# Patient Record
Sex: MALE | Race: WHITE | Employment: FULL TIME | URBAN - METROPOLITAN AREA
[De-identification: names, ages, dates, MRNs, and addresses within clinical notes are randomized per-mention and may not be internally consistent; named-entity substitution may affect disease eponyms.]

---

## 2018-01-24 ENCOUNTER — APPOINTMENT (OUTPATIENT)
Dept: GENERAL RADIOLOGY | Age: 48
End: 2018-01-24
Attending: EMERGENCY MEDICINE
Payer: COMMERCIAL

## 2018-01-24 ENCOUNTER — HOSPITAL ENCOUNTER (EMERGENCY)
Age: 48
Discharge: HOME | End: 2018-01-24
Attending: EMERGENCY MEDICINE
Payer: COMMERCIAL

## 2018-01-24 VITALS
DIASTOLIC BLOOD PRESSURE: 88 MMHG | RESPIRATION RATE: 11 BRPM | SYSTOLIC BLOOD PRESSURE: 146 MMHG | BODY MASS INDEX: 34.54 KG/M2 | HEIGHT: 72 IN | HEART RATE: 66 BPM | WEIGHT: 255 LBS | TEMPERATURE: 97.6 F | OXYGEN SATURATION: 99 %

## 2018-01-24 DIAGNOSIS — I10 ESSENTIAL HYPERTENSION: ICD-10-CM

## 2018-01-24 DIAGNOSIS — R07.89 CHEST WALL PAIN: Primary | ICD-10-CM

## 2018-01-24 LAB
ALBUMIN SERPL-MCNC: 4.3 G/DL (ref 3.4–5)
ALBUMIN/GLOB SERPL: 1.2 {RATIO} (ref 0.8–1.7)
ALP SERPL-CCNC: 66 U/L (ref 45–117)
ALT SERPL-CCNC: 37 U/L (ref 16–61)
ANION GAP SERPL CALC-SCNC: 9 MMOL/L (ref 3–18)
AST SERPL-CCNC: 24 U/L (ref 15–37)
ATRIAL RATE: 86 BPM
BASOPHILS # BLD: 0 K/UL (ref 0–0.06)
BASOPHILS NFR BLD: 0 % (ref 0–2)
BILIRUB SERPL-MCNC: 1 MG/DL (ref 0.2–1)
BUN SERPL-MCNC: 11 MG/DL (ref 7–18)
BUN/CREAT SERPL: 10 (ref 12–20)
CALCIUM SERPL-MCNC: 9.4 MG/DL (ref 8.5–10.1)
CALCULATED P AXIS, ECG09: 54 DEGREES
CALCULATED R AXIS, ECG10: 58 DEGREES
CALCULATED T AXIS, ECG11: 54 DEGREES
CHLORIDE SERPL-SCNC: 97 MMOL/L (ref 100–108)
CO2 SERPL-SCNC: 30 MMOL/L (ref 21–32)
CREAT SERPL-MCNC: 1.11 MG/DL (ref 0.6–1.3)
DIAGNOSIS, 93000: NORMAL
DIFFERENTIAL METHOD BLD: NORMAL
EOSINOPHIL # BLD: 0.2 K/UL (ref 0–0.4)
EOSINOPHIL NFR BLD: 2 % (ref 0–5)
ERYTHROCYTE [DISTWIDTH] IN BLOOD BY AUTOMATED COUNT: 12.2 % (ref 11.6–14.5)
GLOBULIN SER CALC-MCNC: 3.6 G/DL (ref 2–4)
GLUCOSE SERPL-MCNC: 99 MG/DL (ref 74–99)
HCT VFR BLD AUTO: 46.1 % (ref 36–48)
HGB BLD-MCNC: 15.9 G/DL (ref 13–16)
LYMPHOCYTES # BLD: 2.6 K/UL (ref 0.9–3.6)
LYMPHOCYTES NFR BLD: 27 % (ref 21–52)
MCH RBC QN AUTO: 29.9 PG (ref 24–34)
MCHC RBC AUTO-ENTMCNC: 34.5 G/DL (ref 31–37)
MCV RBC AUTO: 86.7 FL (ref 74–97)
MONOCYTES # BLD: 0.6 K/UL (ref 0.05–1.2)
MONOCYTES NFR BLD: 6 % (ref 3–10)
NEUTS SEG # BLD: 6.3 K/UL (ref 1.8–8)
NEUTS SEG NFR BLD: 65 % (ref 40–73)
P-R INTERVAL, ECG05: 164 MS
PLATELET # BLD AUTO: 344 K/UL (ref 135–420)
PMV BLD AUTO: 10 FL (ref 9.2–11.8)
POTASSIUM SERPL-SCNC: 3.7 MMOL/L (ref 3.5–5.5)
PROT SERPL-MCNC: 7.9 G/DL (ref 6.4–8.2)
Q-T INTERVAL, ECG07: 346 MS
QRS DURATION, ECG06: 92 MS
QTC CALCULATION (BEZET), ECG08: 414 MS
RBC # BLD AUTO: 5.32 M/UL (ref 4.7–5.5)
SODIUM SERPL-SCNC: 136 MMOL/L (ref 136–145)
TROPONIN I SERPL-MCNC: <0.02 NG/ML (ref 0–0.06)
VENTRICULAR RATE, ECG03: 86 BPM
WBC # BLD AUTO: 9.7 K/UL (ref 4.6–13.2)

## 2018-01-24 PROCEDURE — 99284 EMERGENCY DEPT VISIT MOD MDM: CPT

## 2018-01-24 PROCEDURE — 85025 COMPLETE CBC W/AUTO DIFF WBC: CPT | Performed by: EMERGENCY MEDICINE

## 2018-01-24 PROCEDURE — 80053 COMPREHEN METABOLIC PANEL: CPT | Performed by: EMERGENCY MEDICINE

## 2018-01-24 PROCEDURE — 71046 X-RAY EXAM CHEST 2 VIEWS: CPT

## 2018-01-24 PROCEDURE — 84484 ASSAY OF TROPONIN QUANT: CPT | Performed by: EMERGENCY MEDICINE

## 2018-01-24 PROCEDURE — 93005 ELECTROCARDIOGRAM TRACING: CPT

## 2018-01-24 NOTE — ED NOTES
Bedside and Verbal shift change report given to Len Parson RN (oncoming nurse) by Deo Puckett RN (offgoing nurse). Report included the following information SBAR, ED Summary, Procedure Summary, Intake/Output and MAR.

## 2018-01-24 NOTE — ED TRIAGE NOTES
C/o left side chest pain since shoveling snow during first snow fall. 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

## 2018-01-24 NOTE — ED PROVIDER NOTES
EMERGENCY DEPARTMENT HISTORY AND PHYSICAL EXAM    2:41 PM      Date: 1/24/2018  Patient Name: Jody Clay    History of Presenting Illness     Chief Complaint   Patient presents with    Chest Pain         History Provided By: Patient    Chief Complaint: Chest Pain  Duration: 3 Weeks  Timing:  Constant and Progressive  Location: Left upper  Quality:   Severity: 4 out of 10  Modifying Factors: shoveling snow  Associated Symptoms: SOB and nausea. Denies emesis or abdominal pain      Additional History (Context): Jody Clay is a 49 y.o. male with hx of ruptured windpipe who presents with c/o constant and progressive 4/10 left upper chest pain onset 3 weeks after shoveling snow. Pt states associated sx of SOB and nausea. Denies emesis or abdominal pain. Pt states that bending over increases pain but ambulating causes no change in sx. Denies hx of heart disease. Pt states he is a current smoker (x30 years) and daily ETOH user (with last drink yesterday). Pt reports not currently having a PCP. PCP: Sierra Grubbs MD        Past History     Past Medical History:  Past Medical History:   Diagnosis Date    H/O medication noncompliance     refused to take meds for bp per patient    Hypertension     Ill-defined condition     ruptured windpipe       Past Surgical History:  History reviewed. No pertinent surgical history. Family History:  History reviewed. No pertinent family history. Social History:  Social History   Substance Use Topics    Smoking status: Current Every Day Smoker    Smokeless tobacco: Never Used    Alcohol use Yes       Allergies:  No Known Allergies      Review of Systems       Review of Systems   Constitutional: Negative for fever. Eyes: Negative for visual disturbance. Respiratory: Positive for shortness of breath. Cardiovascular: Positive for chest pain. Negative for leg swelling. Gastrointestinal: Positive for nausea.  Negative for abdominal pain, blood in stool and vomiting. Genitourinary: Negative for dysuria and flank pain. Musculoskeletal: Negative for arthralgias and neck pain. Skin: Negative for rash. Neurological: Negative for headaches. Hematological: Does not bruise/bleed easily. Psychiatric/Behavioral: Negative for confusion. All other systems reviewed and are negative. Physical Exam     Visit Vitals    BP (!) 195/101    Pulse 79    Temp 97.6 °F (36.4 °C)    Resp 14    Ht 6' (1.829 m)    Wt 115.7 kg (255 lb)    SpO2 100%    BMI 34.58 kg/m2         Physical Exam   Constitutional: He is oriented to person, place, and time. He appears well-developed and well-nourished. No distress. HENT:   Head: Normocephalic and atraumatic. Right Ear: External ear normal.   Left Ear: External ear normal.   Nose: Nose normal.   Mouth/Throat: Oropharynx is clear and moist.   Eyes: Conjunctivae and EOM are normal. Pupils are equal, round, and reactive to light. No scleral icterus. Neck: Normal range of motion. Neck supple. No JVD present. No tracheal deviation present. No thyromegaly present. Cardiovascular: Normal rate, regular rhythm, normal heart sounds and intact distal pulses. Exam reveals no gallop and no friction rub. No murmur heard. Pulmonary/Chest: Effort normal and breath sounds normal. He exhibits no tenderness. Abdominal: Soft. Bowel sounds are normal. He exhibits no distension. There is no tenderness. There is no rebound and no guarding. Musculoskeletal: Normal range of motion. He exhibits tenderness (Reproducible at left upper anterior chest wall). He exhibits no edema. Lymphadenopathy:     He has no cervical adenopathy. Neurological: He is alert and oriented to person, place, and time. No cranial nerve deficit. Coordination normal.   No sensory loss, Gait normal, Motor 5/5   Skin: Skin is warm and dry. Psychiatric: He has a normal mood and affect.  His behavior is normal. Judgment and thought content normal.   Nursing note and vitals reviewed. Diagnostic Study Results     Labs -  Recent Results (from the past 12 hour(s))   EKG, 12 LEAD, INITIAL    Collection Time: 01/24/18  2:37 PM   Result Value Ref Range    Ventricular Rate 86 BPM    Atrial Rate 86 BPM    P-R Interval 164 ms    QRS Duration 92 ms    Q-T Interval 346 ms    QTC Calculation (Bezet) 414 ms    Calculated P Axis 54 degrees    Calculated R Axis 58 degrees    Calculated T Axis 54 degrees    Diagnosis       Normal sinus rhythm  Possible Left atrial enlargement  Borderline ECG  When compared with ECG of 13-APR-2010 13:39,  No significant change was found  Confirmed by Magalie Cerrato MD, Jimy Krishna ([id number]) on 1/24/2018 3:02:30 PM     CBC WITH AUTOMATED DIFF    Collection Time: 01/24/18  2:45 PM   Result Value Ref Range    WBC 9.7 4.6 - 13.2 K/uL    RBC 5.32 4.70 - 5.50 M/uL    HGB 15.9 13.0 - 16.0 g/dL    HCT 46.1 36.0 - 48.0 %    MCV 86.7 74.0 - 97.0 FL    MCH 29.9 24.0 - 34.0 PG    MCHC 34.5 31.0 - 37.0 g/dL    RDW 12.2 11.6 - 14.5 %    PLATELET 822 499 - 063 K/uL    MPV 10.0 9.2 - 11.8 FL    NEUTROPHILS 65 40 - 73 %    LYMPHOCYTES 27 21 - 52 %    MONOCYTES 6 3 - 10 %    EOSINOPHILS 2 0 - 5 %    BASOPHILS 0 0 - 2 %    ABS. NEUTROPHILS 6.3 1.8 - 8.0 K/UL    ABS. LYMPHOCYTES 2.6 0.9 - 3.6 K/UL    ABS. MONOCYTES 0.6 0.05 - 1.2 K/UL    ABS. EOSINOPHILS 0.2 0.0 - 0.4 K/UL    ABS.  BASOPHILS 0.0 0.0 - 0.06 K/UL    DF AUTOMATED     TROPONIN I    Collection Time: 01/24/18  2:45 PM   Result Value Ref Range    Troponin-I, Qt. <0.02 0.00 - 9.87 NG/ML   METABOLIC PANEL, COMPREHENSIVE    Collection Time: 01/24/18  2:45 PM   Result Value Ref Range    Sodium 136 136 - 145 mmol/L    Potassium 3.7 3.5 - 5.5 mmol/L    Chloride 97 (L) 100 - 108 mmol/L    CO2 30 21 - 32 mmol/L    Anion gap 9 3.0 - 18 mmol/L    Glucose 99 74 - 99 mg/dL    BUN 11 7.0 - 18 MG/DL    Creatinine 1.11 0.6 - 1.3 MG/DL    BUN/Creatinine ratio 10 (L) 12 - 20      GFR est AA >60 >60 ml/min/1.73m2    GFR est non-AA >60 >60 ml/min/1.73m2    Calcium 9.4 8.5 - 10.1 MG/DL    Bilirubin, total 1.0 0.2 - 1.0 MG/DL    ALT (SGPT) 37 16 - 61 U/L    AST (SGOT) 24 15 - 37 U/L    Alk. phosphatase 66 45 - 117 U/L    Protein, total 7.9 6.4 - 8.2 g/dL    Albumin 4.3 3.4 - 5.0 g/dL    Globulin 3.6 2.0 - 4.0 g/dL    A-G Ratio 1.2 0.8 - 1.7         Radiologic Studies -   XR CHEST AP LAT    (Results Pending)   CXR 3:25PM ED Provider Interpretation:  No acute changes      Medical Decision Making   I am the first provider for this patient. I reviewed the vital signs, available nursing notes, past medical history, past surgical history, family history and social history. Vital Signs-Reviewed the patient's vital signs. Pulse Oximetry Analysis -  100% on room air (Interpretation) Normal    Cardiac Monitor:  Rate: 87 bpm  Rhythm:  Normal Sinus Rhythm     EKG: Interpreted by the EP. Time Interpreted: 6599   Rate: 86 bpm   Rhythm: Normal Sinus Rhythm    Interpretation: No acute changes      Records Reviewed: Nursing Notes (Time of Review: 2:39 PM)        Provider Notes (Medical Decision Making): Results reviewed with pt, he will F/U with PCP for BP re-check as discussed. Pt says he's feeling better now and wants to go home. He agrees with dispo and F/U plan. Luciana Quintana MD  3:32 PM          Diagnosis     Clinical Impression: No diagnosis found. Disposition: Discharge    Follow-up Information     None           Patient's Medications    No medications on file     _______________________________    Attestations:  Scribe Attestation     Francis Shira acting as a scribe for and in the presence of Luciana Quintana MD      January 24, 2018 at 2:39 PM       Provider Attestation:      I personally performed the services described in the documentation, reviewed the documentation, as recorded by the scribe in my presence, and it accurately and completely records my words and actions.  January 24, 2018 at 2:39 PM - Luciana Quintana MD _______________________________

## 2018-01-24 NOTE — ED NOTES
Bedside and Verbal shift change report given to Ericka Chandra RN (oncoming nurse) by Alex Hwang RN (offgoing nurse). Report included the following information SBAR, ED Summary and Procedure Summary.

## 2018-01-24 NOTE — DISCHARGE INSTRUCTIONS

## 2021-03-16 ENCOUNTER — HOSPITAL ENCOUNTER (OUTPATIENT)
Dept: GENERAL RADIOLOGY | Age: 51
Discharge: HOME | End: 2021-03-16
Payer: COMMERCIAL

## 2021-03-16 DIAGNOSIS — R06.02 SHORTNESS OF BREATH: ICD-10-CM

## 2021-03-16 PROCEDURE — 71046 X-RAY EXAM CHEST 2 VIEWS: CPT

## 2021-04-13 ENCOUNTER — TRANSCRIBE ORDER (OUTPATIENT)
Dept: SCHEDULING | Age: 51
End: 2021-04-13

## 2021-04-13 DIAGNOSIS — G56.01 CARPAL TUNNEL SYNDROME OF RIGHT WRIST: ICD-10-CM

## 2021-04-13 DIAGNOSIS — S23.9XXD SPRAIN OF UNSPECIFIED PARTS OF THORAX, SUBSEQUENT ENCOUNTER: ICD-10-CM

## 2021-04-13 DIAGNOSIS — M54.12 CERVICAL RADICULOPATHY: Primary | ICD-10-CM

## 2021-04-13 DIAGNOSIS — S33.5XXD SPRAIN OF LIGAMENTS OF LUMBAR SPINE, SUBSEQUENT ENCOUNTER: ICD-10-CM

## 2021-04-13 DIAGNOSIS — S43.401D UNSPECIFIED SPRAIN OF RIGHT SHOULDER JOINT, SUBSEQUENT ENCOUNTER: ICD-10-CM

## 2021-05-03 ENCOUNTER — HOSPITAL ENCOUNTER (OUTPATIENT)
Age: 51
Discharge: HOME | End: 2021-05-03
Attending: PHYSICIAN ASSISTANT
Payer: COMMERCIAL

## 2021-05-03 DIAGNOSIS — M54.12 CERVICAL RADICULOPATHY: ICD-10-CM

## 2021-05-03 DIAGNOSIS — S33.5XXD SPRAIN OF LIGAMENTS OF LUMBAR SPINE, SUBSEQUENT ENCOUNTER: ICD-10-CM

## 2021-05-03 DIAGNOSIS — S43.401D UNSPECIFIED SPRAIN OF RIGHT SHOULDER JOINT, SUBSEQUENT ENCOUNTER: ICD-10-CM

## 2021-05-03 DIAGNOSIS — G56.01 CARPAL TUNNEL SYNDROME OF RIGHT WRIST: ICD-10-CM

## 2021-05-03 DIAGNOSIS — S23.9XXD SPRAIN OF UNSPECIFIED PARTS OF THORAX, SUBSEQUENT ENCOUNTER: ICD-10-CM

## 2021-05-03 PROCEDURE — 72141 MRI NECK SPINE W/O DYE: CPT

## 2021-07-15 ENCOUNTER — OFFICE VISIT (OUTPATIENT)
Dept: ONCOLOGY | Age: 51
End: 2021-07-15
Payer: COMMERCIAL

## 2021-07-15 VITALS
RESPIRATION RATE: 18 BRPM | SYSTOLIC BLOOD PRESSURE: 129 MMHG | HEIGHT: 72 IN | WEIGHT: 298.2 LBS | DIASTOLIC BLOOD PRESSURE: 83 MMHG | TEMPERATURE: 98.1 F | HEART RATE: 67 BPM | OXYGEN SATURATION: 95 % | BODY MASS INDEX: 40.39 KG/M2

## 2021-07-15 DIAGNOSIS — C81.14 NODULAR SCLEROSIS HODGKIN LYMPHOMA OF LYMPH NODES OF AXILLA (HCC): ICD-10-CM

## 2021-07-15 DIAGNOSIS — R59.0 CERVICAL ADENOPATHY: ICD-10-CM

## 2021-07-15 DIAGNOSIS — R59.0 CERVICAL ADENOPATHY: Primary | ICD-10-CM

## 2021-07-15 PROCEDURE — 99204 OFFICE O/P NEW MOD 45 MIN: CPT | Performed by: INTERNAL MEDICINE

## 2021-07-15 RX ORDER — GABAPENTIN 300 MG/1
300 CAPSULE ORAL DAILY
COMMUNITY
Start: 2021-06-07

## 2021-07-15 RX ORDER — LOSARTAN POTASSIUM 50 MG/1
50 TABLET ORAL DAILY
COMMUNITY
Start: 2021-04-12

## 2021-07-15 RX ORDER — HYDROCODONE BITARTRATE AND ACETAMINOPHEN 5; 325 MG/1; MG/1
TABLET ORAL
Status: ON HOLD | COMMUNITY
Start: 2021-06-07 | End: 2022-05-27

## 2021-07-15 NOTE — PROGRESS NOTES
Hematology/Oncology Consultation Note      Date: 7/15/2021    Name: Alphonse Murphy  : 1970        Elmer Suarez MD         Subjective:     Chief complaint: Cervical adenopathy    History of Present Illness:   Mr. Aster De Leon is a most pleasant 45y.o. year old male who was seen for consultation of cervical adenopathy. 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 He was status post 6 cycles of ABVD chemotherapy, last treatment was on 2008. Last PET scan was in . His last seen by Oncology was about 3 years ago. -- 5/3/2021 Cervical MRI reported that the cervical adenopathy identified. There appears to be prominence of the left cervical lymph node chain suggesting adenopathy in this 4 and 5 would defer however to patient's planned clinical follow-up for Hodgkin's lymphoma such as PET/CT. The patient otherwise has no other complaints. Denied fever, chills, night sweat, unintentional weight loss, skin lumps or bumps, acute bleeding or bruising issues. Denied headache, acute vision change, dizziness, chest pain, worsen shortness of breath, palpitation, productive cough, nausea, vomiting, abdominal pain, altered bowel habits, dysuria, worsen bone pain or back pain, new focal numbness or weakness. Past Medical History, Family History, and Social History:    Past Medical History:   Diagnosis Date    H/O medication noncompliance     refused to take meds for bp per patient    Hypertension     Ill-defined condition     ruptured windpipe     History reviewed. No pertinent surgical history.   Social History     Socioeconomic History    Marital status:      Spouse name: Not on file    Number of children: Not on file    Years of education: Not on file    Highest education level: Not on file   Occupational History    Not on file   Tobacco Use    Smoking status: Current Every Day Smoker    Smokeless tobacco: Never Used   Substance and Sexual Activity    Alcohol use: Yes    Drug use: Yes     Types: Marijuana     Comment: daily    Sexual activity: Not on file   Other Topics Concern    Not on file   Social History Narrative    Not on file     Social Determinants of Health     Financial Resource Strain:     Difficulty of Paying Living Expenses:    Food Insecurity:     Worried About Running Out of Food in the Last Year:     [address] in the Last Year:    Transportation Needs:     Lack of Transportation (Medical):  Lack of Transportation (Non-Medical):    Physical Activity:     Days of Exercise per Week:     Minutes of Exercise per Session:    Stress:     Feeling of Stress :    Social Connections:     Frequency of Communication with Friends and Family:     Frequency of Social Gatherings with Friends and Family:     Attends Taoist Services:     Active Member of Clubs or Organizations:     Attends Club or Organization Meetings:     Marital Status:    Intimate Partner Violence:     Fear of Current or Ex-Partner:     Emotionally Abused:     Physically Abused:     Sexually Abused:      History reviewed. No pertinent family history. Review of Systems   Constitutional: Negative for chills, diaphoresis, fever, malaise/fatigue and weight loss. Respiratory: Negative for cough, hemoptysis, shortness of breath and wheezing. Cardiovascular: Negative for chest pain, palpitations and leg swelling. Gastrointestinal: Negative for abdominal pain, diarrhea, heartburn, nausea and vomiting. Genitourinary: Negative for dysuria, frequency, hematuria and urgency. Musculoskeletal: Negative for joint pain and myalgias. Skin: Negative for itching and rash. Neurological: Negative for dizziness, seizures, weakness and headaches. Psychiatric/Behavioral: Negative for depression. The patient does not have insomnia.              Objective:     Visit Vitals  /83 (BP 1 Location: Right upper arm, BP Patient Position: Sitting)   Pulse 67   Temp 98.1 °F (36.7 °C) (Temporal)   Resp 18   Ht 6' (1.829 m)   Wt 135.3 kg (298 lb 3.2 oz)   SpO2 95%   BMI 40.44 kg/m²       ECOG Performance Status (grade): 0  0 - able to carry on all pre-disease activity w/out restriction  1 - restricted but able to carry out light work  2 - ambulatory and can self- care but unable to carry out work  3 - bed or chair >50% of waking hours  4 - completely disable, total care, confined to bed or chair    Physical Exam  Constitutional:       General: He is not in acute distress. HENT:      Head: Normocephalic and atraumatic. Eyes:      Pupils: Pupils are equal, round, and reactive to light. Cardiovascular:      Pulses: Normal pulses. Heart sounds: Normal heart sounds. No murmur heard. Pulmonary:      Effort: Pulmonary effort is normal. No respiratory distress. Breath sounds: Normal breath sounds. Abdominal:      General: Bowel sounds are normal. There is no distension. Palpations: Abdomen is soft. There is no mass. Tenderness: There is no abdominal tenderness. There is no guarding. Musculoskeletal:         General: No swelling or tenderness. Cervical back: Neck supple. No rigidity. Lymphadenopathy:      Cervical: No cervical adenopathy. Skin:     General: Skin is warm. Findings: No rash. Neurological:      Mental Status: He is alert and oriented to person, place, and time. Mental status is at baseline. Cranial Nerves: No cranial nerve deficit. Psychiatric:         Mood and Affect: Mood normal.          Diagnostics:      No results found for this or any previous visit (from the past 96 hour(s)). Imaging:  No results found for this or any previous visit. Results for orders placed during the hospital encounter of 03/16/21    XR CHEST PA LAT    Narrative  HISTORY: Shortness of breath. EXAM: Chest.    TECHNIQUE: Two views of the chest were obtained.     COMPARISON: 1/24/2018    FINDINGS: There is no pneumothorax, pneumonia or pleural effusions. Heart and  mediastinal structures are unremarkable. . Visualized bony thorax and soft  tissues are within normal limits. Impression  IMPRESSION:    1. No acute cardiopulmonary process. No change. Results for orders placed during the hospital encounter of 04/06/15    CT CHEST W CONT    Narrative  CT chest with contrast    History: Hodgkin's lymphoma    Compared with 9/10/14    Technique: Axial imaging through the chest was performed after receiving 100 cc  of Isovue-300. Coronal and sagittal reformatted images were generated. Findings: Visualized thyroid gland is without focal abnormality. Subcentimeter axillary lymph nodes are not significantly changed in size or  number. Symmetric mild bilateral gynecomastia. Subcentimeter mediastinal lymph nodes are similar in size and number, no  pathological enlargement. No hilar adenopathy. Heart size is within normal limits. No pericardial effusion. Normal caliber  thoracic aorta. Main pulmonary arteries are patent. No pericardial effusion. No lung nodule or mass. No lung consolidation. No endobronchial lesion. No  effusion. No pneumothorax. No hyperenhancing lesion seen in the visualized liver spleen. No adrenal nodule  or mass. No new upper abdominal lymphadenopathy. Peripancreatic lymph node on  image 59 is similar in size measuring 2.2 x 1.7 cm. Mild degenerative change in the spine, no focal suspicious bone lesion  identified to suggest metastatic disease. Impression:  1. No adenopathy in the chest. Subcentimeter nonspecific axillary and  mediastinal lymph nodes are stable. 2. Gynecomastia. 3. Peripancreatic lymph node is not significantly changed in size. No new upper  abdominal lymphadenopathy. Pathology          Assessment:                                        1. Cervical adenopathy    2.  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 --  Status post 6 cycles of ABVD chemotherapy, last treatment was on 1/2/2008. Last PET scan was in 2014. His last seen by Oncology was about 3 years ago. -- 5/3/2021 Cervical MRI reported that the cervical adenopathy identified. There appears to be prominence of the left cervical lymph node chain suggesting adenopathy in this 4 and 5 would defer however to patient's planned clinical follow-up for Hodgkin's lymphoma such as PET/CT. Plan:  -- PET Scan  -- Labs: CBC, CMP, LDH, ESR/CRP, Flow cytometry  -- Further workup will be guided by results from aforementioned initial study. -- I will see the patient back in clinic in about 2-3 weeks. Always sooner if required. Orders Placed This Encounter    METABOLIC PANEL, COMPREHENSIVE     Standing Status:   Future     Standing Expiration Date:   7/16/2022    SED RATE (ESR)     Standing Status:   Future     Standing Expiration Date:   7/15/2022    CBC WITH AUTOMATED DIFF     Standing Status:   Future     Standing Expiration Date:   7/16/2022    C REACTIVE PROTEIN, QT     Standing Status:   Future     Standing Expiration Date:   7/15/2022    FLOW CYTOMETRY, PERIPHERAL BLD     Standing Status:   Future     Standing Expiration Date:   7/15/2022    LD     Standing Status:   Future     Standing Expiration Date:   7/16/2022    losartan (COZAAR) 50 mg tablet     Sig: TAKE 1 TABLET BY MOUTH EVERY DAY    HYDROcodone-acetaminophen (NORCO) 5-325 mg per tablet     Sig: TAKE 1 TO 2 TABLETS BY MOUTH AT BEDTIME    gabapentin (NEURONTIN) 300 mg capsule     Sig: TAKE 1 CAPSULE BY MOUTH THREE TIMES A DAY           Mr. Ashley Soto has a reminder for a \"due or due soon\" health maintenance. I have asked that he contact his primary care provider for follow-up on this health maintenance.    All of patient's questions answered to their apparent satisfaction. They verbally show understanding and agreement with aforementioned plan. Tiffany Lyons MD  7/15/2021          Parts of this document has been produced using Dragon dictation system. Unrecognized errors in transcription may be present. Please do not hesitate to reach out for any questions or clarifications.

## 2021-07-15 NOTE — PROGRESS NOTES
Loni Parker presents today for   Chief Complaint   Patient presents with    New Patient       Is someone accompanying this pt? no    Is the patient using any DME equipment during OV? no    Coordination of Care:  1. Have you been to the ER, urgent care clinic since your last visit? Hospitalized since your last visit? no    2. Have you seen or consulted any other health care providers outside of the [address] [place] [address] since your last visit? Include any pap smears or colon screening.  no

## 2021-07-22 LAB
ALB/GLOBRATIO, 58C: 1.5 (CALC) (ref 1–2.5)
ALBUMIN SERPL-MCNC: 4.1 G/DL (ref 3.6–5.1)
ALBUMIN SERPL-MCNC: 4.1 G/DL (ref 3.8–4.8)
ALKALINE PHOSPHATASE, TOTAL, 25002000: 57 U/L (ref 35–144)
ALPHA-1-GLOBULIN, 001489: 0.3 G/DL (ref 0.2–0.3)
ALPHA-2-GLOBULIN, 001490: 0.6 G/DL (ref 0.5–0.9)
ALT SERPL-CCNC: 29 U/L (ref 9–46)
AST SERPL W P-5'-P-CCNC: 17 U/L (ref 10–35)
BASOPHILS # BLD: 49 CELLS/UL (ref 0–200)
BASOPHILS NFR BLD: 0.5 %
BETA 1 GLOBULIN: 0.5 G/DL (ref 0.4–0.6)
BETA 2 GLOBULIN: 0.3 G/DL (ref 0.2–0.5)
BILIRUB SERPL-MCNC: 0.4 MG/DL (ref 0.2–1.2)
BUN SERPL-MCNC: 14 MG/DL (ref 7–25)
BUN/CREATININE RATIO,BUCR: ABNORMAL (CALC) (ref 6–22)
CALCIUM SERPL-MCNC: 9.4 MG/DL (ref 8.6–10.3)
CHLORIDE SERPL-SCNC: 100 MMOL/L (ref 98–110)
CLINICAL INFORMATION: NORMAL
CO2 SERPL-SCNC: 26 MMOL/L (ref 20–32)
CREAT SERPL-MCNC: 0.81 MG/DL (ref 0.7–1.33)
CRP SERPL QL: 5.6 MG/L
EOSINOPHIL # BLD: 176 CELLS/UL (ref 15–500)
EOSINOPHIL NFR BLD: 1.8 %
ERYTHROCYTE [DISTWIDTH] IN BLOOD BY AUTOMATED COUNT: 13.1 % (ref 11–15)
ESR, WESTERGREN: 6 MM/H (ref 0–20)
GAMMA GLOBULIN, 001492: 1.1 G/DL (ref 0.8–1.7)
GLOBULIN,GLOB: 2.7 G/DL (CALC) (ref 1.9–3.7)
GLUCOSE SERPL-MCNC: 109 MG/DL (ref 65–99)
HCT VFR BLD AUTO: 44.4 % (ref 38.5–50)
HGB BLD-MCNC: 14.8 G/DL (ref 13.2–17.1)
IGA,SERUM: 264 MG/DL (ref 47–310)
IGG, 823207: 1082 MG/DL (ref 600–1640)
IGM, 823208: 136 MG/DL (ref 50–300)
IMMUNOFIXATION,SERUM: NORMAL
INTERPRETATION: NORMAL
KAPPA FREE LT CHAIN, KAPF: 21.4 MG/L (ref 3.3–19.4)
KAPPA/LAMBDA LIGHT CHAINS FREE WITH RATIO, SERUM: 1.43 (ref 0.26–1.65)
LAMBDA FREE LT CHAIN, LAMF: 15 MG/L (ref 5.7–26.3)
LDH SERPL L TO P-CCNC: 149 U/L (ref 120–250)
LYMPHOCYTES # BLD: 2048 CELLS/UL (ref 850–3900)
LYMPHOCYTES NFR BLD: 20.9 %
Lab: 22
Lab: NORMAL
Lab: NORMAL %
MCH RBC QN AUTO: 29.7 PG (ref 27–33)
MCHC RBC AUTO-ENTMCNC: 33.3 G/DL (ref 32–36)
MCV RBC AUTO: 89.2 FL (ref 80–100)
MONOCYTES # BLD: 598 CELLS/UL (ref 200–950)
MONOCYTES NFR BLD: 6.1 %
NEUTROPHILS # BLD AUTO: 6929 CELLS/UL (ref 1500–7800)
NEUTROPHILS # BLD: 70.7 %
PATHOLOGIST DATE: NORMAL
PATHOLOGIST: NORMAL
PLATELET # BLD AUTO: 364 THOUSAND/UL (ref 140–400)
PMV BLD AUTO: 10.5 FL (ref 7.5–12.5)
POTASSIUM SERPL-SCNC: 4.4 MMOL/L (ref 3.5–5.3)
PROT SERPL-MCNC: 6.8 G/DL (ref 6.1–8.1)
PROT SERPL-MCNC: 6.9 G/DL (ref 6.1–8.1)
RBC # BLD AUTO: 4.98 MILLION/UL (ref 4.2–5.8)
SODIUM SERPL-SCNC: 135 MMOL/L (ref 135–146)
WBC # BLD AUTO: 9.8 THOUSAND/UL (ref 3.8–10.8)

## 2021-07-23 ENCOUNTER — HOSPITAL ENCOUNTER (OUTPATIENT)
Dept: PET IMAGING | Age: 51
Discharge: HOME | End: 2021-07-23
Attending: NURSE PRACTITIONER
Payer: COMMERCIAL

## 2021-07-23 PROCEDURE — A9552 F18 FDG: HCPCS

## 2021-07-23 RX ORDER — FLUDEOXYGLUCOSE F-18 200 MCI/ML
9.92 INJECTION INTRAVENOUS ONCE
Status: COMPLETED | OUTPATIENT
Start: 2021-07-23 | End: 2021-07-23

## 2021-07-23 RX ADMIN — FLUDEOXYGLUCOSE F-18 9.92 MILLICURIE: 200 INJECTION INTRAVENOUS at 10:39

## 2021-08-07 NOTE — PROGRESS NOTES
Hematology/Oncology Note      Date: 2021    Name: Billy Acevedo  : 1970        Shawna Benítez MD         Subjective:     Chief complaint: Cervical adenopathy    History of Present Illness:   Mr. Abdullahi Marie is a most pleasant 45y.o. year old male who was seen for consultation of cervical adenopathy. 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 He was status post 6 cycles of ABVD chemotherapy, last treatment was on 2008. Last PET scan was in . His last seen by Oncology was about 3 years ago. -- 5/3/2021 Cervical MRI reported that the cervical adenopathy identified. There appears to be prominence of the left cervical lymph node chain suggesting adenopathy in this 4 and 5 would defer however to patient's planned clinical follow-up for Hodgkin's lymphoma such as PET/CT. Since last visit he has no other complaints. Denied fever, chills, night sweat, unintentional weight loss, skin lumps or bumps, acute bleeding or bruising issues. Denied headache, acute vision change, dizziness, chest pain, worsen shortness of breath, palpitation, productive cough, nausea, vomiting, abdominal pain, altered bowel habits, dysuria, worsen bone pain or back pain, new focal numbness or weakness. Past Medical History, Family History, and Social History:    Past Medical History:   Diagnosis Date    H/O medication noncompliance     refused to take meds for bp per patient    Hypertension     Ill-defined condition     ruptured windpipe     History reviewed. No pertinent surgical history.   Social History     Socioeconomic History    Marital status:      Spouse name: Not on file    Number of children: Not on file    Years of education: Not on file    Highest education level: Not on file   Occupational History    Not on file   Tobacco Use    Smoking status: Current Every Day Smoker    Smokeless tobacco: Never Used   Substance and Sexual Activity    Alcohol use: Yes    Drug use: Yes     Types: Marijuana     Comment: daily    Sexual activity: Not on file   Other Topics Concern    Not on file   Social History Narrative    Not on file     Social Determinants of Health     Financial Resource Strain:     Difficulty of Paying Living Expenses:    Food Insecurity:     Worried About Running Out of Food in the Last Year:     [address] in the Last Year:    Transportation Needs:     Lack of Transportation (Medical):  Lack of Transportation (Non-Medical):    Physical Activity:     Days of Exercise per Week:     Minutes of Exercise per Session:    Stress:     Feeling of Stress :    Social Connections:     Frequency of Communication with Friends and Family:     Frequency of Social Gatherings with Friends and Family:     Attends [employer] Services:     Active Member of Clubs or Organizations:     Attends Club or Organization Meetings:     Marital Status:    Intimate Partner Violence:     Fear of Current or Ex-Partner:     Emotionally Abused:     Physically Abused:     Sexually Abused:      History reviewed. No pertinent family history. Review of Systems   Constitutional: Negative for chills, diaphoresis, fever, malaise/fatigue and weight loss. Respiratory: Negative for cough, hemoptysis, shortness of breath and wheezing. Cardiovascular: Negative for chest pain, palpitations and leg swelling. Gastrointestinal: Negative for abdominal pain, diarrhea, heartburn, nausea and vomiting. Genitourinary: Negative for dysuria, frequency, hematuria and urgency. Musculoskeletal: Negative for joint pain and myalgias. Skin: Negative for itching and rash. Neurological: Negative for dizziness, seizures, weakness and headaches. Psychiatric/Behavioral: Negative for depression. The patient does not have insomnia.              Objective:     Visit Vitals  BP (!) 150/108 (BP 1 Location: Left upper arm, BP Patient Position: Sitting)   Pulse 78   Temp 98.6 °F (37 °C) (Temporal)   Resp 18   Ht 6' (1.829 m)   Wt 135.9 kg (299 lb 9.6 oz)   SpO2 97%   BMI 40.63 kg/m²       ECOG Performance Status (grade): 0  0 - able to carry on all pre-disease activity w/out restriction  1 - restricted but able to carry out light work  2 - ambulatory and can self- care but unable to carry out work  3 - bed or chair >50% of waking hours  4 - completely disable, total care, confined to bed or chair    Physical Exam  Constitutional:       General: He is not in acute distress. HENT:      Head: Normocephalic and atraumatic. Eyes:      Pupils: Pupils are equal, round, and reactive to light. Cardiovascular:      Pulses: Normal pulses. Heart sounds: Normal heart sounds. No murmur heard. Pulmonary:      Effort: Pulmonary effort is normal. No respiratory distress. Breath sounds: Normal breath sounds. Abdominal:      General: Bowel sounds are normal. There is no distension. Palpations: Abdomen is soft. There is no mass. Tenderness: There is no abdominal tenderness. There is no guarding. Musculoskeletal:         General: No swelling or tenderness. Cervical back: Neck supple. No rigidity. Lymphadenopathy:      Cervical: No cervical adenopathy. Skin:     General: Skin is warm. Findings: No rash. Neurological:      Mental Status: He is alert and oriented to person, place, and time. Mental status is at baseline. Cranial Nerves: No cranial nerve deficit. Psychiatric:         Mood and Affect: Mood normal.          Diagnostics:      No results found for this or any previous visit (from the past 96 hour(s)). Imaging:  No results found for this or any previous visit. Results for orders placed during the hospital encounter of 03/16/21    XR CHEST PA LAT    Narrative  HISTORY: Shortness of breath. EXAM: Chest.    TECHNIQUE: Two views of the chest were obtained.     COMPARISON: 1/24/2018    FINDINGS: There is no pneumothorax, pneumonia or pleural effusions. Heart and  mediastinal structures are unremarkable. . Visualized bony thorax and soft  tissues are within normal limits. Impression  IMPRESSION:    1. No acute cardiopulmonary process. No change. Results for orders placed during the hospital encounter of 04/06/15    CT CHEST W CONT    Narrative  CT chest with contrast    History: Hodgkin's lymphoma    Compared with 9/10/14    Technique: Axial imaging through the chest was performed after receiving 100 cc  of Isovue-300. Coronal and sagittal reformatted images were generated. Findings: Visualized thyroid gland is without focal abnormality. Subcentimeter axillary lymph nodes are not significantly changed in size or  number. Symmetric mild bilateral gynecomastia. Subcentimeter mediastinal lymph nodes are similar in size and number, no  pathological enlargement. No hilar adenopathy. Heart size is within normal limits. No pericardial effusion. Normal caliber  thoracic aorta. Main pulmonary arteries are patent. No pericardial effusion. No lung nodule or mass. No lung consolidation. No endobronchial lesion. No  effusion. No pneumothorax. No hyperenhancing lesion seen in the visualized liver spleen. No adrenal nodule  or mass. No new upper abdominal lymphadenopathy. Peripancreatic lymph node on  image 59 is similar in size measuring 2.2 x 1.7 cm. Mild degenerative change in the spine, no focal suspicious bone lesion  identified to suggest metastatic disease. Impression:  1. No adenopathy in the chest. Subcentimeter nonspecific axillary and  mediastinal lymph nodes are stable. 2. Gynecomastia. 3. Peripancreatic lymph node is not significantly changed in size. No new upper  abdominal lymphadenopathy.         Pathology          Assessment:                                        1. 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 --  Status post 6 cycles of ABVD chemotherapy, last treatment was on 1/2/2008. Last PET scan was in 2014. His last seen by Oncology was about 3 years ago. -- 5/3/2021 Cervical MRI reported that the cervical adenopathy identified. There appears to be prominence of the left cervical lymph node chain suggesting adenopathy in this 4 and 5 would defer however to patient's planned clinical follow-up for Hodgkin's lymphoma such as PET/CT. -- Today I have reviewed with the patient about recent PET reports. + 7/23/2021 PET scan reported no finding for suspicious lymph nodes for recurrent lymphoma in the neck or chest. Mildly enlarged right common iliac chain lymph nodes with moderate metabolic activity Deauville 3. These lymph nodes are noted to be new from more remote PET of 2013. Continue close imaging surveillance recommended. -- 7/15/2021 Flow cytometry: No evidence of non-Hodgkin lymphoma or acute leukemia   Unremarkable LDH and CRP. -- Clinical stable, asymptomatic oncologically. Plan:  -- We will repeat CT AP in 3 months for close monitoring   -- Patient will seek a prompt medical advice if fever, chills, weight loss, night sweat, bleeding, swelling, or any concerns. -- Labs: CBC, CMP, ESR prior to next visit  -- I will see the patient back in clinic in about 3 months. Always sooner if required. Orders Placed This Encounter    CT ABD PELV W CONT     Standing Status:   Future     Number of Occurrences:   1     Standing Expiration Date:   9/9/2022     Scheduling Instructions:      Please schedule parent for three months     Order Specific Question:   STAT Creatinine as indicated     Answer:   Yes     Order Specific Question: This order utilizes IV contrast.  What additional contrast is needed?      Answer:   Per Radiologist Protocol           Mr. Azucena Santos has a reminder for a \"due or due soon\" health maintenance. I have asked that he contact his primary care provider for follow-up on this health maintenance. All of patient's questions answered to their apparent satisfaction. They verbally show understanding and agreement with aforementioned plan. Maycol Vicente MD  8/9/2021          Parts of this document has been produced using Dragon dictation system. Unrecognized errors in transcription may be present. Please do not hesitate to reach out for any questions or clarifications.

## 2021-08-09 ENCOUNTER — OFFICE VISIT (OUTPATIENT)
Dept: ONCOLOGY | Age: 51
End: 2021-08-09
Payer: COMMERCIAL

## 2021-08-09 VITALS
TEMPERATURE: 98.6 F | HEIGHT: 72 IN | RESPIRATION RATE: 18 BRPM | OXYGEN SATURATION: 97 % | WEIGHT: 299.6 LBS | DIASTOLIC BLOOD PRESSURE: 108 MMHG | HEART RATE: 78 BPM | SYSTOLIC BLOOD PRESSURE: 150 MMHG | BODY MASS INDEX: 40.58 KG/M2

## 2021-08-09 DIAGNOSIS — R59.0 CERVICAL ADENOPATHY: ICD-10-CM

## 2021-08-09 DIAGNOSIS — C81.14 NODULAR SCLEROSIS HODGKIN LYMPHOMA OF LYMPH NODES OF AXILLA (HCC): ICD-10-CM

## 2021-08-09 DIAGNOSIS — C81.14 NODULAR SCLEROSIS HODGKIN LYMPHOMA OF LYMPH NODES OF AXILLA (HCC): Primary | ICD-10-CM

## 2021-08-09 PROCEDURE — 99214 OFFICE O/P EST MOD 30 MIN: CPT | Performed by: INTERNAL MEDICINE

## 2021-08-09 NOTE — PROGRESS NOTES
Karen Diaz presents today for   Chief Complaint   Patient presents with    Follow-up       Is someone accompanying this pt? no    Is the patient using any DME equipment during OV? no    Coordination of Care:  1. Have you been to the ER, urgent care clinic since your last visit? Hospitalized since your last visit? no    2. Have you seen or consulted any other health care providers outside of the [address] [place] [address] since your last visit? Include any pap smears or colon screening.  no

## 2021-10-28 ENCOUNTER — LAB ONLY (OUTPATIENT)
Dept: ONCOLOGY | Age: 51
End: 2021-10-28

## 2021-10-28 ENCOUNTER — HOSPITAL ENCOUNTER (OUTPATIENT)
Dept: LAB | Age: 51
Discharge: HOME | End: 2021-10-28
Payer: COMMERCIAL

## 2021-10-28 DIAGNOSIS — C81.14 NODULAR SCLEROSIS HODGKIN LYMPHOMA OF LYMPH NODES OF AXILLA (HCC): Primary | ICD-10-CM

## 2021-10-28 DIAGNOSIS — R59.0 CERVICAL ADENOPATHY: ICD-10-CM

## 2021-10-28 LAB
ALBUMIN SERPL-MCNC: 3.9 G/DL (ref 3.4–5)
ALBUMIN/GLOB SERPL: 1.1 {RATIO} (ref 0.8–1.7)
ALP SERPL-CCNC: 64 U/L (ref 45–117)
ALT SERPL-CCNC: 59 U/L (ref 16–61)
ANION GAP SERPL CALC-SCNC: 5 MMOL/L (ref 3–18)
AST SERPL-CCNC: 24 U/L (ref 10–38)
BASOPHILS # BLD: 0.1 K/UL (ref 0–0.1)
BASOPHILS NFR BLD: 1 % (ref 0–2)
BILIRUB SERPL-MCNC: 0.5 MG/DL (ref 0.2–1)
BUN SERPL-MCNC: 11 MG/DL (ref 7–18)
BUN/CREAT SERPL: 12 (ref 12–20)
CALCIUM SERPL-MCNC: 9.8 MG/DL (ref 8.5–10.1)
CHLORIDE SERPL-SCNC: 106 MMOL/L (ref 100–111)
CO2 SERPL-SCNC: 28 MMOL/L (ref 21–32)
CREAT SERPL-MCNC: 0.93 MG/DL (ref 0.6–1.3)
DIFFERENTIAL METHOD BLD: NORMAL
EOSINOPHIL # BLD: 0.3 K/UL (ref 0–0.4)
EOSINOPHIL NFR BLD: 3 % (ref 0–5)
ERYTHROCYTE [DISTWIDTH] IN BLOOD BY AUTOMATED COUNT: 12.7 % (ref 11.6–14.5)
ERYTHROCYTE [SEDIMENTATION RATE] IN BLOOD: 11 MM/HR (ref 0–20)
GLOBULIN SER CALC-MCNC: 3.5 G/DL (ref 2–4)
GLUCOSE SERPL-MCNC: 113 MG/DL (ref 74–99)
HCT VFR BLD AUTO: 44.4 % (ref 36–48)
HGB BLD-MCNC: 14.4 G/DL (ref 13–16)
LYMPHOCYTES # BLD: 2.3 K/UL (ref 0.9–3.6)
LYMPHOCYTES NFR BLD: 23 % (ref 21–52)
MCH RBC QN AUTO: 28.7 PG (ref 24–34)
MCHC RBC AUTO-ENTMCNC: 32.4 G/DL (ref 31–37)
MCV RBC AUTO: 88.4 FL (ref 78–100)
MONOCYTES # BLD: 0.6 K/UL (ref 0.05–1.2)
MONOCYTES NFR BLD: 6 % (ref 3–10)
NEUTS SEG # BLD: 6.6 K/UL (ref 1.8–8)
NEUTS SEG NFR BLD: 67 % (ref 40–73)
PLATELET # BLD AUTO: 363 K/UL (ref 135–420)
PMV BLD AUTO: 9.9 FL (ref 9.2–11.8)
POTASSIUM SERPL-SCNC: 4.9 MMOL/L (ref 3.5–5.5)
PROT SERPL-MCNC: 7.4 G/DL (ref 6.4–8.2)
RBC # BLD AUTO: 5.02 M/UL (ref 4.35–5.65)
SODIUM SERPL-SCNC: 139 MMOL/L (ref 136–145)
WBC # BLD AUTO: 10 K/UL (ref 4.6–13.2)

## 2021-10-28 PROCEDURE — 80053 COMPREHEN METABOLIC PANEL: CPT

## 2021-10-28 PROCEDURE — 85652 RBC SED RATE AUTOMATED: CPT

## 2021-10-28 PROCEDURE — 85025 COMPLETE CBC W/AUTO DIFF WBC: CPT

## 2021-10-28 PROCEDURE — 36415 COLL VENOUS BLD VENIPUNCTURE: CPT

## 2021-11-02 ENCOUNTER — HOSPITAL ENCOUNTER (OUTPATIENT)
Dept: CT IMAGING | Age: 51
Discharge: HOME | End: 2021-11-02
Attending: INTERNAL MEDICINE
Payer: COMMERCIAL

## 2021-11-02 LAB — CREAT UR-MCNC: 0.8 MG/DL (ref 0.6–1.3)

## 2021-11-02 PROCEDURE — 82565 ASSAY OF CREATININE: CPT

## 2021-11-02 PROCEDURE — 74177 CT ABD & PELVIS W/CONTRAST: CPT

## 2021-11-02 PROCEDURE — 74011000636 HC RX REV CODE- 636: Performed by: INTERNAL MEDICINE

## 2021-11-02 RX ADMIN — IOPAMIDOL 100 ML: 612 INJECTION, SOLUTION INTRAVENOUS at 08:41

## 2021-11-11 ENCOUNTER — OFFICE VISIT (OUTPATIENT)
Dept: ONCOLOGY | Age: 51
End: 2021-11-11
Payer: COMMERCIAL

## 2021-11-11 VITALS
OXYGEN SATURATION: 96 % | HEART RATE: 70 BPM | RESPIRATION RATE: 18 BRPM | SYSTOLIC BLOOD PRESSURE: 129 MMHG | BODY MASS INDEX: 41.17 KG/M2 | TEMPERATURE: 98.2 F | HEIGHT: 72 IN | DIASTOLIC BLOOD PRESSURE: 80 MMHG | WEIGHT: 304 LBS

## 2021-11-11 DIAGNOSIS — C81.14 NODULAR SCLEROSIS HODGKIN LYMPHOMA OF LYMPH NODES OF AXILLA (HCC): Primary | ICD-10-CM

## 2021-11-11 DIAGNOSIS — R59.0 CERVICAL ADENOPATHY: ICD-10-CM

## 2021-11-11 PROCEDURE — 99214 OFFICE O/P EST MOD 30 MIN: CPT | Performed by: INTERNAL MEDICINE

## 2021-11-11 NOTE — PROGRESS NOTES
Hematology/Oncology Note      Date: 2021    Name: Hilary Wilkerson  : 1970        Lawrence Davis MD         Subjective:     Chief complaint: Cervical adenopathy    History of Present Illness:   Mr. Annamaria Rodriguez is 45y.o. year old male who was seen for management of cervical adenopathy. 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 He was status post 6 cycles of ABVD chemotherapy, last treatment was on 2008. Last PET scan was in . He reported he was last seen by Oncology  about 3 years ago and has now change to a new practice . -- 5/3/2021 Cervical MRI reported that the cervical adenopathy identified. There appears to be prominence of the left cervical lymph node chain suggesting adenopathy in this 4 and 5     Today the patient is in the office for follow up of care. He denied fever, chills, night sweat, unintentional weight loss, skin lumps or bumps, acute bleeding or bruising issues. Denied headache, acute vision change, dizziness, chest pain, worsen shortness of breath, palpitation, productive cough, nausea, vomiting, abdominal pain, altered bowel habits, dysuria, worsen bone pain or back pain, new focal numbness or weakness. Past Medical History, Family History, and Social History:    Past Medical History:   Diagnosis Date    H/O medication noncompliance     refused to take meds for bp per patient    Hypertension     Ill-defined condition     ruptured windpipe     No past surgical history on file. Social History     Socioeconomic History    Marital status:      Spouse name: Not on file    Number of children: Not on file    Years of education: Not on file    Highest education level: Not on file   Occupational History    Not on file   Tobacco Use    Smoking status: Current Every Day Smoker    Smokeless tobacco: Never Used   Substance and Sexual Activity    Alcohol use: Yes    Drug use:  Yes Types: Marijuana     Comment: daily    Sexual activity: Not on file   Other Topics Concern    Not on file   Social History Narrative    Not on file     Social Determinants of Health     Financial Resource Strain:     Difficulty of Paying Living Expenses: Not on file   Food Insecurity:     Worried About Running Out of Food in the Last Year: Not on file    Iza of Food in the Last Year: Not on file   Transportation Needs:     Lack of Transportation (Medical): Not on file    Lack of Transportation (Non-Medical): Not on file   Physical Activity:     Days of Exercise per Week: Not on file    Minutes of Exercise per Session: Not on file   Stress:     Feeling of Stress : Not on file   Social Connections:     Frequency of Communication with Friends and Family: Not on file    Frequency of Social Gatherings with Friends and Family: Not on file    Attends Yarsanism Services: Not on file    Active Member of [address] [place] [address] [employer] or Organizations: Not on file    Attends Club or Organization Meetings: Not on file    Marital Status: Not on file   Intimate Partner Violence:     Fear of Current or Ex-Partner: Not on file    Emotionally Abused: Not on file    Physically Abused: Not on file    Sexually Abused: Not on file   Housing Stability:     Unable to Pay for Housing in the Last Year: Not on file    Number of [place] in the Last Year: Not on file    Unstable Housing in the Last Year: Not on file     No family history on file. Review of Systems   Constitutional: Negative for chills, diaphoresis, fever, malaise/fatigue and weight loss. Respiratory: Negative for cough, hemoptysis, shortness of breath and wheezing. Cardiovascular: Negative for chest pain, palpitations and leg swelling. Gastrointestinal: Negative for abdominal pain, diarrhea, heartburn, nausea and vomiting. Genitourinary: Negative for dysuria, frequency, hematuria and urgency. Musculoskeletal: Negative for joint pain and myalgias. Skin: Negative for itching and rash. Neurological: Negative for dizziness, seizures, weakness and headaches. Psychiatric/Behavioral: Negative for depression. The patient does not have insomnia. Objective:     Visit Vitals  /80   Pulse 70   Temp 98.2 °F (36.8 °C) (Temporal)   Resp 18   Ht 6' (1.829 m)   Wt 137.9 kg (304 lb)   SpO2 96%   BMI 41.23 kg/m²       ECOG Performance Status (grade): 0  0 - able to carry on all pre-disease activity w/out restriction  1 - restricted but able to carry out light work  2 - ambulatory and can self- care but unable to carry out work  3 - bed or chair >50% of waking hours  4 - completely disable, total care, confined to bed or chair    Physical Exam  Constitutional:       General: He is not in acute distress. Appearance: Normal appearance. Eyes:      Pupils: Pupils are equal, round, and reactive to light. Cardiovascular:      Rate and Rhythm: Normal rate. Pulses: Normal pulses. Heart sounds: Normal heart sounds. No murmur heard. Pulmonary:      Effort: Pulmonary effort is normal. No respiratory distress. Breath sounds: Normal breath sounds. Abdominal:      General: There is no distension. Palpations: Abdomen is soft. There is no mass. Tenderness: There is no abdominal tenderness. There is no guarding. Musculoskeletal:         General: No swelling or tenderness. Cervical back: Neck supple. No rigidity. Lymphadenopathy:      Cervical: No cervical adenopathy. Skin:     Findings: No rash. Neurological:      Mental Status: He is alert and oriented to person, place, and time. Mental status is at baseline. Cranial Nerves: No cranial nerve deficit. Psychiatric:         Mood and Affect: Mood normal.          Diagnostics:      No results found for this or any previous visit (from the past 96 hour(s)). Imaging:  No results found for this or any previous visit.       Results for orders placed during the hospital encounter of 03/16/21    XR CHEST PA LAT    Narrative  HISTORY: Shortness of breath. EXAM: Chest.    TECHNIQUE: Two views of the chest were obtained. COMPARISON: 1/24/2018    FINDINGS: There is no pneumothorax, pneumonia or pleural effusions. Heart and  mediastinal structures are unremarkable. . Visualized bony thorax and soft  tissues are within normal limits. Impression  IMPRESSION:    1. No acute cardiopulmonary process. No change. Results for orders placed in visit on 08/09/21    CT ABD PELV W CONT    Narrative  EXAMINATION: CT abdomen/pelvis with IV contrast    INDICATION: Non-Hodgkin's lymphoma, mildly enlarged right iliac nodes on PET/CT. COMPARISON: PET/CT 7/23/2021    TECHNIQUE: CT of the abdomen pelvis performed following 100 cc IV Isovue-300  with multiplanar reformations. All CT scans at this facility are performed using  dose optimization technique as appropriate to a performed exam, to include  automated exposure control, adjustment of the mA and/or kV according to patient  size (including appropriate matching first site specific examinations), or use  of iterative reconstruction technique. FINDINGS:    Lower chest: Unremarkable. Hepatobiliary: Liver unremarkable. Color unremarkable. No biliary duct  dilatation. Pancreas: Unremarkable. Spleen: Unremarkable. Adrenals: Unremarkable. Genitourinary:  -Right kidney: Unremarkable.  -Left kidney: Unremarkable.  -Bladder/reproductive: Bladder unremarkable. Prostate unremarkable. Gastrointestinal: Stomach unremarkable. Small bowel loops nondilated. The colon  is nondilated. A few sigmoid diverticula. Appendix normal.    Mesentery/vessels/nodes: No free air. No free fluid. Major vessels unremarkable. A few subcentimeter right common iliac chain nodes without any definite change  since recent PET/CT. Right external iliac node measures 2.5 x 1.4 cm, same as  recent PET scan.  Left external iliac node measures 1.8 x 1.1 cm, also same as  prior PET scan. Portacaval node measures 3.4 x 1.8 cm, previously 3.3 x 1.8 cm. Miscellaneous: Small fat-containing inguinal hernias bilaterally. Bones: L5-S1 lumbar hardware fixation without obvious complication. No acute  osseous findings. Bilateral hip degenerative changes. Impression  FDG avid subcentimeter right common iliac nodes without significant change in  size since recent PET/CT. -Mildly enlarged bilateral external iliac chain nodes and enlarged portacaval  node also similar to recent PET/CT. -Recommend attention on follow-up imaging. No acute findings. See additional details above. Assessment:                                        1. 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 --  Status post 6 cycles of ABVD chemotherapy, last treatment was on 1/2/2008. Last PET scan was in 2014. His last seen by Oncology was about 3 years ago. -- 5/3/2021 Cervical MRI reported that the cervical adenopathy identified. There appears to be prominence of the left cervical lymph node chain suggesting adenopathy in this 4 and 5 would defer however to patient's planned clinical follow-up for Hodgkin's lymphoma such as PET/CT. --  7/23/2021 PET scan reported no finding for suspicious lymph nodes for recurrent lymphoma in the neck or chest. Mildly enlarged right common iliac chain lymph nodes with moderate metabolic activity Deauville 3. These lymph nodes are noted to be new from more remote PET of 2013. Continue close imaging surveillance recommended. -- 7/15/2021 Flow cytometry: No evidence of non-Hodgkin lymphoma or acute leukemia   Unremarkable LDH and CRP.   --- follow up CT ordered on 11/2/2021 reported   FDG avid subcentimeter right common iliac nodes without significant change in  size since recent PET/CT. -Mildly enlarged bilateral external iliac chain nodes and enlarged portacaval  node also similar to recent PET/CT. -Recommend attention on follow-up imaging. --- Labs: CBC, CMP, LDH prior to next visit  --- labs on 10/28/2021 reviewed with patient today       Plan:  -- We will repeat CT AP in 6 months for close monitoring   -- Patient was advise to seek a prompt medical advice if fever, chills, weight loss, night sweat, bleeding, swelling, or any concerns. -- I will see the patient back in clinic in about 6 months to review result . Always sooner if required. Orders Placed This Encounter    CT CHEST ABD PELV W CONT     Standing Status:   Future     Standing Expiration Date:   12/11/2022     Scheduling Instructions: In six months     Order Specific Question:   STAT Creatinine as indicated     Answer:   Yes     Order Specific Question: This order utilizes IV contrast.  What additional contrast is needed? Answer:   Per Radiologist Protocol           Mr. Rhys Tena has a reminder for a \"due or due soon\" health maintenance. I have asked that he contact his primary care provider for follow-up on this health maintenance. All of patient's questions answered to their apparent satisfaction. They verbally show understanding and agreement with aforementioned plan. Yann Delgado MD  11/11/2021          Parts of this document has been produced using Dragon dictation system. Unrecognized errors in transcription may be present. Please do not hesitate to reach out for any questions or clarifications.

## 2021-11-11 NOTE — PATIENT INSTRUCTIONS
Hodgkin Lymphoma: Care Instructions  Overview     Hodgkin lymphoma is a type of cancer that affects part of the immune system (lymph system). Cells normally found in the lymph nodes and spleen can increase in number and collect in areas where they cause problems. Hodgkin lymphoma is not contagious and is not caused by an injury. Treatment for Hodgkin lymphoma depends on the stage of the lymphoma and what type of lymphoma you have. It is usually treated with medicines called chemotherapy. You may also need radiation treatments or a procedure called a stem cell transplant. Or you may have targeted therapy or immunotherapy. Your doctor will talk to you about what kind of treatment may be best for you. Follow-up care is a key part of your treatment and safety. Be sure to make and go to all appointments, and call your doctor if you are having problems. It's also a good idea to know your test results and keep a list of the medicines you take. How can you care for yourself at home? · Take your medicines exactly as prescribed. Call your doctor if you think you are having a problem with your medicine. · Eat healthy food. If you do not feel like eating, try to eat food that has protein and extra calories to keep up your strength and prevent weight loss. · Get some physical activity every day, but do not get too tired. · Get enough sleep and take time to do things you enjoy. This can help reduce stress. · Think about joining a support group. Or discuss your concerns with your doctor, counselor, or other health professional.  · If you are vomiting or have diarrhea:  ? Drink plenty of fluids to prevent dehydration. Choose water and other clear liquids. If you have kidney, heart, or liver disease and have to limit fluids, talk with your doctor before you increase the amount of fluids you drink. ? When you are able to eat, try clear soups, mild foods, and liquids until all symptoms are gone for 12 to 48 hours.  Other good choices include dry toast, crackers, cooked cereal, and gelatin dessert, such as Jell-O.  · If you have not already done so, prepare a list of advance directives. Advance directives are instructions to your doctor and family members about what kind of care you want if you become unable to speak or express yourself. When should you call for help? Call 911 anytime you think you may need emergency care. For example, call if:    · You passed out (lost consciousness). Call your doctor now or seek immediate medical care if:    · You have a fever.     · You have abnormal bleeding.     · You have new or worse pain.     · You think you have an infection.     · You have new symptoms, such as a cough, belly pain, vomiting, diarrhea, or a rash. Watch closely for changes in your health, and be sure to contact your doctor if:    · You are much more tired than usual.     · You have swollen glands in your armpits, groin, or neck.     · You do not get better as expected. Where can you learn more? Go to http://www.gray.com/  Enter B619 in the search box to learn more about \"Hodgkin Lymphoma: Care Instructions. \"  Current as of: December 17, 2020               Content Version: 13.0  © 2006-2021 [employer]. Care instructions adapted under license by [employer] (which disclaims liability or warranty for this information). If you have questions about a medical condition or this instruction, always ask your healthcare professional. [place] [address] any warranty or liability for your use of this information.

## 2022-04-29 ENCOUNTER — HOSPITAL ENCOUNTER (OUTPATIENT)
Dept: CT IMAGING | Age: 52
Discharge: HOME | End: 2022-04-29
Attending: NURSE PRACTITIONER
Payer: COMMERCIAL

## 2022-04-29 ENCOUNTER — HOSPITAL ENCOUNTER (OUTPATIENT)
Dept: LAB | Age: 52
Discharge: HOME | End: 2022-04-29

## 2022-04-29 DIAGNOSIS — C81.14 NODULAR SCLEROSIS HODGKIN LYMPHOMA OF LYMPH NODES OF AXILLA (HCC): ICD-10-CM

## 2022-04-29 LAB
CREAT UR-MCNC: 0.8 MG/DL (ref 0.6–1.3)
XX-LABCORP SPECIMEN COL,LCBCF: NORMAL

## 2022-04-29 PROCEDURE — 82565 ASSAY OF CREATININE: CPT

## 2022-04-29 PROCEDURE — 74011000636 HC RX REV CODE- 636: Performed by: NURSE PRACTITIONER

## 2022-04-29 PROCEDURE — 74177 CT ABD & PELVIS W/CONTRAST: CPT

## 2022-04-29 PROCEDURE — 99001 SPECIMEN HANDLING PT-LAB: CPT

## 2022-04-29 RX ADMIN — IOPAMIDOL 100 ML: 612 INJECTION, SOLUTION INTRAVENOUS at 10:38

## 2022-04-30 LAB
ALBUMIN SERPL-MCNC: 4.4 G/DL (ref 3.8–4.9)
ALBUMIN/GLOB SERPL: 1.5 {RATIO} (ref 1.2–2.2)
ALP SERPL-CCNC: 70 IU/L (ref 44–121)
ALT SERPL-CCNC: 23 IU/L (ref 0–44)
AST SERPL-CCNC: 22 IU/L (ref 0–40)
BASOPHILS # BLD AUTO: 0.1 X10E3/UL (ref 0–0.2)
BASOPHILS NFR BLD AUTO: 1 %
BILIRUB SERPL-MCNC: 0.4 MG/DL (ref 0–1.2)
BUN SERPL-MCNC: 9 MG/DL (ref 6–24)
BUN/CREAT SERPL: 10 (ref 9–20)
CALCIUM SERPL-MCNC: 9.6 MG/DL (ref 8.7–10.2)
CHLORIDE SERPL-SCNC: 101 MMOL/L (ref 96–106)
CO2 SERPL-SCNC: 23 MMOL/L (ref 20–29)
CREAT SERPL-MCNC: 0.92 MG/DL (ref 0.76–1.27)
EGFR: 100 ML/MIN/1.73
EOSINOPHIL # BLD AUTO: 0.1 X10E3/UL (ref 0–0.4)
EOSINOPHIL NFR BLD AUTO: 1 %
ERYTHROCYTE [DISTWIDTH] IN BLOOD BY AUTOMATED COUNT: 12.7 % (ref 11.6–15.4)
ERYTHROCYTE [SEDIMENTATION RATE] IN BLOOD BY WESTERGREN METHOD: 14 MM/HR (ref 0–30)
GLOBULIN SER CALC-MCNC: 2.9 G/DL (ref 1.5–4.5)
GLUCOSE SERPL-MCNC: 106 MG/DL (ref 65–99)
HCT VFR BLD AUTO: 43.2 % (ref 37.5–51)
HGB BLD-MCNC: 14.3 G/DL (ref 13–17.7)
IMM GRANULOCYTES # BLD AUTO: 0 X10E3/UL (ref 0–0.1)
IMM GRANULOCYTES NFR BLD AUTO: 0 %
LYMPHOCYTES # BLD AUTO: 2 X10E3/UL (ref 0.7–3.1)
LYMPHOCYTES NFR BLD AUTO: 20 %
MCH RBC QN AUTO: 28.6 PG (ref 26.6–33)
MCHC RBC AUTO-ENTMCNC: 33.1 G/DL (ref 31.5–35.7)
MCV RBC AUTO: 86 FL (ref 79–97)
MONOCYTES # BLD AUTO: 0.4 X10E3/UL (ref 0.1–0.9)
MONOCYTES NFR BLD AUTO: 4 %
NEUTROPHILS # BLD AUTO: 7.5 X10E3/UL (ref 1.4–7)
NEUTROPHILS NFR BLD AUTO: 74 %
PLATELET # BLD AUTO: 395 X10E3/UL (ref 150–450)
POTASSIUM SERPL-SCNC: 5.2 MMOL/L (ref 3.5–5.2)
PROT SERPL-MCNC: 7.3 G/DL (ref 6–8.5)
RBC # BLD AUTO: 5 X10E6/UL (ref 4.14–5.8)
SODIUM SERPL-SCNC: 141 MMOL/L (ref 134–144)
WBC # BLD AUTO: 10.1 X10E3/UL (ref 3.4–10.8)

## 2022-05-13 ENCOUNTER — OFFICE VISIT (OUTPATIENT)
Dept: ONCOLOGY | Age: 52
End: 2022-05-13
Payer: COMMERCIAL

## 2022-05-13 VITALS
SYSTOLIC BLOOD PRESSURE: 169 MMHG | OXYGEN SATURATION: 96 % | WEIGHT: 286 LBS | BODY MASS INDEX: 38.74 KG/M2 | DIASTOLIC BLOOD PRESSURE: 103 MMHG | HEART RATE: 72 BPM | RESPIRATION RATE: 18 BRPM | HEIGHT: 72 IN

## 2022-05-13 DIAGNOSIS — C81.14 NODULAR SCLEROSIS HODGKIN LYMPHOMA OF LYMPH NODES OF AXILLA (HCC): Primary | ICD-10-CM

## 2022-05-13 DIAGNOSIS — R59.0 CERVICAL ADENOPATHY: ICD-10-CM

## 2022-05-13 DIAGNOSIS — C81.14 NODULAR SCLEROSIS HODGKIN LYMPHOMA OF LYMPH NODES OF AXILLA (HCC): ICD-10-CM

## 2022-05-13 DIAGNOSIS — R91.8 LUNG MASS: ICD-10-CM

## 2022-05-13 DIAGNOSIS — F17.200 TOBACCO DEPENDENCE: ICD-10-CM

## 2022-05-13 PROCEDURE — 99214 OFFICE O/P EST MOD 30 MIN: CPT | Performed by: INTERNAL MEDICINE

## 2022-05-13 NOTE — PROGRESS NOTES
Hematology/Oncology Note      Date: 2022    Name: Quan Pineda  : 1970        Ceci Mckeon MD         Subjective:     Chief complaint: Lymphadenopathy    History of Present Illness:   Mr. Irving Heath is 45y.o. year old male who was seen for management of Lymphadenopathy. 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 He was status post 6 cycles of ABVD chemotherapy, last treatment was on 2008. S.p Right axillary XRT. Last PET scan was in . He reported he was last seen by Oncology  about 3 years ago and has now change to a new practice . Today the patient is in the office for follow up of care. He has occasional cough from chronic smoking. He denied fever, chills, night sweat, unintentional weight loss, skin lumps or bumps, acute bleeding or bruising issues. Denied headache, acute vision change, dizziness, chest pain, worsen shortness of breath, palpitation, productive cough, nausea, vomiting, abdominal pain, altered bowel habits, dysuria, worsen bone pain or back pain, new focal numbness or weakness. Past Medical History, Family History, and Social History:    Past Medical History:   Diagnosis Date    H/O medication noncompliance     refused to take meds for bp per patient    Hypertension     Ill-defined condition     ruptured windpipe     History reviewed. No pertinent surgical history. Social History     Socioeconomic History    Marital status:      Spouse name: Not on file    Number of children: Not on file    Years of education: Not on file    Highest education level: Not on file   Occupational History    Not on file   Tobacco Use    Smoking status: Current Every Day Smoker     Packs/day: 0.50    Smokeless tobacco: Never Used   Substance and Sexual Activity    Alcohol use:  Yes    Drug use: Yes     Types: Marijuana     Comment: daily    Sexual activity: Not on file   Other Topics Concern    Not on file   Social History Narrative    Not on file     Social Determinants of Health     Financial Resource Strain:     Difficulty of Paying Living Expenses: Not on file   Food Insecurity:     Worried About Running Out of Food in the Last Year: Not on file    Iza of Food in the Last Year: Not on file   Transportation Needs:     Lack of Transportation (Medical): Not on file    Lack of Transportation (Non-Medical): Not on file   Physical Activity:     Days of Exercise per Week: Not on file    Minutes of Exercise per Session: Not on file   Stress:     Feeling of Stress : Not on file   Social Connections:     Frequency of Communication with Friends and Family: Not on file    Frequency of Social Gatherings with Friends and Family: Not on file    Attends Mu-ism Services: Not on file    Active Member of [address] [place] [address] or Organizations: Not on file    Attends Club or Organization Meetings: Not on file    Marital Status: Not on file   Intimate Partner Violence:     Fear of Current or Ex-Partner: Not on file    Emotionally Abused: Not on file    Physically Abused: Not on file    Sexually Abused: Not on file   Housing Stability:     Unable to Pay for Housing in the Last Year: Not on file    Number of [place] in the Last Year: Not on file    Unstable Housing in the Last Year: Not on file     History reviewed. No pertinent family history. Review of Systems   Constitutional: Negative for chills, diaphoresis, fever, malaise/fatigue and weight loss. Respiratory: Positive for cough. Negative for hemoptysis, shortness of breath and wheezing. Cardiovascular: Negative for chest pain, palpitations and leg swelling. Gastrointestinal: Negative for abdominal pain, diarrhea, heartburn, nausea and vomiting. Genitourinary: Negative for dysuria, frequency, hematuria and urgency. Musculoskeletal: Negative for joint pain and myalgias. Skin: Negative for itching and rash.    Neurological: Negative for dizziness, seizures, weakness and headaches. Psychiatric/Behavioral: Negative for depression. The patient does not have insomnia. Objective:     Visit Vitals  BP (!) 169/103   Pulse 72   Resp 18   Ht 6' (1.829 m)   Wt 129.7 kg (286 lb)   SpO2 96%   BMI 38.79 kg/m²       ECOG Performance Status (grade): 0  0 - able to carry on all pre-disease activity w/out restriction  1 - restricted but able to carry out light work  2 - ambulatory and can self- care but unable to carry out work  3 - bed or chair >50% of waking hours  4 - completely disable, total care, confined to bed or chair    Physical Exam  Constitutional:       General: He is not in acute distress. Appearance: Normal appearance. Eyes:      Pupils: Pupils are equal, round, and reactive to light. Cardiovascular:      Rate and Rhythm: Normal rate. Pulses: Normal pulses. Heart sounds: Normal heart sounds. No murmur heard. Pulmonary:      Effort: Pulmonary effort is normal. No respiratory distress. Breath sounds: Normal breath sounds. Abdominal:      General: There is no distension. Palpations: Abdomen is soft. There is no mass. Tenderness: There is no abdominal tenderness. There is no guarding. Musculoskeletal:         General: No swelling or tenderness. Cervical back: Neck supple. No rigidity. Lymphadenopathy:      Cervical: No cervical adenopathy. Skin:     Findings: No rash. Neurological:      Mental Status: He is alert and oriented to person, place, and time. Mental status is at baseline. Cranial Nerves: No cranial nerve deficit. Psychiatric:         Mood and Affect: Mood normal.          Diagnostics:      No results found for this or any previous visit (from the past 96 hour(s)). Imaging:  No results found for this or any previous visit.       Results for orders placed during the hospital encounter of 03/16/21    XR CHEST PA LAT    Narrative  HISTORY: Shortness of breath. EXAM: Chest.    TECHNIQUE: Two views of the chest were obtained. COMPARISON: 1/24/2018    FINDINGS: There is no pneumothorax, pneumonia or pleural effusions. Heart and  mediastinal structures are unremarkable. . Visualized bony thorax and soft  tissues are within normal limits. Impression  IMPRESSION:    1. No acute cardiopulmonary process. No change. Results for orders placed during the hospital encounter of 04/29/22    CT CHEST ABD PELV W CONT    Narrative  CT CHEST, ABDOMEN AND PELVIS, WITH IV CONTRAST    INDICATION: Nodular sclerosis Hodgkin's lymphoma    COMPARISON: CT abdomen/pelvis 11/2/2021, PET/CT 7/23/2021    TECHNIQUE: Serial axial CT images through the chest, abdomen, and pelvis were  obtained using helical technique after the uneventful intravenous administration  of 100 mL of Isovue-300. Additional coronal and sagittal reformation images  were also performed. All CT scans at this facility are performed using dose  optimization technique as appropriate to a performed exam, to include automated  exposure control, adjustment of the mA and/or kV according to patient's size  (including appropriate matching for site-specific examinations), or use of  iterative reconstruction technique. CHEST FINDINGS:  -Mediastinum: The visualized portion of the thyroid gland is unremarkable. No  evidence for thoracic aortic aneurysm or dissection. Chronic diminutive  appearance of the brachiocephalic vein. No pericardial effusion.    -Lymph Nodes: Shotty nonenlarged mediastinal and axillary lymph nodes are noted,  grossly unchanged. No hilar adenopathy.    -Lungs: Interval development of irregular multilobulated masslike opacity at the  right middle lobe, appears to be along a peribronchial region, measures 6.1 x  5.7 x 1.4 cm. Similar irregular masslike opacity at the left lower lobe  containing air bronchogram, measures 4.3 x 2.7 x 2.8 cm.  Another lobular  masslike density at the posterior right costophrenic sulcus measures 4.5 x 1.2 x  1.9 cm with a few closely adjacent satellite nodules measuring up to 7 mm. -Chest wall: Bilateral gynecomastia. ABDOMEN/PELVIS FINDINGS:  -Liver: Hepatic steatosis  -Biliary: Unremarkable  -Spleen: Spleen measures 13 cm in craniocaudal length.  -Pancreas: Unremarkable  -Adrenal glands: Unremarkable  -Kidneys: Unremarkable    -GI tract: The bowel is nonobstructed. The appendix is normal.    -Peritoneum/retroperitoneum: No free intraperitoneal air or fluid.  -Vasculature: The abdominal aorta is non-aneurysmal.    -Lymph nodes:Portacaval lymph node today measures 3.3 x 2.0 cm (image 95),  previously 3.4 x 1.8 cm. The right external iliac chain lymph node measures 2.8  x 1.4 cm (image 145), previously 2.5 x 1.4 cm. The left external iliac chain  lymph node measures 1.9 x 1.1 cm (image 145), previously 1.8 x 1.1 cm.    -Pelvis: The urinary bladderand pelvic organs are unremarkable. OSSEOUS FINDINGS:  Subtle subcentimeter sclerotic densities project at the T1 and T4 level  (reference sagittal image [id number]), not clearly seen on prior exam. Postsurgical  changes of L5-S1 posterior fusion. Multilevel degenerative spondylosis/disc  disease. Impression  1. Interval development of multiple pulmonary masses as described. -Findings are concerning for neoplastic process. Given patient's history,  lymphoma is a possibility. If there is immunocompromised state, Kaposi's sarcoma  may be a consideration. Other nonspecific metastatic disease, primary lung  malignancies, or atypical multifocal pneumonia are not excluded      2. Subtle sclerotic densities at T1 and T4 level, not clearly seen on prior  exam. Metastatic disease not excluded. -Consider whole body bone scan for further evaluation. 3. No significant interval change in size of previously described portacaval  lymph nodes and bilateral external iliac chain lymph nodes. 4. Mild splenomegaly.       5. Hepatic steatosis          Assessment:                                        1. 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 Lung mass    3. 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 # Tobacco dependence  --  Status post 6 cycles of ABVD chemotherapy, last treatment was on 1/2/2008. S.p XRT right axilary  Last PET scan was in 2014.  -- 5/3/2021 Cervical MRI reported that the cervical adenopathy identified. There appears to be prominence of the left cervical lymph node chain suggesting adenopathy in this 4 and 5 would defer however to patient's planned clinical follow-up for Hodgkin's lymphoma such as PET/CT. --  7/23/2021 PET scan reported no finding for suspicious lymph nodes for recurrent lymphoma in the neck or chest. Mildly enlarged right common iliac chain lymph nodes with moderate metabolic activity [place] 3. These lymph nodes are noted to be new from more remote PET of 2013. Continue close imaging surveillance recommended. -- 7/15/2021 Flow cytometry: No evidence of non-Hodgkin lymphoma or acute leukemia   Unremarkable LDH and CRP. -- 11/2/2021 CT reported subcentimeter right common iliac nodes without significant change in size since recent PET/CT.  + Mildly enlarged bilateral external iliac chain nodes and enlarged portacaval  node also similar to PET/CT on 7/15/2021. Recommend attention on follow-up imaging.  -- Clinical stable, asymptomatic. No B symptoms reported. -- 4/29/2022 CT [facility] reported an iInterval development of multiple pulmonary masses, findings are concerning for neoplastic process.   + No significant interval change in size of previously described portacaval lymph nodes and bilateral external iliac chain lymph nodes. + Mild splenomegaly. -- He admitted he has heavy smoking history, more than 1PPD for about 35 years.      Plan:  -- Staging PET Scan  -- Tissue diagnosis: I have discussed with IR- Dr. Karley Marin for CT guided lung mass biopsy and IR external lymph node biopsy if possible. Patient was agreeable with the plan. -- Labs collected today: LDH, HBV/HCV/HIV  -- I will see the patient back in clinic in about 2 weeks . Always sooner if required. Orders Placed This Encounter    PET/CT TUMOR IMAGE SKULL THIGH (SUB)     Standing Status:   Future     Number of Occurrences:   1     Standing Expiration Date:   6/13/2023    CT BX LUNG LT W IMAGING     Standing Status:   Future     Number of Occurrences:   1     Standing Expiration Date:   6/13/2023     Scheduling Instructions: To be scheduled with Dr. Reba Cody     Standing Status:   Future     Number of Occurrences:   1     Standing Expiration Date:   6/13/2023     Scheduling Instructions: To be scheduled with Dr. 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 ([address]    LD     Standing Status:   Future     Number of Occurrences:   1     Standing Expiration Date:   5/13/2023    HIV 1/2 AG/AB, 4TH GENERATION,W RFLX CONFIRM     Standing Status:   Future     Number of Occurrences:   1     Standing Expiration Date:   5/13/2023    HEPATITIS C AB     Standing Status:   Future     Number of Occurrences:   1     Standing Expiration Date:   5/14/2023    HEP B SURFACE AG     Standing Status:   Future     Number of Occurrences:   1     Standing Expiration Date:   5/14/2023    CBC WITH AUTOMATED DIFF     Standing Status:   Future     Number of Occurrences:   1     Standing Expiration Date:   5/14/2023           Mr. Xiomara Gallegos has a reminder for a \"due or due soon\" health maintenance. I have asked that he contact his primary care provider for follow-up on this health maintenance. All of patient's questions answered to their apparent satisfaction.  They verbally show understanding and agreement with aforementioned plan. Kalyani Gross MD  5/13/2022          Parts of this document has been produced using Dragon dictation system. Unrecognized errors in transcription may be present. Please do not hesitate to reach out for any questions or clarifications.

## 2022-05-17 ENCOUNTER — NURSE NAVIGATOR (OUTPATIENT)
Dept: OTHER | Age: 52
End: 2022-05-17

## 2022-05-17 NOTE — NURSE NAVIGATOR
Faxed biopsy orders to Dr. Caleb Noel office at [phone]. Informed pt to call scheduling at [phone] to schedule PET scan.

## 2022-05-26 ENCOUNTER — HOSPITAL ENCOUNTER (OUTPATIENT)
Dept: CT IMAGING | Age: 52
Discharge: HOME | DRG: 201 | End: 2022-05-26
Attending: RADIOLOGY | Admitting: RADIOLOGY
Payer: COMMERCIAL

## 2022-05-26 ENCOUNTER — APPOINTMENT (OUTPATIENT)
Dept: GENERAL RADIOLOGY | Age: 52
DRG: 201 | End: 2022-05-26
Attending: RADIOLOGY
Payer: COMMERCIAL

## 2022-05-26 VITALS
BODY MASS INDEX: 37.93 KG/M2 | TEMPERATURE: 98.2 F | HEIGHT: 72 IN | WEIGHT: 280 LBS | OXYGEN SATURATION: 98 % | HEART RATE: 58 BPM | DIASTOLIC BLOOD PRESSURE: 73 MMHG | SYSTOLIC BLOOD PRESSURE: 141 MMHG | RESPIRATION RATE: 14 BRPM

## 2022-05-26 LAB
ANION GAP SERPL CALC-SCNC: 5 MMOL/L (ref 3–18)
APTT PPP: 34.9 SEC (ref 23–36.4)
BUN SERPL-MCNC: 10 MG/DL (ref 7–18)
BUN/CREAT SERPL: 13 (ref 12–20)
CALCIUM SERPL-MCNC: 9.3 MG/DL (ref 8.5–10.1)
CHLORIDE SERPL-SCNC: 107 MMOL/L (ref 100–111)
CO2 SERPL-SCNC: 28 MMOL/L (ref 21–32)
CREAT SERPL-MCNC: 0.8 MG/DL (ref 0.6–1.3)
ERYTHROCYTE [DISTWIDTH] IN BLOOD BY AUTOMATED COUNT: 12.6 % (ref 11.6–14.5)
GLUCOSE SERPL-MCNC: 105 MG/DL (ref 74–99)
HCT VFR BLD AUTO: 41.6 % (ref 36–48)
HGB BLD-MCNC: 14.1 G/DL (ref 13–16)
INR PPP: 1.1 (ref 0.8–1.2)
MCH RBC QN AUTO: 28.6 PG (ref 24–34)
MCHC RBC AUTO-ENTMCNC: 33.9 G/DL (ref 31–37)
MCV RBC AUTO: 84.4 FL (ref 78–100)
NRBC # BLD: 0 K/UL (ref 0–0.01)
NRBC BLD-RTO: 0 PER 100 WBC
PLATELET # BLD AUTO: 376 K/UL (ref 135–420)
PMV BLD AUTO: 10.1 FL (ref 9.2–11.8)
POTASSIUM SERPL-SCNC: 3.9 MMOL/L (ref 3.5–5.5)
PROTHROMBIN TIME: 14.1 SEC (ref 11.5–15.2)
RBC # BLD AUTO: 4.93 M/UL (ref 4.35–5.65)
SODIUM SERPL-SCNC: 140 MMOL/L (ref 136–145)
WBC # BLD AUTO: 9.3 K/UL (ref 4.6–13.2)

## 2022-05-26 PROCEDURE — 71045 X-RAY EXAM CHEST 1 VIEW: CPT

## 2022-05-26 PROCEDURE — 32408 CORE NDL BX LNG/MED PERQ: CPT

## 2022-05-26 PROCEDURE — 88334 PATH CONSLTJ SURG CYTO XM EA: CPT

## 2022-05-26 PROCEDURE — 74011000250 HC RX REV CODE- 250: Performed by: RADIOLOGY

## 2022-05-26 PROCEDURE — 88305 TISSUE EXAM BY PATHOLOGIST: CPT

## 2022-05-26 PROCEDURE — 85610 PROTHROMBIN TIME: CPT

## 2022-05-26 PROCEDURE — 85027 COMPLETE CBC AUTOMATED: CPT

## 2022-05-26 PROCEDURE — 74011250636 HC RX REV CODE- 250/636: Performed by: RADIOLOGY

## 2022-05-26 PROCEDURE — 85730 THROMBOPLASTIN TIME PARTIAL: CPT

## 2022-05-26 PROCEDURE — 88333 PATH CONSLTJ SURG CYTO XM 1: CPT

## 2022-05-26 PROCEDURE — 0BBJ3ZX EXCISION OF LEFT LOWER LUNG LOBE, PERCUTANEOUS APPROACH, DIAGNOSTIC: ICD-10-PCS | Performed by: SURGERY

## 2022-05-26 PROCEDURE — 88312 SPECIAL STAINS GROUP 1: CPT

## 2022-05-26 PROCEDURE — 80048 BASIC METABOLIC PNL TOTAL CA: CPT

## 2022-05-26 RX ORDER — MIDAZOLAM HYDROCHLORIDE 1 MG/ML
.5-2 INJECTION, SOLUTION INTRAMUSCULAR; INTRAVENOUS
Status: DISPENSED | OUTPATIENT
Start: 2022-05-26 | End: 2022-05-26

## 2022-05-26 RX ORDER — SODIUM CHLORIDE 9 MG/ML
20 INJECTION, SOLUTION INTRAVENOUS CONTINUOUS
Status: DISCONTINUED | OUTPATIENT
Start: 2022-05-26 | End: 2022-05-26 | Stop reason: HOSPADM

## 2022-05-26 RX ORDER — ASCORBIC ACID 500 MG
1000 TABLET ORAL DAILY
COMMUNITY

## 2022-05-26 RX ORDER — BUPROPION HYDROCHLORIDE 150 MG/1
150 TABLET ORAL DAILY
COMMUNITY

## 2022-05-26 RX ORDER — LANOLIN ALCOHOL/MO/W.PET/CERES
500 CREAM (GRAM) TOPICAL DAILY
COMMUNITY

## 2022-05-26 RX ORDER — ALBUTEROL SULFATE 0.63 MG/3ML
0.63 SOLUTION RESPIRATORY (INHALATION)
Status: ON HOLD | COMMUNITY
End: 2022-05-31

## 2022-05-26 RX ORDER — LIDOCAINE HYDROCHLORIDE 10 MG/ML
30 INJECTION, SOLUTION EPIDURAL; INFILTRATION; INTRACAUDAL; PERINEURAL
Status: COMPLETED | OUTPATIENT
Start: 2022-05-26 | End: 2022-05-26

## 2022-05-26 RX ORDER — FLUMAZENIL 0.1 MG/ML
0.2 INJECTION INTRAVENOUS
Status: DISPENSED | OUTPATIENT
Start: 2022-05-26 | End: 2022-05-26

## 2022-05-26 RX ORDER — OXYCODONE AND ACETAMINOPHEN 5; 325 MG/1; MG/1
1 TABLET ORAL
Status: DISCONTINUED | OUTPATIENT
Start: 2022-05-26 | End: 2022-05-26 | Stop reason: HOSPADM

## 2022-05-26 RX ORDER — FENTANYL CITRATE 50 UG/ML
12.5-1 INJECTION, SOLUTION INTRAMUSCULAR; INTRAVENOUS
Status: DISPENSED | OUTPATIENT
Start: 2022-05-26 | End: 2022-05-26

## 2022-05-26 RX ORDER — NALOXONE HYDROCHLORIDE 0.4 MG/ML
0.2 INJECTION, SOLUTION INTRAMUSCULAR; INTRAVENOUS; SUBCUTANEOUS
Status: DISPENSED | OUTPATIENT
Start: 2022-05-26 | End: 2022-05-26

## 2022-05-26 RX ORDER — CHOLECALCIFEROL (VITAMIN D3) 125 MCG
1 CAPSULE ORAL DAILY
COMMUNITY

## 2022-05-26 RX ADMIN — MIDAZOLAM 1 MG: 1 INJECTION INTRAMUSCULAR; INTRAVENOUS at 11:20

## 2022-05-26 RX ADMIN — LIDOCAINE HYDROCHLORIDE 30 ML: 10 INJECTION, SOLUTION EPIDURAL; INFILTRATION; INTRACAUDAL; PERINEURAL at 11:10

## 2022-05-26 RX ADMIN — FENTANYL CITRATE 50 MCG: 50 INJECTION, SOLUTION INTRAMUSCULAR; INTRAVENOUS at 11:20

## 2022-05-26 RX ADMIN — FENTANYL CITRATE 50 MCG: 50 INJECTION, SOLUTION INTRAMUSCULAR; INTRAVENOUS at 11:10

## 2022-05-26 RX ADMIN — MIDAZOLAM 1 MG: 1 INJECTION INTRAMUSCULAR; INTRAVENOUS at 11:10

## 2022-05-26 NOTE — H&P
History and Physical    Patient: Billy Acevedo           Sex: male       DOA: 5/26/2022  YOB: 1970      Age:  45 y.o.     LOS:  LOS: 0 days        HPI:     Billy Acevedo is a 45 y.o. male who has history of otbacco use and lymphoma with a left lung mass and possible lymphadenopathy, here for a left lung mass biopsy and possible lymph node biopsy with moderate sedation. Past Medical History:   Diagnosis Date    H/O medication noncompliance     refused to take meds for bp per patient    Hypertension     Ill-defined condition     ruptured windpipe       History reviewed. No pertinent surgical history. History reviewed. No pertinent family history. Social History     Socioeconomic History    Marital status:    Tobacco Use    Smoking status: Current Every Day Smoker     Packs/day: 0.50    Smokeless tobacco: Never Used   Substance and Sexual Activity    Alcohol use: Yes    Drug use: Yes     Types: Marijuana     Comment: daily       Prior to Admission medications    Medication Sig Start Date End Date Taking? Authorizing Provider   buPROPion XL (WELLBUTRIN XL) 150 mg tablet Take 150 mg by mouth daily. Indications: stop smoking   Yes Provider, Historical   cyanocobalamin (Vitamin B-12) 500 mcg tablet Take 500 mcg by mouth daily. Yes Provider, Historical   cholecalciferol, vitamin D3, 50 mcg (2,000 unit) tab Take 125 mcg by mouth. Yes Provider, Historical   ascorbic acid, vitamin C, (Vitamin C) 500 mg tablet Take 1,000 mg by mouth. Yes Provider, Historical   albuterol (ACCUNEB) 0.63 mg/3 mL nebulizer solution 0.63 mg by Nebulization route every six (6) hours as needed for Wheezing.    Yes Provider, Historical   losartan (COZAAR) 50 mg tablet TAKE 1 TABLET BY MOUTH EVERY DAY 4/12/21  Yes Provider, Historical   gabapentin (NEURONTIN) 300 mg capsule TAKE 1 CAPSULE BY MOUTH THREE TIMES A DAY 6/7/21  Yes Provider, Historical   HYDROcodone-acetaminophen (NORCO) 5-325 mg per tablet TAKE 1 TO 2 TABLETS BY MOUTH AT BEDTIME  Patient not taking: Reported on 11/11/2021 6/7/21   Provider, Historical       No Known Allergies    Physical Exam:      Visit Vitals  /82 (BP 1 Location: Left arm, BP Patient Position: At rest)   Pulse 60   Temp 98.2 °F (36.8 °C)   Resp 18   Ht 6' (1.829 m)   Wt 127 kg (280 lb)   SpO2 95%   BMI 37.97 kg/m²     Physical Exam:  Mallampati 2 ASA 3  General: A&O x 4, NAD  Heart: RRR  Lungs: Normal work of breathing on room air    Labs Reviewed: All lab results for the last 24 hours reviewed.     Assessment/Plan     The patient is an appropriate candidate to undergo the planned procedure and sedation    AURELIANO Rick

## 2022-05-26 NOTE — DISCHARGE INSTRUCTIONS
Patient Education        Percutaneous Lung Biopsy: What to Expect at Home  Your Recovery     A needle biopsy of the lung is a procedure to take a sample (biopsy) of lung tissue. The doctor puts a long needle through your chest wall to get the sample. Another doctor will look at the lung tissue with a microscope to check for infection, cancer, or other lung problems. You may be sore where the doctor made the cut (incision) in your skin and put in the biopsy needle. You may feel some pain in your lung when you take a deep breath. These symptoms usually get better in a few days. If you cough up mucus, there may be streaks of blood in the mucus for the first week after the procedure. You may need to take it easy at home for a day or two after the procedure. For 1 week, try to avoid heavy lifting and strenuous activities. These activities could cause bleeding from the biopsy site. It can take several days to get the results of the biopsy. The doctor or nurse will discuss the results with you. This care sheet gives you a general idea about how long it will take for you to recover. But each person recovers at a different pace. Follow the steps below to feel better as quickly as possible. How can you care for yourself at home? Activity    · Rest when you feel tired. Getting enough sleep will help you recover.     · Try to walk each day. Start by walking a little more than you did the day before. Bit by bit, increase the amount you walk. Walking boosts blood flow and helps prevent pneumonia and constipation.     · Avoid strenuous activities, such as bicycle riding, jogging, weight lifting, or aerobic exercise, for 1 week or until your doctor says it is okay.     · For 1 week, avoid lifting anything that would make you strain. This may include a child, heavy grocery bags and milk containers, a heavy briefcase or backpack, cat litter or dog food bags, or a vacuum .     · Ask your doctor when you can drive again.   · You may need to take 1 or 2 days off from work. It depends on the type of work you do and how you feel.     · You may shower 1 or 2 days after the procedure, if your doctor says it is okay. Pat the incision dry. Do not take a bath for the first week, or until your doctor tells you it is okay.     · Do not fly in an airplane or dive deeply (such as in scuba diving) until your doctor tells you it is okay. Avoid any situations where there is increased air pressure. Diet    · You can eat your normal diet. If your stomach is upset, try bland, low-fat foods like plain rice, broiled chicken, toast, and yogurt. Medicines    · Your doctor will tell you if and when you can restart your medicines. He or she will also give you instructions about taking any new medicines.     · If you take aspirin or some other blood thinner, ask your doctor if and when to start taking it again. Make sure that you understand exactly what your doctor wants you to do.     · Be safe with medicines. Take pain medicines exactly as directed. ? If the doctor gave you a prescription medicine for pain, take it as prescribed. ? If you are not taking a prescription pain medicine, ask your doctor if you can take an over-the-counter medicine.     · If you think your pain medicine is making you sick to your stomach:  ? Take your medicine after meals (unless your doctor has told you not to). ? Ask your doctor for a different pain medicine.     · If your doctor prescribed antibiotics, take them as directed. Do not stop taking them just because you feel better. You need to take the full course of antibiotics. Incision care    · If you have strips of tape on the incision, leave the tape on for a week or until it falls off.     · Wash the area daily with warm, soapy water and pat it dry. Don't use hydrogen peroxide or alcohol, which can slow healing. You may cover the area with a gauze bandage if it weeps or rubs against clothing.  Change the bandage every day.     · Keep the area clean and dry. Follow-up care is a key part of your treatment and safety. Be sure to make and go to all appointments, and call your doctor if you are having problems. It's also a good idea to know your test results and keep a list of the medicines you take. When should you call for help? Call 911 anytime you think you may need emergency care. For example, call if:    · You passed out (lost consciousness).     · You have severe trouble breathing.     · You have sudden chest pain and shortness of breath, or you cough up blood. Call your doctor now or seek immediate medical care if:    · You are sick to your stomach or cannot keep fluids down.     · You have pain that does not get better after you take pain medicine.     · You have a fever over 100°F.     · You have signs of infection, such as:  ? Increased pain, swelling, warmth, or redness. ? Red streaks leading from the incision. ? Pus draining from the incision. ? Swollen lymph nodes in your neck, armpits, or groin. ? A fever.     · Bright red blood has soaked through the bandage over your incision.     · You cough up a lot more mucus than normal, or the mucus changes color. Watch closely for changes in your health, and be sure to contact your doctor if you have any problems. Where can you learn more? Go to http://www.gray.com/  Enter M527 in the search box to learn more about \"Percutaneous Lung Biopsy: What to Expect at Home. \"  Current as of: July 6, 2021               Content Version: 13.2  © 2006-2022 [employer]. Care instructions adapted under license by [employer] (which disclaims liability or warranty for this information). If you have questions about a medical condition or this instruction, always ask your healthcare professional. [place] [address] any warranty or liability for your use of this information.          DISCHARGE SUMMARY from Nurse    PATIENT INSTRUCTIONS:    After general anesthesia or intravenous sedation, for 24 hours or while taking prescription Narcotics:  · Limit your activities  · Do not drive and operate hazardous machinery  · Do not make important personal or business decisions  · Do  not drink alcoholic beverages  · If you have not urinated within 8 hours after discharge, please contact your surgeon on call. Report the following to your surgeon:  · Excessive pain, swelling, redness or odor of or around the surgical area  · Temperature over 100.5  · Nausea and vomiting lasting longer than 4 hours or if unable to take medications  · Any signs of decreased circulation or nerve impairment to extremity: change in color, persistent  numbness, tingling, coldness or increase pain  · Any questions    What to do at Home:      *  Please give a list of your current medications to your Primary Care Provider. *  Please update this list whenever your medications are discontinued, doses are      changed, or new medications (including over-the-counter products) are added. *  Please carry medication information at all times in case of emergency situations. These are general instructions for a healthy lifestyle:    No smoking/ No tobacco products/ Avoid exposure to second hand smoke  Surgeon General's Warning:  Quitting smoking now greatly reduces serious risk to your health. Obesity, smoking, and sedentary lifestyle greatly increases your risk for illness    A healthy diet, regular physical exercise & weight monitoring are important for maintaining a healthy lifestyle    You may be retaining fluid if you have a history of heart failure or if you experience any of the following symptoms:  Weight gain of 3 pounds or more overnight or 5 pounds in a week, increased swelling in our hands or feet or shortness of breath while lying flat in bed.   Please call your doctor as soon as you notice any of these symptoms; do not wait until your next office visit. The discharge information has been reviewed with the {PATIENT PARENT GUARDIAN:61881}. The {PATIENT PARENT GUARDIAN:86192} verbalized understanding. Discharge medications reviewed with the {Dishcarge meds reviewed AOFD:15892} and appropriate educational materials and side effects teaching were provided.   ___________________________________________________________________________________________________________________________________

## 2022-05-26 NOTE — PROCEDURES
VASCULAR & INTERVENTIONAL RADIOLOGY PROCEDURE NOTE    Procedure: Percutaneous CT-guided LLL Biopsy    Pre-operative Diagnosis: LLL lung mass    Post-operative Diagnosis: same    Indications: Tissue diagnosis    Sedation: Versed and Fentanyl      Findings:    Successful CT guided biopsy  Target: LLL mass  Biopsy Needle: 20G [facility] [place]  Passes: 6  Tract embolization: None    On initial CT imaging, there were no suspicious enlarged lymph nodes that could be targeted for biopsy. Specimens: 6 cores of tissue. Complications:  None. Condition: Stable. Disposition: Home after recovery. CXR now and in 2 hours.

## 2022-05-26 NOTE — PROGRESS NOTES
TRANSFER - OUT REPORT:    Verbal report given to [facility] RN(name) on Sveta Espino  being transferred to Phase 2(unit) for routine post - op       Report consisted of patients Situation, Background, Assessment and   Recommendations(SBAR). Information from the following report(s) SBAR, Kardex, Procedure Summary and MAR was reviewed with the receiving nurse. Lines:   Peripheral IV 05/26/22 Posterior;Right Hand (Active)   Site Assessment Clean, dry, & intact 05/26/22 0911   Phlebitis Assessment 0 05/26/22 0911   Infiltration Assessment 0 05/26/22 0911   Dressing Status Clean, dry, & intact 05/26/22 0911   Dressing Type Tape;Transparent 05/26/22 0911   Hub Color/Line Status Pink; Infusing;Flushed;Patent 05/26/22 0911   Action Taken Dressing reinforced 05/26/22 0911   Alcohol Cap Used No 05/26/22 0911        Opportunity for questions and clarification was provided.       Patient transported with:   Registered Nurse

## 2022-05-27 ENCOUNTER — APPOINTMENT (OUTPATIENT)
Dept: CT IMAGING | Age: 52
DRG: 201 | End: 2022-05-27
Attending: PHYSICIAN ASSISTANT
Payer: COMMERCIAL

## 2022-05-27 ENCOUNTER — HOSPITAL ENCOUNTER (OUTPATIENT)
Dept: PET IMAGING | Age: 52
Discharge: HOME | DRG: 201 | End: 2022-05-27
Attending: INTERNAL MEDICINE
Payer: COMMERCIAL

## 2022-05-27 ENCOUNTER — HOSPITAL ENCOUNTER (INPATIENT)
Age: 52
LOS: 4 days | Discharge: HOME | DRG: 201 | End: 2022-05-31
Attending: EMERGENCY MEDICINE | Admitting: STUDENT IN AN ORGANIZED HEALTH CARE EDUCATION/TRAINING PROGRAM
Payer: COMMERCIAL

## 2022-05-27 ENCOUNTER — APPOINTMENT (OUTPATIENT)
Dept: GENERAL RADIOLOGY | Age: 52
DRG: 201 | End: 2022-05-27
Attending: PHYSICIAN ASSISTANT
Payer: COMMERCIAL

## 2022-05-27 DIAGNOSIS — C81.90 HODGKIN LYMPHOMA, UNSPECIFIED HODGKIN LYMPHOMA TYPE, UNSPECIFIED BODY REGION (HCC): ICD-10-CM

## 2022-05-27 DIAGNOSIS — F17.200 TOBACCO DEPENDENCE SYNDROME: ICD-10-CM

## 2022-05-27 DIAGNOSIS — J44.9 COPD, MILD (HCC): ICD-10-CM

## 2022-05-27 DIAGNOSIS — G47.33 OBSTRUCTIVE SLEEP APNEA SYNDROME: ICD-10-CM

## 2022-05-27 DIAGNOSIS — J95.811 PNEUMOTHORAX OF LEFT LUNG AFTER BIOPSY: ICD-10-CM

## 2022-05-27 DIAGNOSIS — J93.9 PNEUMOTHORAX, UNSPECIFIED TYPE: Primary | ICD-10-CM

## 2022-05-27 PROBLEM — M54.12 CERVICAL RADICULOPATHY: Status: ACTIVE | Noted: 2022-05-27

## 2022-05-27 PROBLEM — I10 ESSENTIAL HYPERTENSION: Status: ACTIVE | Noted: 2022-05-27

## 2022-05-27 PROBLEM — E66.01 MORBID OBESITY (HCC): Status: ACTIVE | Noted: 2022-05-27

## 2022-05-27 LAB
ALBUMIN SERPL-MCNC: 3.7 G/DL (ref 3.4–5)
ALBUMIN/GLOB SERPL: 1 {RATIO} (ref 0.8–1.7)
ALP SERPL-CCNC: 66 U/L (ref 45–117)
ALT SERPL-CCNC: 31 U/L (ref 16–61)
ANION GAP SERPL CALC-SCNC: 3 MMOL/L (ref 3–18)
AST SERPL-CCNC: 16 U/L (ref 10–38)
BASOPHILS # BLD: 0.1 K/UL (ref 0–0.1)
BASOPHILS NFR BLD: 1 % (ref 0–2)
BILIRUB SERPL-MCNC: 0.6 MG/DL (ref 0.2–1)
BUN SERPL-MCNC: 9 MG/DL (ref 7–18)
BUN/CREAT SERPL: 11 (ref 12–20)
CALCIUM SERPL-MCNC: 9.5 MG/DL (ref 8.5–10.1)
CHLORIDE SERPL-SCNC: 106 MMOL/L (ref 100–111)
CO2 SERPL-SCNC: 28 MMOL/L (ref 21–32)
CREAT SERPL-MCNC: 0.84 MG/DL (ref 0.6–1.3)
DIFFERENTIAL METHOD BLD: ABNORMAL
EOSINOPHIL # BLD: 0.2 K/UL (ref 0–0.4)
EOSINOPHIL NFR BLD: 2 % (ref 0–5)
ERYTHROCYTE [DISTWIDTH] IN BLOOD BY AUTOMATED COUNT: 12.5 % (ref 11.6–14.5)
GLOBULIN SER CALC-MCNC: 3.8 G/DL (ref 2–4)
GLUCOSE SERPL-MCNC: 103 MG/DL (ref 74–99)
HCT VFR BLD AUTO: 40.2 % (ref 36–48)
HGB BLD-MCNC: 14.1 G/DL (ref 13–16)
IMM GRANULOCYTES # BLD AUTO: 0 K/UL (ref 0–0.04)
IMM GRANULOCYTES NFR BLD AUTO: 0 % (ref 0–0.5)
INR PPP: 1 (ref 0.8–1.2)
LYMPHOCYTES # BLD: 1.8 K/UL (ref 0.9–3.6)
LYMPHOCYTES NFR BLD: 16 % (ref 21–52)
MCH RBC QN AUTO: 29.4 PG (ref 24–34)
MCHC RBC AUTO-ENTMCNC: 35.1 G/DL (ref 31–37)
MCV RBC AUTO: 83.9 FL (ref 78–100)
MONOCYTES # BLD: 0.7 K/UL (ref 0.05–1.2)
MONOCYTES NFR BLD: 6 % (ref 3–10)
NEUTS SEG # BLD: 8.2 K/UL (ref 1.8–8)
NEUTS SEG NFR BLD: 75 % (ref 40–73)
NRBC # BLD: 0 K/UL (ref 0–0.01)
NRBC BLD-RTO: 0 PER 100 WBC
PLATELET # BLD AUTO: 369 K/UL (ref 135–420)
PMV BLD AUTO: 9.7 FL (ref 9.2–11.8)
POTASSIUM SERPL-SCNC: 3.7 MMOL/L (ref 3.5–5.5)
PROT SERPL-MCNC: 7.5 G/DL (ref 6.4–8.2)
PROTHROMBIN TIME: 13.9 SEC (ref 11.5–15.2)
RBC # BLD AUTO: 4.79 M/UL (ref 4.35–5.65)
SODIUM SERPL-SCNC: 137 MMOL/L (ref 136–145)
WBC # BLD AUTO: 11 K/UL (ref 4.6–13.2)

## 2022-05-27 PROCEDURE — 80053 COMPREHEN METABOLIC PANEL: CPT

## 2022-05-27 PROCEDURE — 71046 X-RAY EXAM CHEST 2 VIEWS: CPT

## 2022-05-27 PROCEDURE — 2709999900 HC NON-CHARGEABLE SUPPLY

## 2022-05-27 PROCEDURE — 65660000004 HC RM CVT STEPDOWN

## 2022-05-27 PROCEDURE — 85025 COMPLETE CBC W/AUTO DIFF WBC: CPT

## 2022-05-27 PROCEDURE — 74011000250 HC RX REV CODE- 250: Performed by: NURSE PRACTITIONER

## 2022-05-27 PROCEDURE — 99223 1ST HOSP IP/OBS HIGH 75: CPT | Performed by: STUDENT IN AN ORGANIZED HEALTH CARE EDUCATION/TRAINING PROGRAM

## 2022-05-27 PROCEDURE — 71045 X-RAY EXAM CHEST 1 VIEW: CPT

## 2022-05-27 PROCEDURE — 93005 ELECTROCARDIOGRAM TRACING: CPT

## 2022-05-27 PROCEDURE — A9552 F18 FDG: HCPCS

## 2022-05-27 PROCEDURE — 74011000250 HC RX REV CODE- 250: Performed by: RADIOLOGY

## 2022-05-27 PROCEDURE — 74011250637 HC RX REV CODE- 250/637: Performed by: NURSE PRACTITIONER

## 2022-05-27 PROCEDURE — 94762 N-INVAS EAR/PLS OXIMTRY CONT: CPT

## 2022-05-27 PROCEDURE — 99152 MOD SED SAME PHYS/QHP 5/>YRS: CPT

## 2022-05-27 PROCEDURE — 85610 PROTHROMBIN TIME: CPT

## 2022-05-27 PROCEDURE — 74011250636 HC RX REV CODE- 250/636: Performed by: RADIOLOGY

## 2022-05-27 PROCEDURE — 74011250636 HC RX REV CODE- 250/636: Performed by: NURSE PRACTITIONER

## 2022-05-27 PROCEDURE — 74011250637 HC RX REV CODE- 250/637: Performed by: PHYSICIAN ASSISTANT

## 2022-05-27 PROCEDURE — 99285 EMERGENCY DEPT VISIT HI MDM: CPT

## 2022-05-27 PROCEDURE — APPSS180 APP SPLIT SHARED TIME > 60 MINUTES: Performed by: NURSE PRACTITIONER

## 2022-05-27 PROCEDURE — 0W9B30Z DRAINAGE OF LEFT PLEURAL CAVITY WITH DRAINAGE DEVICE, PERCUTANEOUS APPROACH: ICD-10-PCS | Performed by: RADIOLOGY

## 2022-05-27 RX ORDER — POLYETHYLENE GLYCOL 3350 17 G/17G
17 POWDER, FOR SOLUTION ORAL DAILY PRN
Status: DISCONTINUED | OUTPATIENT
Start: 2022-05-27 | End: 2022-06-01 | Stop reason: HOSPADM

## 2022-05-27 RX ORDER — FLUDEOXYGLUCOSE F-18 200 MCI/ML
10 INJECTION INTRAVENOUS ONCE
OUTPATIENT
Start: 2022-05-27 | End: 2022-05-27

## 2022-05-27 RX ORDER — LIDOCAINE HYDROCHLORIDE 10 MG/ML
20 INJECTION, SOLUTION EPIDURAL; INFILTRATION; INTRACAUDAL; PERINEURAL
Status: COMPLETED | OUTPATIENT
Start: 2022-05-27 | End: 2022-05-27

## 2022-05-27 RX ORDER — SODIUM CHLORIDE 0.9 % (FLUSH) 0.9 %
5-40 SYRINGE (ML) INJECTION AS NEEDED
Status: DISCONTINUED | OUTPATIENT
Start: 2022-05-27 | End: 2022-06-01 | Stop reason: HOSPADM

## 2022-05-27 RX ORDER — LIDOCAINE 4 G/100G
1 PATCH TOPICAL EVERY 24 HOURS
Status: DISCONTINUED | OUTPATIENT
Start: 2022-05-27 | End: 2022-06-01 | Stop reason: HOSPADM

## 2022-05-27 RX ORDER — ACETAMINOPHEN 325 MG/1
650 TABLET ORAL
Status: DISCONTINUED | OUTPATIENT
Start: 2022-05-27 | End: 2022-06-01 | Stop reason: HOSPADM

## 2022-05-27 RX ORDER — MIDAZOLAM HYDROCHLORIDE 1 MG/ML
.5-2 INJECTION, SOLUTION INTRAMUSCULAR; INTRAVENOUS
Status: DISCONTINUED | OUTPATIENT
Start: 2022-05-27 | End: 2022-05-27 | Stop reason: ALTCHOICE

## 2022-05-27 RX ORDER — BUPROPION HYDROCHLORIDE 150 MG/1
150 TABLET ORAL DAILY
Status: DISCONTINUED | OUTPATIENT
Start: 2022-05-28 | End: 2022-06-01 | Stop reason: HOSPADM

## 2022-05-27 RX ORDER — LOSARTAN POTASSIUM 50 MG/1
50 TABLET ORAL DAILY
Status: DISCONTINUED | OUTPATIENT
Start: 2022-05-28 | End: 2022-06-01 | Stop reason: HOSPADM

## 2022-05-27 RX ORDER — IBUPROFEN 200 MG
1 TABLET ORAL DAILY
Status: DISCONTINUED | OUTPATIENT
Start: 2022-05-27 | End: 2022-06-01 | Stop reason: HOSPADM

## 2022-05-27 RX ORDER — GABAPENTIN 300 MG/1
300 CAPSULE ORAL DAILY
Status: DISCONTINUED | OUTPATIENT
Start: 2022-05-28 | End: 2022-06-01 | Stop reason: HOSPADM

## 2022-05-27 RX ORDER — FLUDEOXYGLUCOSE F-18 200 MCI/ML
10 INJECTION INTRAVENOUS ONCE
Status: COMPLETED | OUTPATIENT
Start: 2022-05-27 | End: 2022-05-27

## 2022-05-27 RX ORDER — ALBUTEROL SULFATE 0.83 MG/ML
2.5 SOLUTION RESPIRATORY (INHALATION)
Status: DISCONTINUED | OUTPATIENT
Start: 2022-05-27 | End: 2022-06-01 | Stop reason: HOSPADM

## 2022-05-27 RX ORDER — FENTANYL CITRATE 50 UG/ML
12.5-1 INJECTION, SOLUTION INTRAMUSCULAR; INTRAVENOUS
Status: DISCONTINUED | OUTPATIENT
Start: 2022-05-27 | End: 2022-05-27 | Stop reason: ALTCHOICE

## 2022-05-27 RX ORDER — ONDANSETRON 8 MG/1
4 TABLET, ORALLY DISINTEGRATING ORAL
Status: DISCONTINUED | OUTPATIENT
Start: 2022-05-27 | End: 2022-06-01 | Stop reason: HOSPADM

## 2022-05-27 RX ORDER — ONDANSETRON 2 MG/ML
4 INJECTION INTRAMUSCULAR; INTRAVENOUS
Status: DISCONTINUED | OUTPATIENT
Start: 2022-05-27 | End: 2022-06-01 | Stop reason: HOSPADM

## 2022-05-27 RX ORDER — SODIUM CHLORIDE 0.9 % (FLUSH) 0.9 %
5-40 SYRINGE (ML) INJECTION EVERY 8 HOURS
Status: DISCONTINUED | OUTPATIENT
Start: 2022-05-27 | End: 2022-06-01 | Stop reason: HOSPADM

## 2022-05-27 RX ORDER — MORPHINE SULFATE 2 MG/ML
2 INJECTION, SOLUTION INTRAMUSCULAR; INTRAVENOUS
Status: DISCONTINUED | OUTPATIENT
Start: 2022-05-27 | End: 2022-05-31

## 2022-05-27 RX ADMIN — FENTANYL CITRATE 50 MCG: 50 INJECTION, SOLUTION INTRAMUSCULAR; INTRAVENOUS at 16:50

## 2022-05-27 RX ADMIN — FENTANYL CITRATE 50 MCG: 50 INJECTION, SOLUTION INTRAMUSCULAR; INTRAVENOUS at 16:55

## 2022-05-27 RX ADMIN — FLUDEOXYGLUCOSE F-18 8 MILLICURIE: 200 INJECTION INTRAVENOUS at 10:45

## 2022-05-27 RX ADMIN — MIDAZOLAM 1 MG: 1 INJECTION INTRAMUSCULAR; INTRAVENOUS at 17:00

## 2022-05-27 RX ADMIN — MIDAZOLAM 1 MG: 1 INJECTION INTRAMUSCULAR; INTRAVENOUS at 16:50

## 2022-05-27 RX ADMIN — LIDOCAINE HYDROCHLORIDE 20 ML: 10 INJECTION, SOLUTION EPIDURAL; INFILTRATION; INTRACAUDAL; PERINEURAL at 16:53

## 2022-05-27 RX ADMIN — MORPHINE SULFATE 2 MG: 2 INJECTION, SOLUTION INTRAMUSCULAR; INTRAVENOUS at 19:47

## 2022-05-27 RX ADMIN — FENTANYL CITRATE 50 MCG: 50 INJECTION, SOLUTION INTRAMUSCULAR; INTRAVENOUS at 17:00

## 2022-05-27 RX ADMIN — MIDAZOLAM 1 MG: 1 INJECTION INTRAMUSCULAR; INTRAVENOUS at 16:55

## 2022-05-27 RX ADMIN — SODIUM CHLORIDE, PRESERVATIVE FREE 10 ML: 5 INJECTION INTRAVENOUS at 21:21

## 2022-05-27 RX ADMIN — ACETAMINOPHEN 650 MG: 325 TABLET ORAL at 21:55

## 2022-05-27 NOTE — PROGRESS NOTES
1900: Bedside shift change report given to RUDOLPH Castillo (oncoming nurse) by Edgardo Sharma (offgoing nurse). Report included the following information SBAR, Kardex, Intake/Output, MAR, Recent Results and Cardiac Rhythm Sinus Rhythm. 1930: Shift assessment done. pt A/Ox4, on Room air. V/s obtained. No signs of distress noted. No c/o SOB but c/o Left rib pain due to  chest tube insertion. Pain medication administered, See MAR. No needs voiced. Call light within reach. 2000: Pt requested for O2 so he can go to sleep. Administered [facility] per patient request and per order. 2030: pt is sleeping comfortably no needs voiced. 2155: pt c/o Left rib pain. Administered pain med per request. See MAR.    1899: Shift reassessment done. No changes from previous assessment. V/S obtained, no signs of distress noted. No c/o pain and SOB. Pt resting comfortably. No other needs expressed. Call light within reach. 0100: pt sleeping comfortably. 0350: Shift reassessment done. V/S obtained. No changes from previous assessment. No signs of distress noted. No c/o pain or SOB. Pt sleeping comfortably. No needs expressed. 0440: patient complaints of pain. Pain medicine administered as requested. 0700: Bedside shift change report given to Tricia Delong 1841 (oncoming nurse) by Nhi Ram (offgoing nurse). Report included the following information SBAR, Kardex, Intake/Output, MAR, Recent Results and Cardiac Rhythm NSR.

## 2022-05-27 NOTE — PROGRESS NOTES
Interventional Radiology    Notified that the patient has a 50% collapse of his left lung as noted on PET CT today. A left lung mass biopsy was performed yesterday 5/26/22 - the patient was discharged after two unremarkable CXRs. The patient was called and asked to present to the ER. IV pain medication and local numbing will be used as the patient has eaten. No blood thinners. IR will remain available for chest tube placement as needed. Please call [phone] if the patient presents during normal business hours or the on-call IR/CT MD he presents after 5 pm.    Please consult pulmonary or cardiothoracics for chest tube management once the patient arrives in the ER.     Discussed with the patient, ER MD, and diagnostic radiologist.    Gino beltre,  Naresh Barriga, [address]

## 2022-05-27 NOTE — PROGRESS NOTES
TRANSFER - IN REPORT:    Verbal report received from RUDOLPH Majano(name) on Inell Means  being received from ED(unit) for routine progression of care      Report consisted of patients Situation, Background, Assessment and   Recommendations(SBAR). Information from the following report(s) SBAR, ED Summary, [facility] and Cardiac Rhythm NSR was reviewed with the receiving nurse. Opportunity for questions and clarification was provided. Assessment completed upon patients arrival to unit and care assumed. 1650: report received, patient currently in IR for chest tube placement. 1730: pt arrived to unit, bedside report received from [facility] in IR, left chest tube to suction at 20cm. Output to be recorded Q8hrs. Currently no output. 1830 Extender added to suction so patient can ambulate to the bathroom. Pt refusing to wear yellow socks when ambulating. Incentive spirometer given to pt and instructed on use.

## 2022-05-27 NOTE — CONSULTS
Interventional Radiology Consult Note  Patient: Keven Mireles               Sex: male          DOA: 5/27/2022       YOB: 1970      Age:  45 y.o.        LOS:  LOS: 0 days              Assessment   Left pneumothorax s/p left lung biopsy 5/26/22  Hx lymphoma  Hx tobacco use    Left chest tube placement is needed at this time to prevent tension pneumothorax. Case and images reviewed by Dr. Katrina Pulliam. Plan     1. Image guided left chest tube placement today as schedule allows  2. Pulmonary consult in place for weekend chest tube management - thank you! Thank you,  Fortunato Rickma  [phone]    HPI:     Keven Mireles is a 45 y.o. male who has been seen in evaluation of left pneumothorax at the request of Dr. Patricia Pemberton. Keven Mireles presented to [facility] 5/27/22 for left pneumothorax after a left lung biopsy yesterday 5/26/22. He was discharged after 3 hours with no immediate post procedure PTX. Outpatient PET CT today showed a left lung collapse of 50%. The patient does not take any blood thinning medication at home. He did eat lunch today. Vitals are stable. Today, the patient endorses left chest pressure and mild dyspnea. He denies N/V, fever, chills, SQ crepitus, HA, dizziness. The anticipated procedure was discussed in detail including risk of injury, infection, and bleeding. All questions were answered and concerns addressed. Informed consents were obtained. Past Medical History:   Diagnosis Date    H/O medication noncompliance     refused to take meds for bp per patient    Hypertension     Ill-defined condition     ruptured windpipe     No past surgical history on file. No family history on file. Social History     Socioeconomic History    Marital status:    Tobacco Use    Smoking status: Current Every Day Smoker     Packs/day: 0.50    Smokeless tobacco: Never Used   Substance and Sexual Activity    Alcohol use:  Yes    Drug use: Yes     Types: Marijuana Comment: daily     Prior to Admission medications    Medication Sig Start Date End Date Taking? Authorizing Provider   buPROPion XL (WELLBUTRIN XL) 150 mg tablet Take 150 mg by mouth daily. Indications: stop smoking    Provider, Historical   cyanocobalamin (Vitamin B-12) 500 mcg tablet Take 500 mcg by mouth daily. Provider, Historical   cholecalciferol, vitamin D3, 50 mcg (2,000 unit) tab Take 125 mcg by mouth. Provider, Historical   ascorbic acid, vitamin C, (Vitamin C) 500 mg tablet Take 1,000 mg by mouth. Provider, Historical   albuterol (ACCUNEB) 0.63 mg/3 mL nebulizer solution 0.63 mg by Nebulization route every six (6) hours as needed for Wheezing.     Provider, Historical   losartan (COZAAR) 50 mg tablet TAKE 1 TABLET BY MOUTH EVERY DAY 4/12/21   Provider, Historical   HYDROcodone-acetaminophen (NORCO) 5-325 mg per tablet TAKE 1 TO 2 TABLETS BY MOUTH AT BEDTIME  Patient not taking: Reported on 11/11/2021 6/7/21   Provider, Historical   gabapentin (NEURONTIN) 300 mg capsule TAKE 1 CAPSULE BY MOUTH THREE TIMES A DAY 6/7/21   Provider, Historical     No Known Allergies  Review of Systems  As above    Physical Exam:      Visit Vitals  /81 (BP 1 Location: Left upper arm, BP Patient Position: At rest;Sitting)   Pulse 66   Temp 98.4 °F (36.9 °C)   Resp 22   Ht 6' (1.829 m)   Wt 127 kg (280 lb)   SpO2 96%   BMI 37.97 kg/m²     Physical Exam:  Constitutional: Awake and alert and oriented x 4, NAD  Respiratory: Normal work of breathing, equal chest rise and fall  Cardiovascular: RRR  Gastrointestinal: Soft NT ND  Extremities: Skin warm and dry, moves all four     Labs Reviewed:  Plt 376  WBC 9.3  INR 1.1  K 3.9  Crt 0.8    Blood Thinners:  None

## 2022-05-27 NOTE — ED NOTES
TRANSFER - OUT REPORT:    Verbal report given to [facility] (name) on Florinda Hernandez  being transferred to [facility](unit) for routine progression of care       Report consisted of patients Situation, Background, Assessment and   Recommendations(SBAR). Information from the following report(s) SBAR, ED Summary and MAR was reviewed with the receiving nurse. Lines:   Peripheral IV 05/27/22 Right Antecubital (Active)   Site Assessment Clean, dry, & intact 05/27/22 1608   Phlebitis Assessment 0 05/27/22 1608   Infiltration Assessment 0 05/27/22 1608   Dressing Status Clean, dry, & intact 05/27/22 1608   Dressing Type Transparent;Tape 05/27/22 1608   Hub Color/Line Status Pink;Patent; Flushed 05/27/22 1608   Action Taken Blood drawn 05/27/22 1608   Alcohol Cap Used No 05/27/22 1608        Opportunity for questions and clarification was provided.       Patient transported with:   Monitor   Nurse

## 2022-05-27 NOTE — PROGRESS NOTES
Verbal report given to Ti Arce RN on CVTSD. Patient NAD, VSS and A&O x3. Patient able to maintain oral airway and does not need supplemental O2. Patient able to move all extremities appropriately. All questions answered.

## 2022-05-27 NOTE — PROCEDURES
RADIOLOGY POST PROCEDURE NOTE     May 27, 2022       5:20 PM     Preoperative Diagnosis:   Left PTX after Bx. Postoperative Diagnosis:  Same. :  Dr. Caleb Noel    Assistant:  None. Type of Anesthesia: 1% plain lidocaine and IV moderate sedation. Procedure/Description:  Image guided left chest tube placement. Findings:   No bleeding. Estimated blood Loss:  Minimal    Specimen Removed:   no    Blood transfusions:  None. Implants:  24F ThalQuick left chest tube to suction.     Complications: None    Condition: Stable    Discharge Plan:  continue present therapy    Melisa Schuler MD

## 2022-05-27 NOTE — ED PROVIDER NOTES
EMERGENCY DEPARTMENT HISTORY AND PHYSICAL EXAM  This was created with voice recognition software and transcription errors may be present.     4:02 PM  Date: 5/27/2022  Patient Name: Kimberly Rai    History of Presenting Illness     Chief Complaint:    History Provided By:     HPI: Kimberly Rai is a 45 y.o. male past medical history of hypertension who presents for pneumothorax. Patient had a biopsy yesterday for lung mass and x-ray following that was clear however his PET scan showed a pneumothorax so he was called today and evaluated to come in. Currently asymptomatic. PCP: Suzanne Enriquez MD      Past History     Past Medical History:  Past Medical History:   Diagnosis Date    H/O medication noncompliance     refused to take meds for bp per patient    Hypertension     Ill-defined condition     ruptured windpipe       Past Surgical History:  No past surgical history on file. Family History:  No family history on file. Social History:  Social History     Tobacco Use    Smoking status: Current Every Day Smoker     Packs/day: 0.50    Smokeless tobacco: Never Used   Substance Use Topics    Alcohol use: Yes    Drug use: Yes     Types: Marijuana     Comment: daily       Allergies:  No Known Allergies    Review of Systems     Review of Systems   Constitutional: Negative for activity change. HENT: Negative for congestion. All other systems reviewed and are negative. 10 point review of systems otherwise negative unless noted in HPI. Physical Exam       Physical Exam  Constitutional:       Appearance: He is well-developed. HENT:      Head: Normocephalic and atraumatic. Eyes:      Pupils: Pupils are equal, round, and reactive to light. Cardiovascular:      Rate and Rhythm: Normal rate and regular rhythm. Heart sounds: Normal heart sounds. No murmur heard. No friction rub. Pulmonary:      Effort: Pulmonary effort is normal. No respiratory distress.       Breath sounds: Normal breath sounds. No wheezing. Abdominal:      General: There is no distension. Palpations: Abdomen is soft. Tenderness: There is no abdominal tenderness. There is no guarding or rebound. Musculoskeletal:         General: Normal range of motion. Cervical back: Normal range of motion and neck supple. Skin:     General: Skin is warm and dry. Neurological:      Mental Status: He is alert and oriented to person, place, and time. Psychiatric:         Behavior: Behavior normal.         Thought Content: Thought content normal.         Diagnostic Study Results     Vital Signs  EKG: EKG shows sinus at 65 with a normal axis normal intervals there is no ST elevation or depression no hypertrophy  Labs: CBC unremarkable INR is 1 chemistry unremarkable  Imaging: xray with known pneumothorax    Medical Decision Making     ED Course: Progress Notes, Reevaluation, and Consults:    I will be the provider of record for this patient. Provider Notes (Medical Decision Making): Patient presents for pneumothorax we will send off basic labs notified IR and they are going to come place a small chest tube         Diagnosis     Clinical Impression: No diagnosis found. Disposition:        Patient's Medications   Start Taking    No medications on file   Continue Taking    ALBUTEROL (ACCUNEB) 0.63 MG/3 ML NEBULIZER SOLUTION    0.63 mg by Nebulization route every six (6) hours as needed for Wheezing. ASCORBIC ACID, VITAMIN C, (VITAMIN C) 500 MG TABLET    Take 1,000 mg by mouth. BUPROPION XL (WELLBUTRIN XL) 150 MG TABLET    Take 150 mg by mouth daily. Indications: stop smoking    CHOLECALCIFEROL, VITAMIN D3, 50 MCG (2,000 UNIT) TAB    Take 125 mcg by mouth. CYANOCOBALAMIN (VITAMIN B-12) 500 MCG TABLET    Take 500 mcg by mouth daily.     GABAPENTIN (NEURONTIN) 300 MG CAPSULE    TAKE 1 CAPSULE BY MOUTH THREE TIMES A DAY    HYDROCODONE-ACETAMINOPHEN (NORCO) 5-325 MG PER TABLET    TAKE 1 TO 2 TABLETS BY MOUTH AT BEDTIME    LOSARTAN (COZAAR) 50 MG TABLET    TAKE 1 TABLET BY MOUTH EVERY DAY   These Medications have changed    No medications on file   Stop Taking    No medications on file

## 2022-05-27 NOTE — ED TRIAGE NOTES
Patient had a lung bx yesterday. Today he was told that he has a partial collapsed lung.  He is here per their request for a chest tube placement

## 2022-05-27 NOTE — H&P
Hospitalist Admission History and Physical    NAME:  Quan Pineda   :   1970   MRN:   [id number]     PCP:  Ceci Mckeon MD  Date/Time:  2022 7:02 PM    Subjective:   CHIEF COMPLAINT:  Shortness of breath     HISTORY OF PRESENT ILLNESS:     Kat Santacruz is a 45 y.o.  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 Patient reports he has been having issues with shortness of breath for the last 6 weeks however he noticed after he was discharged from his lung biopsy that his breathing got worse last night. Patient states he would try to take a deep breath and there after developed sharp pain in his chest for which he followed up with a routinely scheduled PET scan and with his oncologist and found to have pneumothorax prompting presentation to ED. Patient has since had chest tube placed to his left side and reports much easier to take a deep breath, denies any chest pain. He reports a mild nonproductive cough. Denies any fevers or chills. Patient states he does continue to approximately 1 pack/day however he recently started on Wellbutrin 3 days ago for smoking cessation. Patient Active Problem List   Diagnosis Code    Pneumothorax of left lung after biopsy J95.811    Pneumothorax J93.9       Past Medical History:   Diagnosis Date    H/O medication noncompliance     refused to take meds for bp per patient    Hypertension     Ill-defined condition     ruptured windpipe    Nodular sclerosis Hodgkin lymphoma of lymph nodes of axilla ([facility] [address]      History reviewed. No pertinent surgical history. Social History     Tobacco Use    Smoking status: Current Every Day Smoker     Packs/day: 1.00    Smokeless tobacco: Never Used   Substance Use Topics    Alcohol use: Yes     Comment: social      History reviewed.  No pertinent family history. No Known Allergies     Prior to Admission Medications   Prescriptions Last Dose Informant Patient Reported? Taking? albuterol (ACCUNEB) 0.63 mg/3 mL nebulizer solution 2022  Yes Yes   Si.63 mg by Nebulization route every six (6) hours as needed for Wheezing. ascorbic acid, vitamin C, (Vitamin C) 500 mg tablet 2022  Yes Yes   Sig: Take 1,000 mg by mouth. buPROPion XL (WELLBUTRIN XL) 150 mg tablet 2022  Yes Yes   Sig: Take 150 mg by mouth daily. Indications: stop smoking   cholecalciferol, vitamin D3, 50 mcg (2,000 unit) tab 2022  Yes Yes   Sig: Take 125 mcg by mouth.   cyanocobalamin (Vitamin B-12) 500 mcg tablet 2022  Yes Yes   Sig: Take 500 mcg by mouth daily. gabapentin (NEURONTIN) 300 mg capsule 2022  Yes Yes   Sig: Take 300 mg by mouth daily. losartan (COZAAR) 50 mg tablet 2022  Yes Yes   Sig: TAKE 1 TABLET BY MOUTH EVERY DAY      Facility-Administered Medications: None     REVIEW OF SYSTEMS:     [] Unable to obtain  ROS due to  []mental status change  []sedated   []intubated   [x]Total of 12 systems reviewed as follows:    GENERAL: no fever or chills   HEENT: no sinus congestion  NECK: No pain or stiffness. PULMONARY: + Mild dry cough, much better ability to take deep breaths since chest tube placement  Cardiovascular: denies palpitations or chest pain; no lower extremity edema  GASTROINTESTINAL: Denies abdominal pain, constipation, diarrhea, nausea, vomiting or melena / bloody stools  GENITOURINARY: No urinary frequency, urgency, hesitancy or dysuria. MUSCULOSKELETAL: no back / joint or muscle pain, no recent trauma. DERMATOLOGIC: denies pruritis, rashes or open areas   ENDOCRINE: No polyuria, polydipsia, no heat or cold intolerance. HEMATOLOGICAL: No easy bruising or bleeding.   No anemia, no evidence of active bleeding  NEUROLOGIC:  no focal weakness,  no speech changes     Objective:   VITALS:    Visit Vitals  BP (!) 144/91   Pulse 64   Temp 98.2 °F (36.8 °C)   Resp 18   Ht 6' (1.829 m)   Wt 127 kg (280 lb)   SpO2 94%   BMI 37.97 kg/m²     Temp (24hrs), Av.3 °F (36.8 °C), Min:98.2 °F (36.8 °C), Max:98.4 °F (36.9 °C)    PHYSICAL EXAM:   General:          Alert, in NAD  HEENT:          Sclera anicteric. Conjunctiva pink. Mucous membranes                          Moist, no ear or nasal discharge  Neck:               Supple. Trachea midline. No accessory muscle use. CV:                  Regular rate and rhythm. S1S2+  Lungs:              Poor air movement to left lower lung field, lungs sounds clear to right side of lung field, chest tube in place to left last  Abdomen:        Soft, non-tender. Not distended. Bowel sounds normal.   Extremities:     No cyanosis. No edema. Pulses 2+ b/l  Neurologic:      Alert and oriented X 4. Follows commands, responds appropriately. No focal neurological deficit was noted  Skin:                Warm and dry. No rashes. LAB DATA REVIEWED:    No components found for: [address] [place] [address]  Recent Labs     22  1600 22  0907    140   K 3.7 3.9    107   CO2 28 28   BUN 9 10   CREA 0.84 0.80   * 105*   CA 9.5 9.3   ALB 3.7  --    WBC 11.0 9.3   HGB 14.1 14.1   HCT 40.2 41.6    376     IMAGING RESULTS:     [x]  I have personally reviewed the actual   [x]CXR  []CT scan    Assessment/Plan:      Active Problems:    Pneumothorax of left lung after biopsy (2022)      Pneumothorax (2022)       ___________________________________________________  PLAN:    1. Left sided pneumothorax -s/p chest tube placement, pulmonary consulted discussed with Dr. Mukul Estrella per IR recommendation. Chemical DVT prophylaxis x 24 hours postprocedure  2. Sleep apnea -hold CPAP in setting of #1, requested nursing place oxygen overnight. Patient and wife voiced some concerns about not having adequate supplies at home / ?   Dirty equipment -request bring machine and respiratory therapy to evaluate  3. Tobacco abuse -continue Wellbutrin (recently started) and nicotine patch  4. Lung mass s/p biopsy -per IR on 05/26/22, pathology pending  5. Remote history of Hodgkin's lymphoma   6.  Cont full code in discussion with patient    Plan for admission and treatment as outlined was discussed with patient and wife Tiki via phone call at phone [phone] called / follow up - Pulmonary     Prophylaxis:  []Lovenox  []Coumadin  []Hep SQ  [x]SCDs  []H2B/PPI    Discussed Code Status:    [x]Full Code      []DNR     ___________________________________________________  Admitting Provider: Simone Barrera NP

## 2022-05-28 ENCOUNTER — APPOINTMENT (OUTPATIENT)
Dept: GENERAL RADIOLOGY | Age: 52
DRG: 201 | End: 2022-05-28
Attending: PHYSICIAN ASSISTANT
Payer: COMMERCIAL

## 2022-05-28 LAB
ANION GAP SERPL CALC-SCNC: 4 MMOL/L (ref 3–18)
ATRIAL RATE: 65 BPM
BASOPHILS # BLD: 0 K/UL (ref 0–0.1)
BASOPHILS NFR BLD: 0 % (ref 0–2)
BUN SERPL-MCNC: 9 MG/DL (ref 7–18)
BUN/CREAT SERPL: 11 (ref 12–20)
CALCIUM SERPL-MCNC: 9.6 MG/DL (ref 8.5–10.1)
CALCULATED P AXIS, ECG09: 58 DEGREES
CALCULATED R AXIS, ECG10: 74 DEGREES
CALCULATED T AXIS, ECG11: 66 DEGREES
CHLORIDE SERPL-SCNC: 104 MMOL/L (ref 100–111)
CO2 SERPL-SCNC: 29 MMOL/L (ref 21–32)
CREAT SERPL-MCNC: 0.82 MG/DL (ref 0.6–1.3)
DIAGNOSIS, 93000: NORMAL
DIFFERENTIAL METHOD BLD: ABNORMAL
EOSINOPHIL # BLD: 0.2 K/UL (ref 0–0.4)
EOSINOPHIL NFR BLD: 2 % (ref 0–5)
ERYTHROCYTE [DISTWIDTH] IN BLOOD BY AUTOMATED COUNT: 12.6 % (ref 11.6–14.5)
GLUCOSE SERPL-MCNC: 104 MG/DL (ref 74–99)
HCT VFR BLD AUTO: 42.1 % (ref 36–48)
HGB BLD-MCNC: 14.1 G/DL (ref 13–16)
IMM GRANULOCYTES # BLD AUTO: 0 K/UL (ref 0–0.04)
IMM GRANULOCYTES NFR BLD AUTO: 0 % (ref 0–0.5)
LYMPHOCYTES # BLD: 1.5 K/UL (ref 0.9–3.6)
LYMPHOCYTES NFR BLD: 16 % (ref 21–52)
MCH RBC QN AUTO: 28.5 PG (ref 24–34)
MCHC RBC AUTO-ENTMCNC: 33.5 G/DL (ref 31–37)
MCV RBC AUTO: 85.1 FL (ref 78–100)
MONOCYTES # BLD: 0.6 K/UL (ref 0.05–1.2)
MONOCYTES NFR BLD: 6 % (ref 3–10)
NEUTS SEG # BLD: 7.1 K/UL (ref 1.8–8)
NEUTS SEG NFR BLD: 76 % (ref 40–73)
NRBC # BLD: 0 K/UL (ref 0–0.01)
NRBC BLD-RTO: 0 PER 100 WBC
P-R INTERVAL, ECG05: 176 MS
PLATELET # BLD AUTO: 337 K/UL (ref 135–420)
PMV BLD AUTO: 10 FL (ref 9.2–11.8)
POTASSIUM SERPL-SCNC: 4 MMOL/L (ref 3.5–5.5)
Q-T INTERVAL, ECG07: 378 MS
QRS DURATION, ECG06: 96 MS
QTC CALCULATION (BEZET), ECG08: 393 MS
RBC # BLD AUTO: 4.95 M/UL (ref 4.35–5.65)
SODIUM SERPL-SCNC: 137 MMOL/L (ref 136–145)
VENTRICULAR RATE, ECG03: 65 BPM
WBC # BLD AUTO: 9.4 K/UL (ref 4.6–13.2)

## 2022-05-28 PROCEDURE — 74011250636 HC RX REV CODE- 250/636: Performed by: NURSE PRACTITIONER

## 2022-05-28 PROCEDURE — 2709999900 HC NON-CHARGEABLE SUPPLY

## 2022-05-28 PROCEDURE — 94760 N-INVAS EAR/PLS OXIMETRY 1: CPT

## 2022-05-28 PROCEDURE — 80048 BASIC METABOLIC PNL TOTAL CA: CPT

## 2022-05-28 PROCEDURE — 94640 AIRWAY INHALATION TREATMENT: CPT

## 2022-05-28 PROCEDURE — 36415 COLL VENOUS BLD VENIPUNCTURE: CPT

## 2022-05-28 PROCEDURE — 77010033678 HC OXYGEN DAILY

## 2022-05-28 PROCEDURE — 74011000250 HC RX REV CODE- 250: Performed by: INTERNAL MEDICINE

## 2022-05-28 PROCEDURE — 99233 SBSQ HOSP IP/OBS HIGH 50: CPT | Performed by: HOSPITALIST

## 2022-05-28 PROCEDURE — 85025 COMPLETE CBC W/AUTO DIFF WBC: CPT

## 2022-05-28 PROCEDURE — 74011250636 HC RX REV CODE- 250/636: Performed by: INTERNAL MEDICINE

## 2022-05-28 PROCEDURE — 74011250637 HC RX REV CODE- 250/637: Performed by: NURSE PRACTITIONER

## 2022-05-28 PROCEDURE — 71045 X-RAY EXAM CHEST 1 VIEW: CPT

## 2022-05-28 PROCEDURE — 94762 N-INVAS EAR/PLS OXIMTRY CONT: CPT

## 2022-05-28 PROCEDURE — 99291 CRITICAL CARE FIRST HOUR: CPT | Performed by: INTERNAL MEDICINE

## 2022-05-28 PROCEDURE — 77030027138 HC INCENT SPIROMETER -A

## 2022-05-28 PROCEDURE — 74011000250 HC RX REV CODE- 250: Performed by: NURSE PRACTITIONER

## 2022-05-28 PROCEDURE — 65660000004 HC RM CVT STEPDOWN

## 2022-05-28 PROCEDURE — 74011250637 HC RX REV CODE- 250/637: Performed by: PHYSICIAN ASSISTANT

## 2022-05-28 RX ORDER — HEPARIN SODIUM 5000 [USP'U]/ML
5000 INJECTION, SOLUTION INTRAVENOUS; SUBCUTANEOUS EVERY 12 HOURS
Status: DISCONTINUED | OUTPATIENT
Start: 2022-05-28 | End: 2022-06-01 | Stop reason: HOSPADM

## 2022-05-28 RX ORDER — IPRATROPIUM BROMIDE AND ALBUTEROL SULFATE 2.5; .5 MG/3ML; MG/3ML
3 SOLUTION RESPIRATORY (INHALATION)
Status: DISCONTINUED | OUTPATIENT
Start: 2022-05-28 | End: 2022-05-30

## 2022-05-28 RX ADMIN — HEPARIN SODIUM 5000 UNITS: 5000 INJECTION INTRAVENOUS; SUBCUTANEOUS at 12:03

## 2022-05-28 RX ADMIN — SODIUM CHLORIDE, PRESERVATIVE FREE 10 ML: 5 INJECTION INTRAVENOUS at 22:24

## 2022-05-28 RX ADMIN — IPRATROPIUM BROMIDE AND ALBUTEROL SULFATE 3 ML: .5; 2.5 SOLUTION RESPIRATORY (INHALATION) at 19:27

## 2022-05-28 RX ADMIN — MORPHINE SULFATE 2 MG: 2 INJECTION, SOLUTION INTRAMUSCULAR; INTRAVENOUS at 04:43

## 2022-05-28 RX ADMIN — ACETAMINOPHEN 650 MG: 325 TABLET ORAL at 16:09

## 2022-05-28 RX ADMIN — MORPHINE SULFATE 2 MG: 2 INJECTION, SOLUTION INTRAMUSCULAR; INTRAVENOUS at 08:40

## 2022-05-28 RX ADMIN — MORPHINE SULFATE 2 MG: 2 INJECTION, SOLUTION INTRAMUSCULAR; INTRAVENOUS at 13:47

## 2022-05-28 RX ADMIN — ACETAMINOPHEN 650 MG: 325 TABLET ORAL at 22:22

## 2022-05-28 RX ADMIN — SODIUM CHLORIDE, PRESERVATIVE FREE 10 ML: 5 INJECTION INTRAVENOUS at 05:35

## 2022-05-28 RX ADMIN — IPRATROPIUM BROMIDE AND ALBUTEROL SULFATE 3 ML: .5; 2.5 SOLUTION RESPIRATORY (INHALATION) at 14:55

## 2022-05-28 RX ADMIN — ACETAMINOPHEN 650 MG: 325 TABLET ORAL at 11:58

## 2022-05-28 RX ADMIN — LOSARTAN POTASSIUM 50 MG: 50 TABLET, FILM COATED ORAL at 08:50

## 2022-05-28 RX ADMIN — GABAPENTIN 300 MG: 300 CAPSULE ORAL at 08:50

## 2022-05-28 RX ADMIN — BUPROPION HYDROCHLORIDE 150 MG: 150 TABLET, FILM COATED, EXTENDED RELEASE ORAL at 08:50

## 2022-05-28 RX ADMIN — SODIUM CHLORIDE, PRESERVATIVE FREE 10 ML: 5 INJECTION INTRAVENOUS at 13:48

## 2022-05-28 NOTE — PROGRESS NOTES
conducted an initial consultation and Spiritual Assessment for Yana Wharton, who is a 45 y. o.,male. Patient's Primary Language is: [place]. According to the patient's EMR Amish Affiliation is: No Judaism. The reason the Patient came to the hospital is:   Patient Active Problem List    Diagnosis Date Noted    Pneumothorax of left lung after biopsy 05/27/2022    Pneumothorax 05/27/2022    Essential hypertension 05/27/2022    Morbid obesity ([facility] [address] 05/27/2022    Cervical radiculopathy 05/27/2022    Tobacco dependence syndrome 04/20/2017    [facility] 04/19/2016        The  provided the following Interventions:  Initiated a relationship of care and support. Explored issues of rebeka, belief, spirituality and Taoism/ritual needs while hospitalized. Listened empathically as he shared concerning current pain and concerns related to his biopsy. Offered prayer surrounding his concerns and assurance of  prayer for his wife whom he shared had injured herself prior to his admission. Chart reviewed. The following outcomes where achieved:  Patient shared limited information about both their medical narrative and spiritual journey/beliefs.  confirmed Patient's Amish Affiliation: Trevor Palmer  Patient processed feeling about current hospitalization. Patient expressed gratitude for 's visit. Assessment:  Patient is expressing some concern about the cause of his recent sickness, relaying he had non-hodgkin's lymphoma earlier in life. He did not any Taoism/cultural needs that will affect his preferences in health care. There are no spiritual or Taoism issues which require intervention at this time. Plan:  Chaplains will continue to follow and will provide pastoral care on an as needed/requested basis.  recommends bedside caregivers page  on duty if patient shows signs of acute spiritual or emotional distress.      [address] [facility] Megan   [phone]

## 2022-05-28 NOTE — PROGRESS NOTES
Hospitalist Progress Note    Patient: Liliana Lundberg MRN: [id number]  CSN: [id number]    YOB: 1970  Age: 45 y.o. Sex: male    DOA: 5/27/2022 LOS:  LOS: 1 day          Scheduled duoneb started per pulmonology, and nicoderm. Plan to keep chest tube on suction today. Patient seen and examined, he reports pain is tolerable. No chest pain. Still short of breath, unchanged. Tolerating chest tube. Denies abd pain n/v/d/c, denies urinary sx. States he will quit smoking. His wife does not smoke. Has folliculitis to right groin which is chronic and drains intermittently, and a pilonidal cyst to sacrum for which he declines intervention. Assessment/Plan     1. Left PTX s/p biopsy. keep chest tube on suction today. No air leak noted. CXR tomorrow am and if unchanged will place chest tube on water seal. Pulmonology input appreciated. 2. Lung mass - bx pending  3. 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 s/p 6 cycles of ABVD chemotherapy, last treatment was on 1/2/2008. S.p Right axillary XRT. PET scan 5/13/22 noted. Follow bx. Primary oncologist Dr. Guilherme Boykin. NP [facility] consulted. 4. megan  5. Hypertension on cozaar. 6. Tobacco use d/o. Patch. Smoking cessation education. 7. Possible COPD  8. Folliculitis, no active drainage.e  9. Pilonidal cyst. Patient declines intervention. 10. Recommend COVID booster - only has received J & J x 1 about 10-11 months ago. 11. dvt prophylaxis  12. Full code. cvt monitoring.       Additional Notes:      Case discussed with:  [x]Patient  []Family  [x]Nursing  []Case Management  DVT Prophylaxis:  []Lovenox  []Hep SQ  []SCDs  []Coumadin   []On Heparin gtt    Vital signs/Intake and Output:  Visit Vitals  /84   Pulse 64   Temp 97.7 °F (36.5 °C)   Resp 20   Ht 6' (1.829 m)   Wt 127 kg (280 lb)   SpO2 96%   BMI 37.97 kg/m²     Current Shift:  05/28 0701 - 05/28 1900  In: 1080 [P.O.:1080]  Out: 1000 [Urine:1000]  Last three shifts: 05/26 1901 - 05/28 0700  In: 240 [P.O.:240]  Out: 1030 [Urine:1000]    Awake alert and oriented x4  Ncat. Perrl. Oropharynx clear  RRR  CT to left chest dsg c/d/i. cta b.l  abd soft nt nd nabs  No focal deficit  Small area of induration to right groin, no drainage; no surrounding warmth, erythema, no significant ttp. Pilonidal cyst to sacrum, no drainage      Medications Reviewed      Labs: Results:       Chemistry Recent Labs     05/28/22  0554 05/27/22 1600 05/26/22  0907   * 103* 105*    137 140   K 4.0 3.7 3.9    106 107   CO2 29 28 28   BUN 9 9 10   CREA 0.82 0.84 0.80   CA 9.6 9.5 9.3   AGAP 4 3 5   BUCR 11* 11* 13   AP  --  66  --    TP  --  7.5  --    ALB  --  3.7  --    GLOB  --  3.8  --    AGRAT  --  1.0  --       CBC w/Diff Recent Labs     05/28/22 0554 05/27/22 1600 05/26/22  0907   WBC 9.4 11.0 9.3   RBC 4.95 4.79 4.93   HGB 14.1 14.1 14.1   HCT 42.1 40.2 41.6    369 376   GRANS 76* 75*  --    LYMPH 16* 16*  --    EOS 2 2  --       Cardiac Enzymes No results for input(s): CPK, CKND1, ANGEL in the last 72 hours. No lab exists for component: CKRMB, TROIP   Coagulation Recent Labs     05/27/22 1600 05/26/22  0907   PTP 13.9 14.1   INR 1.0 1.1   APTT  --  34.9       Lipid Panel Lab Results   Component Value Date/Time    Cholesterol, total 363 (H) 04/23/2010 07:25 AM    HDL Cholesterol 42 04/23/2010 07:25 AM    LDL, calculated 279 (H) 04/23/2010 07:25 AM    VLDL, calculated 42 04/23/2010 07:25 AM    Triglyceride 210 (H) 04/23/2010 07:25 AM    CHOL/HDL Ratio 8.6 (H) 04/23/2010 07:25 AM      BNP No results for input(s): BNPP in the last 72 hours.    Liver Enzymes Recent Labs     05/27/22  1600   TP 7.5   ALB 3.7   AP 66      Thyroid Studies Lab Results   Component Value Date/Time    T4, Total 8.4 10/15/2013 04:15 PM    TSH 0.51 04/22/2010 08:40 PM        Procedures/imaging: see electronic medical records for all procedures/Xrays and details which were not copied into this note but were reviewed prior to creation of Plan

## 2022-05-28 NOTE — ROUTINE PROCESS
7212 Received report from [facility] and [employer]. Patient alert and oriented. Chest tube intact. 0900 Patient's chest was knocked over when the patient get out of the bed. Patient denies any shortness of breath - just pain on insertion site. Pain med was given. 1347 Pain med given as requested, denies any shortness of breath. Call bell and telephone and urinal placed within reach. 1700 Relatives at bedside. 1800 Patient complaining room too hot - Patient moved to another room. Family still at bedside. 1920 Bedside and Verbal shift change report given to Oj Ayala (oncoming nurse). Report included the following information SBAR, Kardex, Intake/Output, MAR, Recent Results and Cardiac Rhythm NSR.

## 2022-05-28 NOTE — CONSULTS
[facility]  Pulmonary, Critical Care, and Sleep Medicine    Name: Shoshana Peter MRN: [id number]   : 1970 Hospital: [address] [place] [address] Dr   Date: 2022        Pulmonary Initial In-Patient Consult                                              Consult requesting physician: Dr. Sharan Goldsmith  Reason for Consult: Left sided pneumothorax s/p chest tube placement. IMPRESSION:   · Left sided PTX s/p biopsy now fully reexpanded with chest tube in place with suction. · Lung mass - biopsy pending  · History or Hodgkins Lymphoma - followed by oncology  · HTN  · VALENTINA  · Tobacco abuse  · Possible COPD      RECOMMENDATIONS:   · Plan to keep chest tube on suction today. No air leak noted. CXR tomorrow am and if unchanged will place chest tube on water seal.  · Start scheduled duoneb  · Hold on PAP therapy for now until pneumothorax completely resolved. · Will need full pulmonary work-up as outpatient to include full PFTs and sleep study/PAP titration  · Recommend COVID booster - only has received J & J x 1 about 10-11 months ago. · Nutrition: per primary team  · Replace electrolytes  · HOB >=30 degree, aggressive pulmonary toileting, incentive spirometry, PT/OT eval and treat  · GI Prophylaxis: not indicated  · DVT Prophylaxis: heparin  · Smoking cessation counseling done by me and spent >11 minutes on it. Started on nicotine patch and wellbutrin  · Further recommendations will be based on the patient's response to recommended treatment and results of the investigation ordered. Subjective/History:     Shoshana Peter is a 45 y.o. male with PMHx significant for Hodgkins lymphoma with recent findings of lung mass who underwent biopsy of left lower lung mass on 2022. Returned to the ED yesterday with acute onset of chest pain and increased dyspnea and noted to have a PTX.   Chest tube placed by IR with complete re-expansion of lung and pulmonary consulted to assist with chest tube management over the weekend. Patient also has history of severe VALENTINA that was diagnosed over 10 years ago and was on BIPAP but DME stopped his therapy so he is now using an old CPAP. Has chronic dyspnea for last 4-6 months with chest tightness as well - no previous pulmonary work-up. Current smoker of at least 1 ppd for 35 + years but ready to stop now. Review of Systems:  Review of systems not obtained due to patient factors. 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 No pertinent surgical history. History reviewed. No pertinent family history. Social History     Tobacco Use    Smoking status: Current Every Day Smoker     Packs/day: 1.00    Smokeless tobacco: Never Used   Substance Use Topics    Alcohol use: Yes     Comment: social          Prior to Admission medications    Medication Sig Start Date End Date Taking? Authorizing Provider   buPROPion XL (WELLBUTRIN XL) 150 mg tablet Take 150 mg by mouth daily. Indications: stop smoking   Yes Provider, Historical   cyanocobalamin (Vitamin B-12) 500 mcg tablet Take 500 mcg by mouth daily. Yes Provider, Historical   cholecalciferol, vitamin D3, 50 mcg (2,000 unit) tab Take 125 mcg by mouth. Yes Provider, Historical   ascorbic acid, vitamin C, (Vitamin C) 500 mg tablet Take 1,000 mg by mouth. Yes Provider, Historical   albuterol (ACCUNEB) 0.63 mg/3 mL nebulizer solution 0.63 mg by Nebulization route every six (6) hours as needed for Wheezing. Yes Provider, Historical   losartan (COZAAR) 50 mg tablet TAKE 1 TABLET BY MOUTH EVERY DAY 4/12/21  Yes Provider, Historical   gabapentin (NEURONTIN) 300 mg capsule Take 300 mg by mouth daily.  6/7/21  Yes Provider, Historical       Current Facility-Administered Medications   Medication Dose Route Frequency    albuterol-ipratropium (DUO-NEB) 2.5 MG-0.5 MG/3 ML  3 mL Nebulization Q6H RT    acetaminophen (TYLENOL) tablet 650 mg  650 mg Oral Q4H PRN    sodium chloride (NS) flush 5-40 mL  5-40 mL IntraVENous Q8H    sodium chloride (NS) flush 5-40 mL  5-40 mL IntraVENous PRN    polyethylene glycol (MIRALAX) packet 17 g  17 g Oral DAILY PRN    ondansetron (ZOFRAN ODT) tablet 4 mg  4 mg Oral Q8H PRN    Or    ondansetron (ZOFRAN) injection 4 mg  4 mg IntraVENous Q6H PRN    nicotine (NICODERM CQ) 21 mg/24 hr patch 1 Patch  1 Patch TransDERmal DAILY    albuterol (PROVENTIL VENTOLIN) nebulizer solution 2.5 mg  2.5 mg Nebulization Q6H PRN    buPROPion XL (WELLBUTRIN XL) tablet 150 mg  150 mg Oral DAILY    losartan (COZAAR) tablet 50 mg  50 mg Oral DAILY    gabapentin (NEURONTIN) capsule 300 mg  300 mg Oral DAILY    lidocaine 4 % patch 1 Patch  1 Patch TransDERmal Q24H    morphine injection 2 mg  2 mg IntraVENous Q4H PRN         Objective:   Vital Signs:    Visit Vitals  /84   Pulse 64   Temp 97.7 °F (36.5 °C)   Resp 20   Ht 6' (1.829 m)   Wt 127 kg (280 lb)   SpO2 96%   BMI 37.97 kg/m²       O2 Device: Nasal cannula   O2 Flow Rate (L/min): 2 l/min   Temp (24hrs), Av.1 °F (36.7 °C), Min:97.6 °F (36.4 °C), Max:98.7 °F (37.1 °C)       Intake/Output:   Last shift:       07 -  1900  In: 360 [P.O.:360]  Out: -   Last 3 shifts: 1901 -  0700  In: 240 [P.O.:240]  Out: 1030 [Urine:1000]    Intake/Output Summary (Last 24 hours) at 2022 1103  Last data filed at 2022 0850  Gross per 24 hour   Intake 600 ml   Output 1030 ml   Net -430 ml       Physical Exam:     General:  Alert, Awake, NAD, cooperative, no distress, appears stated age. Head:   Normocephalic, without obvious abnormality, atraumatic. Eye:   Conjunctivae/corneas clear.  PERRLA, no scleral icterus, no pallor, no cyanosis  Nose:   Nares normal. Septum midline. Mucosa normal without erythema/exudate. No drainage/discharge. No sinus tenderness. Throat:  MP 2-3 with lateral airway narrowing  Neck:   Supple, symmetric, thyroid: no enlargement/tenderness/nodule, no JVD, no carotid bruit, no lymphadenopathy. Trachea midline  Back & spine: Symmetric, no curvature. Chest wall: Chest tube in place, no swelling or pain to palpation  Lung:   Clear to auscultation, no decreased breath sounds or wheezing  Heart:   Regular rate & rhythm. S1 S2 present, no murmur, no gallop, no click, no rub  Abdomen:  Soft, NT, ND, +BS, no masses, no organomegaly  Extremities:  No pedal edema, no cyanosis, no clubbing  Pulses: 2+ and symmetric in DP  Lymphatic:  No cervical, supraclavicular and axillary palpable lymphadenopathy. Musculoskeletal: No joint swelling or tenderness. Neurologic:  Grossly non focal.        Data:         Chemistry Recent Labs     05/28/22  0554 05/27/22  1600 05/26/22  0907   * 103* 105*    137 140   K 4.0 3.7 3.9    106 107   CO2 29 28 28   BUN 9 9 10   CREA 0.82 0.84 0.80   CA 9.6 9.5 9.3   AGAP 4 3 5   BUCR 11* 11* 13   AP  --  66  --    TP  --  7.5  --    ALB  --  3.7  --    GLOB  --  3.8  --    AGRAT  --  1.0  --         Lactic Acid No results found for: LAC  No results for input(s): LAC in the last 72 hours. Liver Enzymes Protein, total   Date Value Ref Range Status   05/27/2022 7.5 6.4 - 8.2 g/dL Final     Albumin   Date Value Ref Range Status   05/27/2022 3.7 3.4 - 5.0 g/dL Final     Globulin   Date Value Ref Range Status   05/27/2022 3.8 2.0 - 4.0 g/dL Final     A-G Ratio   Date Value Ref Range Status   05/27/2022 1.0 0.8 - 1.7   Final     Alk.  phosphatase   Date Value Ref Range Status   05/27/2022 66 45 - 117 U/L Final     Recent Labs     05/27/22  1600   TP 7.5   ALB 3.7   GLOB 3.8   AGRAT 1.0   AP 66        CBC w/Diff Recent Labs     05/28/22  0554 05/27/22  1600 05/26/22  0907   WBC 9.4 11.0 9.3   RBC 4.95 4.79 4.93   HGB 14.1 14.1 14.1   HCT 42.1 40.2 41.6    369 376   GRANS 76* 75*  --    LYMPH 16* 16*  --    EOS 2 2  --         Cardiac Enzymes No results found for: CPK, CK, CKMMB, CKMB, RCK3, CKMBT, CKNDX, CKND1, ANGEL, TROPT, TROIQ, KATY, TROPT, TNIPOC, BNP, BNPP     BNP No results found for: BNP, BNPP, XBNPT     Coagulation Recent Labs     05/27/22  1600 05/26/22  0907   PTP 13.9 14.1   INR 1.0 1.1   APTT  --  34.9         Thyroid  Lab Results   Component Value Date/Time    T4, Total 8.4 10/15/2013 04:15 PM    TSH 0.51 04/22/2010 08:40 PM          Lipid Panel Lab Results   Component Value Date/Time    Cholesterol, total 363 (H) 04/23/2010 07:25 AM    HDL Cholesterol 42 04/23/2010 07:25 AM    LDL, calculated 279 (H) 04/23/2010 07:25 AM    VLDL, calculated 42 04/23/2010 07:25 AM    Triglyceride 210 (H) 04/23/2010 07:25 AM    CHOL/HDL Ratio 8.6 (H) 04/23/2010 07:25 AM        ABG No results for input(s): PHI, PHI, POC2, PCO2I, PO2, PO2I, HCO3, HCO3I, FIO2, FIO2I in the last 72 hours. Urinalysis Lab Results   Component Value Date/Time    Color YELLOW 10/15/2013 04:15 PM    Appearance CLEAR 10/15/2013 04:15 PM    Specific gravity 1.010 10/15/2013 04:15 PM    pH (UA) 7.0 10/15/2013 04:15 PM    Protein NEGATIVE 10/15/2013 04:15 PM    Glucose NEGATIVE 10/15/2013 04:15 PM    Ketone NEGATIVE 10/15/2013 04:15 PM    Bilirubin NEGATIVE 10/15/2013 04:15 PM    Urobilinogen 0.2 10/15/2013 04:15 PM    Nitrites NEGATIVE 10/15/2013 04:15 PM    Leukocyte Esterase NEGATIVE 10/15/2013 04:15 PM        Micro  No results for input(s): SDES, CULT in the last 72 hours. No results for input(s): CULT in the last 72 hours. XR (Most Recent). CXR reviewed by me and compared with previous CXR Results from Hospital Encounter encounter on 05/27/22    XR CHEST PORT    Narrative  CHEST PORTABLE      COMPARISON: May 27. INDICATIONS: Left chest tube in place.     FINDINGS:    Portable Single View Chest Demonstrates:    Focal right midlung opacity is unchanged. Strands of subsegmental atelectasis  are present at the left base. The left lower lobe mass is not well demonstrated  on plain film. The cardiomediastinal silhouette is normal in size for technique. Vasculature is unremarkable. No pneumothorax or pleural fluid is identified. Left chest tube remains in place  The bones and soft tissues are unremarkable. The lung volumes are within the range of normal.    Impression  Radiographically stable and unchanged cardiopulmonary status with left chest  tube in place. CT (Most Recent) Results from Hospital Encounter encounter on 05/27/22    CT THORACENTESIS INSRT CHEST TUBE    Narrative  PREOPERATIVE DIAGNOSIS: Large left pneumothorax. POSTOPERATIVE DIAGNOSIS: Same    INDICATION: 45years old male status post left lung biopsy with latent  development of large left asymptomatic pneumothorax. Patient is here for  CT-guided chest tube placement. ATTENDING: Dr. Braulio Gutierrez M.D.    Ciara Thapa: None. PROCEDURES: CT-guided left chest tube placement. ANESTHESIA: Local 1% lidocaine as well as moderate intravenous sedation with  fentanyl given and monitored per independently trained interventional radiology  nurse under my direct supervision for 35 minutes. Please see detailed nursing  record for medication dosing. CONTRAST: None. COMPLICATIONS: None    DRAIN: 24 [place] Thal-Quick left chest tube. CATHETER: None. EBL: Minimal.    SPECIMEN: None. TECHNIQUE:    All CT scans at this facility are performed using dose optimization technique as  appropriate to a performed exam, to include automated exposure control,  adjustment of the mA and/or kV according to patient size (including appropriate  matching for site-specific examinations), or use of iterative reconstruction  technique. The risks, benefits, and alternatives were discussed. Written and verbal consent  obtained.  Patient was taken to the CT room and placed supine on the CT table.   view was obtained. Left lower anterior hemithorax was prepped and draped  in the usual sterile fashion. Maximum sterile barrier technique was used. 1%  local lidocaine was infused and a scan and deep subcutaneous tissues overlying  the entry site. Under direct CT fluoroscopic guidance with an assistance of  18-gauge trocar needle a J-wire left lower chest was cannulated without  difficulty. Left anterior approach. Serial dilatations were performed. 25 Romansh  chest tube was advanced over wire and positioned in the left superior and  anterior pleural space. Tube was attached to Atrium to wall suction. External  tubing was affixed to the skin with 0 Prolene. Sterile dressing was applied. There were no immediate complications. Patient tolerated procedure fairly well. Patient was transferred to recovery in stable condition. Dr. Jarret Holley was  present and performed the procedure. FINDINGS: Initial imaging of the thorax demonstrated complex left pleural  effusion without pneumothorax. CT fluoroscopic guidance demonstrated good  position of the access needle as well as a new 24 Romansh Thal-Quick chest tube. Impression  1. Successful uncomplicated CT-guided left chest tube placement. EKG No results found for this or any previous visit. ECHO No results found for this or any previous visit. PFT No flowsheet data found.      Other ASA reactivity:   Pre-albumin:   Ionized Calcium:   NH4:   T3, FT4:  Cortisol:  Urine Osm:  Urine Lytes:   HbA1c:      Recent Results (from the past 24 hour(s))   CBC WITH AUTOMATED DIFF    Collection Time: 05/27/22  4:00 PM   Result Value Ref Range    WBC 11.0 4.6 - 13.2 K/uL    RBC 4.79 4.35 - 5.65 M/uL    HGB 14.1 13.0 - 16.0 g/dL    HCT 40.2 36.0 - 48.0 %    MCV 83.9 78.0 - 100.0 FL    MCH 29.4 24.0 - 34.0 PG    MCHC 35.1 31.0 - 37.0 g/dL    RDW 12.5 11.6 - 14.5 %    PLATELET 311 140 - 682 K/uL    MPV 9.7 9.2 - 11.8 FL    NRBC 0.0 0  WBC    ABSOLUTE NRBC 0. 00 0.00 - 0.01 K/uL    NEUTROPHILS 75 (H) 40 - 73 %    LYMPHOCYTES 16 (L) 21 - 52 %    MONOCYTES 6 3 - 10 %    EOSINOPHILS 2 0 - 5 %    BASOPHILS 1 0 - 2 %    IMMATURE GRANULOCYTES 0 0.0 - 0.5 %    ABS. NEUTROPHILS 8.2 (H) 1.8 - 8.0 K/UL    ABS. LYMPHOCYTES 1.8 0.9 - 3.6 K/UL    ABS. MONOCYTES 0.7 0.05 - 1.2 K/UL    ABS. EOSINOPHILS 0.2 0.0 - 0.4 K/UL    ABS. BASOPHILS 0.1 0.0 - 0.1 K/UL    ABS. IMM. GRANS. 0.0 0.00 - 0.04 K/UL    DF AUTOMATED     METABOLIC PANEL, COMPREHENSIVE    Collection Time: 05/27/22  4:00 PM   Result Value Ref Range    Sodium 137 136 - 145 mmol/L    Potassium 3.7 3.5 - 5.5 mmol/L    Chloride 106 100 - 111 mmol/L    CO2 28 21 - 32 mmol/L    Anion gap 3 3.0 - 18 mmol/L    Glucose 103 (H) 74 - 99 mg/dL    BUN 9 7.0 - 18 MG/DL    Creatinine 0.84 0.6 - 1.3 MG/DL    BUN/Creatinine ratio 11 (L) 12 - 20      GFR est AA >60 >60 ml/min/1.73m2    GFR est non-AA >60 >60 ml/min/1.73m2    Calcium 9.5 8.5 - 10.1 MG/DL    Bilirubin, total 0.6 0.2 - 1.0 MG/DL    ALT (SGPT) 31 16 - 61 U/L    AST (SGOT) 16 10 - 38 U/L    Alk.  phosphatase 66 45 - 117 U/L    Protein, total 7.5 6.4 - 8.2 g/dL    Albumin 3.7 3.4 - 5.0 g/dL    Globulin 3.8 2.0 - 4.0 g/dL    A-G Ratio 1.0 0.8 - 1.7     PROTHROMBIN TIME + INR    Collection Time: 05/27/22  4:00 PM   Result Value Ref Range    Prothrombin time 13.9 11.5 - 15.2 sec    INR 1.0 0.8 - 1.2     EKG, 12 LEAD, INITIAL    Collection Time: 05/27/22  4:11 PM   Result Value Ref Range    Ventricular Rate 65 BPM    Atrial Rate 65 BPM    P-R Interval 176 ms    QRS Duration 96 ms    Q-T Interval 378 ms    QTC Calculation (Bezet) 393 ms    Calculated P Axis 58 degrees    Calculated R Axis 74 degrees    Calculated T Axis 66 degrees    Diagnosis       Normal sinus rhythm  Normal ECG  When compared with ECG of 24-JAN-2018 14:37,  No significant change was found  Confirmed by Marti Rivera ([id number]) on 5/28/2022 60:11:84 AM     METABOLIC PANEL, BASIC    Collection Time: 05/28/22  5:54 AM Result Value Ref Range    Sodium 137 136 - 145 mmol/L    Potassium 4.0 3.5 - 5.5 mmol/L    Chloride 104 100 - 111 mmol/L    CO2 29 21 - 32 mmol/L    Anion gap 4 3.0 - 18 mmol/L    Glucose 104 (H) 74 - 99 mg/dL    BUN 9 7.0 - 18 MG/DL    Creatinine 0.82 0.6 - 1.3 MG/DL    BUN/Creatinine ratio 11 (L) 12 - 20      GFR est AA >60 >60 ml/min/1.73m2    GFR est non-AA >60 >60 ml/min/1.73m2    Calcium 9.6 8.5 - 10.1 MG/DL   CBC WITH AUTOMATED DIFF    Collection Time: 05/28/22  5:54 AM   Result Value Ref Range    WBC 9.4 4.6 - 13.2 K/uL    RBC 4.95 4.35 - 5.65 M/uL    HGB 14.1 13.0 - 16.0 g/dL    HCT 42.1 36.0 - 48.0 %    MCV 85.1 78.0 - 100.0 FL    MCH 28.5 24.0 - 34.0 PG    MCHC 33.5 31.0 - 37.0 g/dL    RDW 12.6 11.6 - 14.5 %    PLATELET 364 053 - 506 K/uL    MPV 10.0 9.2 - 11.8 FL    NRBC 0.0 0  WBC    ABSOLUTE NRBC 0.00 0.00 - 0.01 K/uL    NEUTROPHILS 76 (H) 40 - 73 %    LYMPHOCYTES 16 (L) 21 - 52 %    MONOCYTES 6 3 - 10 %    EOSINOPHILS 2 0 - 5 %    BASOPHILS 0 0 - 2 %    IMMATURE GRANULOCYTES 0 0.0 - 0.5 %    ABS. NEUTROPHILS 7.1 1.8 - 8.0 K/UL    ABS. LYMPHOCYTES 1.5 0.9 - 3.6 K/UL    ABS. MONOCYTES 0.6 0.05 - 1.2 K/UL    ABS. EOSINOPHILS 0.2 0.0 - 0.4 K/UL    ABS. BASOPHILS 0.0 0.0 - 0.1 K/UL    ABS. IMM. GRANS. 0.0 0.00 - 0.04 K/UL    DF AUTOMATED           Telemetry:normal sinus rhythm    The patient is: [x] acutely ill Risk of deterioration: [x] moderate    [] critically ill  [] high     [x]See my orders for details    My assessment, plan of care, findings, medications, side effects etc were discussed with:  [x] Nurse [] PT/OT    [] Respiratory therapy [x] Dr. Garza [facility]   [] Family: answered all questions to satisfaction [x] Patient: answered all questions to satisfaction   [] Pharmacist []      [x] Total critical care time exclusive of procedures 60 minutes with complex decision making, coordination of care and counseling patient performed and > 50% time spent in face to face evaluation.       Phillip Li Jenniffer Zhang MD  5/28/2022

## 2022-05-29 ENCOUNTER — APPOINTMENT (OUTPATIENT)
Dept: GENERAL RADIOLOGY | Age: 52
DRG: 201 | End: 2022-05-29
Attending: PHYSICIAN ASSISTANT
Payer: COMMERCIAL

## 2022-05-29 PROCEDURE — 74011250637 HC RX REV CODE- 250/637: Performed by: PHYSICIAN ASSISTANT

## 2022-05-29 PROCEDURE — 74011000250 HC RX REV CODE- 250: Performed by: INTERNAL MEDICINE

## 2022-05-29 PROCEDURE — 94760 N-INVAS EAR/PLS OXIMETRY 1: CPT

## 2022-05-29 PROCEDURE — 77010033678 HC OXYGEN DAILY

## 2022-05-29 PROCEDURE — 74011000250 HC RX REV CODE- 250: Performed by: NURSE PRACTITIONER

## 2022-05-29 PROCEDURE — 94640 AIRWAY INHALATION TREATMENT: CPT

## 2022-05-29 PROCEDURE — 71045 X-RAY EXAM CHEST 1 VIEW: CPT

## 2022-05-29 PROCEDURE — 74011250637 HC RX REV CODE- 250/637: Performed by: NURSE PRACTITIONER

## 2022-05-29 PROCEDURE — 94762 N-INVAS EAR/PLS OXIMTRY CONT: CPT

## 2022-05-29 PROCEDURE — 65660000004 HC RM CVT STEPDOWN

## 2022-05-29 PROCEDURE — 74011250636 HC RX REV CODE- 250/636: Performed by: INTERNAL MEDICINE

## 2022-05-29 PROCEDURE — 94761 N-INVAS EAR/PLS OXIMETRY MLT: CPT

## 2022-05-29 PROCEDURE — 99232 SBSQ HOSP IP/OBS MODERATE 35: CPT | Performed by: HOSPITALIST

## 2022-05-29 PROCEDURE — 99221 1ST HOSP IP/OBS SF/LOW 40: CPT | Performed by: INTERNAL MEDICINE

## 2022-05-29 RX ADMIN — SODIUM CHLORIDE, PRESERVATIVE FREE 10 ML: 5 INJECTION INTRAVENOUS at 15:03

## 2022-05-29 RX ADMIN — IPRATROPIUM BROMIDE AND ALBUTEROL SULFATE 3 ML: .5; 2.5 SOLUTION RESPIRATORY (INHALATION) at 14:59

## 2022-05-29 RX ADMIN — LOSARTAN POTASSIUM 50 MG: 50 TABLET, FILM COATED ORAL at 08:43

## 2022-05-29 RX ADMIN — SODIUM CHLORIDE, PRESERVATIVE FREE 10 ML: 5 INJECTION INTRAVENOUS at 22:00

## 2022-05-29 RX ADMIN — ACETAMINOPHEN 650 MG: 325 TABLET ORAL at 08:51

## 2022-05-29 RX ADMIN — ACETAMINOPHEN 650 MG: 325 TABLET ORAL at 12:45

## 2022-05-29 RX ADMIN — BUPROPION HYDROCHLORIDE 150 MG: 150 TABLET, FILM COATED, EXTENDED RELEASE ORAL at 08:43

## 2022-05-29 RX ADMIN — GABAPENTIN 300 MG: 300 CAPSULE ORAL at 08:43

## 2022-05-29 RX ADMIN — HEPARIN SODIUM 5000 UNITS: 5000 INJECTION INTRAVENOUS; SUBCUTANEOUS at 12:41

## 2022-05-29 RX ADMIN — IPRATROPIUM BROMIDE AND ALBUTEROL SULFATE 3 ML: .5; 2.5 SOLUTION RESPIRATORY (INHALATION) at 08:38

## 2022-05-29 RX ADMIN — IPRATROPIUM BROMIDE AND ALBUTEROL SULFATE 3 ML: .5; 2.5 SOLUTION RESPIRATORY (INHALATION) at 20:43

## 2022-05-29 RX ADMIN — IPRATROPIUM BROMIDE AND ALBUTEROL SULFATE 3 ML: .5; 2.5 SOLUTION RESPIRATORY (INHALATION) at 01:15

## 2022-05-29 RX ADMIN — SODIUM CHLORIDE, PRESERVATIVE FREE 10 ML: 5 INJECTION INTRAVENOUS at 06:03

## 2022-05-29 RX ADMIN — HEPARIN SODIUM 5000 UNITS: 5000 INJECTION INTRAVENOUS; SUBCUTANEOUS at 00:04

## 2022-05-29 NOTE — PROGRESS NOTES
0730: Bedside change of shift report given to Mariel Raymond RN (On-coming nurse) by Ghada olivera). Report included the following information SBAR, Kardex, Intake/Output, MAR and Cardiac SR.

## 2022-05-29 NOTE — ROUTINE PROCESS
0930 Patient chest tube was off the suction by Dr Ever Díaz. No air leak  noted. Patient denies any pain at this time. 1500 Patient resting comfortably in the bed. Chest tube intact. 1920 Bedside and Verbal shift change report given to Henri Bruno (oncoming nurse). Report included the following information SBAR, Kardex, Intake/Output, MAR, Recent Results and Cardiac Rhythm NSR.

## 2022-05-29 NOTE — PROGRESS NOTES
Hospitalist Progress Note    Patient: Chiara Bang MRN: [id number]  CSN: [id number]    YOB: 1970  Age: 45 y.o. Sex: male    DOA: 5/27/2022 LOS:  LOS: 2 days          Chest tube continued to water seal today. Patient seen and examined at bedside, he reports that Dr. Alton Hernandez stop by and gave him the reports of the PET scan. He states he feels emotionally drained, but he has been talking on the phone with his wife. She is not able to visit related to a recent knee injury. His son is going to stop by after work this afternoon. Assessment/Plan     1. Left PTX s/p biopsy. keep chest tube on suction today. No air leak noted. Chest tube is on waterseal at this time. Pulmonology input appreciated. 2. Lung mass - bx pending  3. 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 s/p 6 cycles of ABVD chemotherapy, last treatment was on 1/2/2008. S.p Right axillary XRT. PET scan 5/13/22 noted. Follow bx. Primary oncologist Dr. Melina Herr. NP [facility] consulted. 4. megan  5. Hypertension on cozaar. 6. Tobacco use d/o. Patch. Smoking cessation education. 7. Possible COPD  8. Folliculitis, no active drainage. 9. Pilonidal cyst. Patient declines intervention. 10. Recommend COVID booster - only has received J & J x 1 about 10-11 months ago. 11. dvt prophylaxis  12. Full code. cvt monitoring. Additional Notes:      Case discussed with:  [x]Patient  []Family  [x]Nursing  []Case Management  DVT Prophylaxis:  []Lovenox  []Hep SQ  []SCDs  []Coumadin   []On Heparin gtt    Vital signs/Intake and Output:  Visit Vitals  BP (!) 148/88   Pulse 71   Temp 97.5 °F (36.4 °C)   Resp 20   Ht 6' (1.829 m)   Wt 127 kg (280 lb)   SpO2 94%   BMI 37.97 kg/m²     Current Shift:  05/29 0701 - 05/29 1900  In: 600 [P.O.:600]  Out: 1115 [Urine:1100]  Last three shifts:  05/27 1901 - 05/29 0700  In: 1560 [P.O.:1560]  Out: 7140 [YDMWE:8984]    Awake alert and oriented x4.   Found sitting up in bed, speaking in full sentences on room air. Chatting on cell phone with his wife. Ncat. Perrl. Oropharynx clear  RRR  CT to left chest dsg c/d/i. cta b.l  abd soft nt nd nabs  No focal deficit  Small area of induration to right groin, no drainage; no surrounding warmth, erythema, no significant ttp. Pilonidal cyst to sacrum, no drainage      Medications Reviewed      Labs: Results:       Chemistry Recent Labs     05/28/22  0554 05/27/22  1600   * 103*    137   K 4.0 3.7    106   CO2 29 28   BUN 9 9   CREA 0.82 0.84   CA 9.6 9.5   AGAP 4 3   BUCR 11* 11*   AP  --  66   TP  --  7.5   ALB  --  3.7   GLOB  --  3.8   AGRAT  --  1.0      CBC w/Diff Recent Labs     05/28/22  0554 05/27/22  1600   WBC 9.4 11.0   RBC 4.95 4.79   HGB 14.1 14.1   HCT 42.1 40.2    369   GRANS 76* 75*   LYMPH 16* 16*   EOS 2 2      Cardiac Enzymes No results for input(s): CPK, CKND1, ANGEL in the last 72 hours. No lab exists for component: CKRMB, TROIP   Coagulation Recent Labs     05/27/22  1600   PTP 13.9   INR 1.0       Lipid Panel Lab Results   Component Value Date/Time    Cholesterol, total 363 (H) 04/23/2010 07:25 AM    HDL Cholesterol 42 04/23/2010 07:25 AM    LDL, calculated 279 (H) 04/23/2010 07:25 AM    VLDL, calculated 42 04/23/2010 07:25 AM    Triglyceride 210 (H) 04/23/2010 07:25 AM    CHOL/HDL Ratio 8.6 (H) 04/23/2010 07:25 AM      BNP No results for input(s): BNPP in the last 72 hours.    Liver Enzymes Recent Labs     05/27/22  1600   TP 7.5   ALB 3.7   AP 66      Thyroid Studies Lab Results   Component Value Date/Time    T4, Total 8.4 10/15/2013 04:15 PM    TSH 0.51 04/22/2010 08:40 PM        Procedures/imaging: see electronic medical records for all procedures/Xrays and details which were not copied into this note but were reviewed prior to creation of Plan

## 2022-05-29 NOTE — CONSULTS
[facility], formerly [facility]  Pulmonary, Critical Care, and Sleep Medicine    Name: Shoshana Peter MRN: [id number]   : 1970 Hospital: [address] [place] [address] Dr   Date: 2022        Pulmonary Follow-up In-Patient Consult                                              Consult requesting physician: Dr. Sharan Goldsmith  Reason for Consult: Left sided pneumothorax s/p chest tube placement. IMPRESSION:   · Left sided PTX s/p biopsy now fully reexpanded with chest tube in place with suction. · Lung mass - biopsy pending  · History or Hodgkins Lymphoma - followed by oncology  · HTN  · VALENTINA  · Tobacco abuse  · Possible COPD      RECOMMENDATIONS:   · Chest tube to water seal today. If CXR remains stable will consider clamping tomorrow. · Continue scheduled duonebs  · Hold on PAP therapy for now until pneumothorax completely resolved. · Will need full pulmonary work-up as outpatient to include full PFTs and sleep study/PAP titration  · Recommend COVID booster - only has received J & J x 1 about 10-11 months ago. · Nutrition: per primary team  · Replace electrolytes  · HOB >=30 degree, aggressive pulmonary toileting, incentive spirometry, PT/OT eval and treat  · GI Prophylaxis: not indicated  · DVT Prophylaxis: heparin  · Smoking cessation counseling done by me and spent >11 minutes on it. Started on nicotine patch and wellbutrin  · Will follow     Subjective/History:     Shoshana Peter is a 45 y.o. male with PMHx significant for Hodgkins lymphoma with recent findings of lung mass who underwent biopsy of left lower lung mass on 2022. Returned to the ED yesterday with acute onset of chest pain and increased dyspnea and noted to have a PTX. Chest tube placed by IR with complete re-expansion of lung and pulmonary consulted to assist with chest tube management over the weekend.   Patient also has history of severe VALENTINA that was diagnosed over 10 years ago and was on BIPAP but DME stopped his therapy so he is now using an old CPAP. Has chronic dyspnea for last 4-6 months with chest tightness as well - no previous pulmonary work-up. Current smoker of at least 1 ppd for 35 + years but ready to stop now.     5/29/2022:  No complaints this am.  No air leak, CXR stable  Dyspnea improved with scheduled nebs    Review of Systems:  Review of systems not obtained due to patient factors. 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 No pertinent surgical history. History reviewed. No pertinent family history. Social History     Tobacco Use    Smoking status: Current Every Day Smoker     Packs/day: 1.00    Smokeless tobacco: Never Used   Substance Use Topics    Alcohol use: Yes     Comment: social          Prior to Admission medications    Medication Sig Start Date End Date Taking? Authorizing Provider   buPROPion XL (WELLBUTRIN XL) 150 mg tablet Take 150 mg by mouth daily. Indications: stop smoking   Yes Provider, Historical   cyanocobalamin (Vitamin B-12) 500 mcg tablet Take 500 mcg by mouth daily. Yes Provider, Historical   cholecalciferol, vitamin D3, 50 mcg (2,000 unit) tab Take 125 mcg by mouth. Yes Provider, Historical   ascorbic acid, vitamin C, (Vitamin C) 500 mg tablet Take 1,000 mg by mouth. Yes Provider, Historical   albuterol (ACCUNEB) 0.63 mg/3 mL nebulizer solution 0.63 mg by Nebulization route every six (6) hours as needed for Wheezing. Yes Provider, Historical   losartan (COZAAR) 50 mg tablet TAKE 1 TABLET BY MOUTH EVERY DAY 4/12/21  Yes Provider, Historical   gabapentin (NEURONTIN) 300 mg capsule Take 300 mg by mouth daily.  6/7/21  Yes Provider, Historical       Current Facility-Administered Medications   Medication Dose Route Frequency    albuterol-ipratropium (DUO-NEB) 2.5 MG-0.5 MG/3 ML  3 mL Nebulization Q6H RT    heparin (porcine) injection 5,000 Units  5,000 Units SubCUTAneous Q12H    acetaminophen (TYLENOL) tablet 650 mg  650 mg Oral Q4H PRN    sodium chloride (NS) flush 5-40 mL  5-40 mL IntraVENous Q8H    sodium chloride (NS) flush 5-40 mL  5-40 mL IntraVENous PRN    polyethylene glycol (MIRALAX) packet 17 g  17 g Oral DAILY PRN    ondansetron (ZOFRAN ODT) tablet 4 mg  4 mg Oral Q8H PRN    Or    ondansetron (ZOFRAN) injection 4 mg  4 mg IntraVENous Q6H PRN    nicotine (NICODERM CQ) 21 mg/24 hr patch 1 Patch  1 Patch TransDERmal DAILY    albuterol (PROVENTIL VENTOLIN) nebulizer solution 2.5 mg  2.5 mg Nebulization Q6H PRN    buPROPion XL (WELLBUTRIN XL) tablet 150 mg  150 mg Oral DAILY    losartan (COZAAR) tablet 50 mg  50 mg Oral DAILY    gabapentin (NEURONTIN) capsule 300 mg  300 mg Oral DAILY    lidocaine 4 % patch 1 Patch  1 Patch TransDERmal Q24H    morphine injection 2 mg  2 mg IntraVENous Q4H PRN         Objective:   Vital Signs:    Visit Vitals  /86   Pulse 63   Temp 98.2 °F (36.8 °C)   Resp 20   Ht 6' (1.829 m)   Wt 127 kg (280 lb)   SpO2 94%   BMI 37.97 kg/m²       O2 Device: None (Room air)   O2 Flow Rate (L/min): 2 l/min   Temp (24hrs), Av.2 °F (36.8 °C), Min:97.7 °F (36.5 °C), Max:98.6 °F (37 °C)       Intake/Output:   Last shift:       07 -  1900  In: 360 [P.O.:360]  Out: 15   Last 3 shifts: 1901 -  0700  In: 1560 [P.O.:1560]  Out: 2934 [Urine:9300]    Intake/Output Summary (Last 24 hours) at 2022 0924  Last data filed at 2022 0858  Gross per 24 hour   Intake 1320 ml   Output 8342 ml   Net -7022 ml       Physical Exam:     General:  Alert, Awake, NAD, cooperative, no distress, appears stated age. Head:   Normocephalic, without obvious abnormality, atraumatic. Eye:   Conjunctivae/corneas clear.  PERRLA, no scleral icterus, no pallor, no cyanosis  Nose:   Nares normal. Septum midline. Mucosa normal without erythema/exudate. No drainage/discharge. No sinus tenderness. Throat:  MP 2-3 with lateral airway narrowing  Neck:   Supple, symmetric, thyroid: no enlargement/tenderness/nodule, no JVD, no carotid bruit, no lymphadenopathy. Trachea midline  Back & spine: Symmetric, no curvature. Chest wall: Chest tube in place, no swelling or pain to palpation  Lung:   Clear to auscultation, no decreased breath sounds or wheezing  Heart:   Regular rate & rhythm. S1 S2 present, no murmur, no gallop, no click, no rub  Abdomen:  Soft, NT, ND, +BS, no masses, no organomegaly  Extremities:  No pedal edema, no cyanosis, no clubbing  Pulses: 2+ and symmetric in DP  Lymphatic:  No cervical, supraclavicular and axillary palpable lymphadenopathy. Musculoskeletal: No joint swelling or tenderness. Neurologic:  Grossly non focal.        Data:         Chemistry Recent Labs     05/28/22  0554 05/27/22  1600   * 103*    137   K 4.0 3.7    106   CO2 29 28   BUN 9 9   CREA 0.82 0.84   CA 9.6 9.5   AGAP 4 3   BUCR 11* 11*   AP  --  66   TP  --  7.5   ALB  --  3.7   GLOB  --  3.8   AGRAT  --  1.0        Lactic Acid No results found for: LAC  No results for input(s): LAC in the last 72 hours. Liver Enzymes Protein, total   Date Value Ref Range Status   05/27/2022 7.5 6.4 - 8.2 g/dL Final     Albumin   Date Value Ref Range Status   05/27/2022 3.7 3.4 - 5.0 g/dL Final     Globulin   Date Value Ref Range Status   05/27/2022 3.8 2.0 - 4.0 g/dL Final     A-G Ratio   Date Value Ref Range Status   05/27/2022 1.0 0.8 - 1.7   Final     Alk.  phosphatase   Date Value Ref Range Status   05/27/2022 66 45 - 117 U/L Final     Recent Labs     05/27/22  1600   TP 7.5   ALB 3.7   GLOB 3.8   AGRAT 1.0   AP 66        CBC w/Diff Recent Labs     05/28/22  0554 05/27/22  1600   WBC 9.4 11.0   RBC 4.95 4.79   HGB 14.1 14.1   HCT 42.1 40.2    369   GRANS 76* 75*   LYMPH 16* 16*   EOS 2 2        Cardiac Enzymes No results found for: CPK, CK, CKMMB, CKMB, RCK3, CKMBT, CKNDX, CKND1, ANGEL, TROPT, TROIQ, KATY, TROPT, TNIPOC, BNP, BNPP     BNP No results found for: BNP, BNPP, XBNPT     Coagulation Recent Labs     05/27/22  1600   PTP 13.9   INR 1.0         Thyroid  Lab Results   Component Value Date/Time    T4, Total 8.4 10/15/2013 04:15 PM    TSH 0.51 04/22/2010 08:40 PM          Lipid Panel Lab Results   Component Value Date/Time    Cholesterol, total 363 (H) 04/23/2010 07:25 AM    HDL Cholesterol 42 04/23/2010 07:25 AM    LDL, calculated 279 (H) 04/23/2010 07:25 AM    VLDL, calculated 42 04/23/2010 07:25 AM    Triglyceride 210 (H) 04/23/2010 07:25 AM    CHOL/HDL Ratio 8.6 (H) 04/23/2010 07:25 AM        ABG No results for input(s): PHI, PHI, POC2, PCO2I, PO2, PO2I, HCO3, HCO3I, FIO2, FIO2I in the last 72 hours. Urinalysis Lab Results   Component Value Date/Time    Color YELLOW 10/15/2013 04:15 PM    Appearance CLEAR 10/15/2013 04:15 PM    Specific gravity 1.010 10/15/2013 04:15 PM    pH (UA) 7.0 10/15/2013 04:15 PM    Protein NEGATIVE 10/15/2013 04:15 PM    Glucose NEGATIVE 10/15/2013 04:15 PM    Ketone NEGATIVE 10/15/2013 04:15 PM    Bilirubin NEGATIVE 10/15/2013 04:15 PM    Urobilinogen 0.2 10/15/2013 04:15 PM    Nitrites NEGATIVE 10/15/2013 04:15 PM    Leukocyte Esterase NEGATIVE 10/15/2013 04:15 PM        Micro  No results for input(s): SDES, CULT in the last 72 hours. No results for input(s): CULT in the last 72 hours. XR (Most Recent). CXR reviewed by me and compared with previous CXR Results from Hospital Encounter encounter on 05/27/22    XR CHEST PORT    Narrative  PORTABLE CHEST RADIOGRAPH    CPT CODE: 87085    INDICATION: Left chest tube placement. COMPARISON: 5/28/2022. FINDINGS:    Frontal view of the chest obtained at 04:06 hours. Stable chest tube at left  base. The cardiomediastinal silhouette is within normal limits. Stable linear  bands of atelectasis at the right perihilar lung.  Increased mild atelectasis at  the basilar left lower lung adjacent to chest tube. No definite pneumothorax  but would be hard to exclude a trace component at left lateral costophrenic  angle adjacent to atelectasis. Evaluation of the osseous structures is stable. Impression  Stable left chest tube. No definite pneumothorax. Increased mild left basilar atelectasis. Stable right perihilar atelectasis. CT (Most Recent) Results from Hospital Encounter encounter on 05/27/22    CT THORACENTESIS INSRT CHEST TUBE    Narrative  PREOPERATIVE DIAGNOSIS: Large left pneumothorax. POSTOPERATIVE DIAGNOSIS: Same    INDICATION: 45years old male status post left lung biopsy with latent  development of large left asymptomatic pneumothorax. Patient is here for  CT-guided chest tube placement. ATTENDING: Dr. Michelle John M.D.    Jenifer Smith: None. PROCEDURES: CT-guided left chest tube placement. ANESTHESIA: Local 1% lidocaine as well as moderate intravenous sedation with  fentanyl given and monitored per independently trained interventional radiology  nurse under my direct supervision for 35 minutes. Please see detailed nursing  record for medication dosing. CONTRAST: None. COMPLICATIONS: None    DRAIN: 24 [place] Thal-Quick left chest tube. CATHETER: None. EBL: Minimal.    SPECIMEN: None. TECHNIQUE:    All CT scans at this facility are performed using dose optimization technique as  appropriate to a performed exam, to include automated exposure control,  adjustment of the mA and/or kV according to patient size (including appropriate  matching for site-specific examinations), or use of iterative reconstruction  technique. The risks, benefits, and alternatives were discussed. Written and verbal consent  obtained. Patient was taken to the CT room and placed supine on the CT table.  view was obtained. Left lower anterior hemithorax was prepped and draped  in the usual sterile fashion.  Maximum sterile barrier technique was used. 1%  local lidocaine was infused and a scan and deep subcutaneous tissues overlying  the entry site. Under direct CT fluoroscopic guidance with an assistance of  18-gauge trocar needle a J-wire left lower chest was cannulated without  difficulty. Left anterior approach. Serial dilatations were performed. 25 Czech  chest tube was advanced over wire and positioned in the left superior and  anterior pleural space. Tube was attached to [facility] to wall suction. External  tubing was affixed to the skin with 0 Prolene. Sterile dressing was applied. There were no immediate complications. Patient tolerated procedure fairly well. Patient was transferred to recovery in stable condition. Dr. Jamie Pandya was  present and performed the procedure. FINDINGS: Initial imaging of the thorax demonstrated complex left pleural  effusion without pneumothorax. CT fluoroscopic guidance demonstrated good  position of the access needle as well as a new 24 [place] Thal-Quick chest tube. Impression  1. Successful uncomplicated CT-guided left chest tube placement. EKG No results found for this or any previous visit. ECHO No results found for this or any previous visit. PFT No flowsheet data found. Other ASA reactivity:   Pre-albumin:   Ionized Calcium:   NH4:   T3, FT4:  Cortisol:  Urine Osm:  Urine Lytes:   HbA1c:      No results found for this or any previous visit (from the past 24 hour(s)).       Telemetry:normal sinus rhythm    The patient is: [x] acutely ill Risk of deterioration: [x] moderate    [] critically ill  [] high     [x]See my orders for details    My assessment, plan of care, findings, medications, side effects etc were discussed with:  [x] Nurse [] PT/OT    [] Respiratory therapy [x] Dr. Víctor Shoemaker   [] Family: answered all questions to satisfaction [x] Patient: answered all questions to satisfaction   [] Pharmacist []      [x] Total critical care time exclusive of procedures 30 minutes with complex decision making, coordination of care and counseling patient performed and > 50% time spent in face to face evaluation.       Gabe Rodriguez MD  5/29/2022

## 2022-05-29 NOTE — CONSULTS
Hematology / Oncology Consult Note      Reason for Consultation:  Florinda Hernandez is a 45 y.o. male who I've been asked to consult for lung Mass      Subjective:     HPI:      Mr. 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 He was status post 6 cycles of ABVD chemotherapy, last treatment was on 1/2/2008. S.p Right axillary XRT. with a recent findings of lung mass who underwent left lower lung mass biopsy on 5/26/2022. Patient presented to the ED on 5/27/2022 with worsening shortness of breath and was found to have left sided pneumothorax. S/P chest tube placement on 5/27/2022    Patient seen and examine. Report breathing has improve and doing well this morning      Oncology History   Status post 6 cycles of ABVD chemotherapy, last treatment was on 1/2/2008. S.p XRT right axilary  Last PET scan was in 2014.  -- 5/3/2021 Cervical MRI reported that the cervical adenopathy identified. Nora La Vernia appears to be prominence of the left cervical lymph node chain suggesting adenopathy in this 4 and 5 would defer however to patient's planned clinical follow-up for Hodgkin's lymphoma such as PET/CT. --  7/23/2021 PET scan reported no finding for suspicious lymph nodes for recurrent lymphoma in the neck or chest. Mildly enlarged right common iliac chain lymph nodes with moderate metabolic activity Deauville 3. These lymph nodes are noted to be new from more remote PET of 2013. Continue close imaging surveillance recommended. -- 7/15/2021 Flow cytometry: No evidence of non-Hodgkin lymphoma or acute leukemia   Unremarkable LDH and CRP. -- 11/2/2021 CT reported subcentimeter right common iliac nodes without significant change in size since recent PET/CT.  + Mildly enlarged bilateral external iliac chain nodes and enlarged portacaval  node also similar to PET/CT on 7/15/2021.  Recommend attention on follow-up imaging.  -- Clinical stable, asymptomatic. No B symptoms reported. -- 4/29/2022 CT CAP reported an iInterval development of multiple pulmonary masses, findings are concerning for neoplastic process.   + No significant interval change in size of previously described portacaval lymph nodes and bilateral external iliac chain lymph nodes. + Mild splenomegaly. -- He admitted he has heavy smoking history, more than 1PPD for about 35 years. PET Results (most recent):  Results from Orders Only encounter on 05/13/22    PET/CT TUMOR IMAGE SKULL THIGH (SUB)    Narrative  POSITRON EMISSION TOMOGRAPHY (WITH NONCONTRAST CT)      HISTORY: Nodular sclerosis Hodgkin's lymphoma of lymph nodes at the axilla. Chemotherapy last received in 2008. Cervical adenopathy. Also current smoker  with lung mass status post left lower lobe biopsy on 5/26/2022. INDICATION: Subsequent treatment (restaging or monitoring)    REFERENCES: 7/23/2021 PET/CT. Prior diagnostic chest CT on 4/29/2022, chest  x-rays yesterday. TECHNIQUE: Following i.v. administration of 8.0 mCi 18F-fluorodeoxyglucose  (FDG), PET was performed spanning from the skull base to the mid thighs. CT was  performed just prior to PET, after administration of oral contrast only, to  provide attenuation correction and anatomic localization for the PET. CT images  acquired with PET are not standard diagnostic CT, limited by lack of i.v.  contrast, reduced mAs (x-rays), and slower imaging during respiration (to  improve fusion with PET images). All CT scans at this facility are performed  using dose optimization techniques as appropriate to a performed exam, to  include automated exposure control, adjustment of the mA and/or kV according to  patient's size (including appropriate matching for site specific examinations),  or use of iterative reconstruction technique. Serum glucose =  82 mg/dL    FINDINGS:    PET images:    Mediastinal blood pool reference value =  2.6    SUV Max.   Mediastinal blood pool reference value =  1.6    SUV Mean. Liver parenchyma reference value =  3.3   SUV Max. Liver parenchyma reference value =  2.9    SUV Mean. NECK: No suspicious hypermetabolic activity at the neck. Small lymph nodes in  the left, largest a left level 3 lymph node measuring 9 mm (44) and is without  hypermetabolic activity. CHEST: Nodular density superiorly right middle lobe adjacent to the minor  fissure has lobulated and fairly well-defined borders as before. It demonstrates  increased metabolic activity over background with Max SUV 4.8. Nodular density left lower lobe posteriorly biopsied yesterday not as well seen,  partially obscured by surrounding airspace disease, abutting the pleura  posteriorly. Max SUV 5.1. New since 11/2/2021. Nodular density in the right lung base adjacent to pleura shows Max SUV 5.4  (128). New since 11/2/2021. There are also several new 3 to 4 mm nodules right lower lobe medially and  centrally, right middle lobe inferiorly, lingula posteriorly, left lower lobe  anteriorly without malignant activity. No recurrent axillary adenopathy. ABDOMEN: No suspicious hypermetabolic activity. 2 cm portacaval lymph node (151)  without hypermetabolic activity. Max SUV 2.3. Stable since 11/2/2021. Activity at the spleen is less than that of the liver and without focal  abnormalities. PELVIS: Largest groin lymph node  is on the right and measures 13 mm with Max  SUV 1.7. Stable since 11/2/2021. No lymph nodes demonstrate malignant activity. Focal skin thickening at the medial right thigh just below the perineum shows  increased activity with Max SUV 4.1 (284). Additional CT findings: Cervical fusion. There is new 50% pneumothorax on the  left. No inversion of the diaphragm, shift of the mediastinum. Liver overall low  attenuation. Lumbar fusion. Hypertrophic changes both hips.     Impression  Multiple lung lesions showing abnormal increased activity and worrisome for  malignancy, possibly lymphoma, not typical time course for bronchogenic  carcinoma:  -right middle lobe,  -right lower lobe, and  -biopsied lesion left lower lobe. No malignant activity at multiple scattered new subcentimeter pulmonary nodules. Potential for false negative result exists due to their small size and attention  should be paid to these regions on subsequent examinations. Mildly enlarged lymph nodes at left neck, portacaval, right groin without  malignant activity. Focal activity medial right thigh probably skin lesion like folliculitis or  focal cellulitis or sebaceous cyst. Correlate with physical examination. Moderately large new left-sided pneumothorax, one day status post lung biopsy. D/w Dr. Elma Johnson at [facility] Interventional Radiology suite. Review of Systems:   Constitutional:  Negative for fever, chills,   HENT: Negative for nosebleeds, . Respiratory: Negative for apnea, chest tightness, wheezing and stridor. Cardiovascular: Negative for chest pain, palpitations and leg swelling. Gastrointestinal: Negative for abdominal pain. Genitourinary: Negative for dysuria, urgency, hematuria, flank pain and difficulty urinating. Neurological:. Negative for dizziness, .    Psychiatric/Behavioral: Negative for hallucinations, confusion, Positive for anxiety     Physical Assessment:     Visit Vitals  /86   Pulse 63   Temp 98.2 °F (36.8 °C)   Resp 20   Ht 6' (1.829 m)   Wt 127 kg (280 lb)   SpO2 94%   BMI 37.97 kg/m²       Temp (24hrs), Av.2 °F (36.8 °C), Min:97.7 °F (36.5 °C), Max:98.6 °F (37 °C)      Physical Exam:    General: sitting up in bed, NAD  HEENT:  Anicteric sclerae; mucosa moist  Resp:  S/P left chest tube, no rales/ wheezing/ rhonchi noted  CV:  regular rate and rhythm  Extremities:  +2 pulses on all extremities; no edema/ cyanosis/ clubbing noted  Skin:  Warm;   Neurologic:  Non-focal  Devices:  chest tube        Assessment:   # Lung mass  # History of 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 # Tobacco dependence  --- PET/CT on 5/27/2022   Multiple lung lesions showing abnormal increased activity and worrisome for  malignancy, possibly lymphoma, not typical time course for bronchogenic  carcinoma:  -right middle lobe,   -right lower lobe, and   -biopsied lesion left lower lobe.     No malignant activity at multiple scattered new subcentimeter pulmonary nodules. Potential for false negative result exists due to their small size and attention  should be paid to these regions on subsequent examinations.     Mildly enlarged lymph nodes at left neck, portacaval, right groin without  malignant activity.     Focal activity medial right thigh probably skin lesion like folliculitis or  focal cellulitis or sebaceous cyst. Correlate with physical examination    ---- S/P CT guided lung mass biopsy on 5/26/2022: Pending lung biopsy report   --- Continue management of chest tube and Pulmonary recommendation     --- continue medical management by primary medical team    --- Patient has a follow up appointment with Dr. Gregory Whittington outpatient clinic on 5/31/2022. To review scans and biopsy report. Pt aware of this plan and also this can always be change base on the patients response to recommended chest tube care/recommendation. 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 No pertinent surgical history. History reviewed. No pertinent family history.   No Known Allergies    Home Medications:   Home Meds reviewed with patient    Hospital Medications:   Current hospital medications reviewed    Labs:  Recent Labs     05/28/22  0554 05/27/22  1600 05/26/22  0907   WBC 9.4 11.0 9.3   RBC 4.95 4.79 4.93   HCT 42.1 40.2 41.6 MCV 85.1 83.9 84.4   MCH 28.5 29.4 28.6   MCHC 33.5 35.1 33.9   RDW 12.6 12.5 12.6       Recent Labs     05/28/22  0554 05/27/22  1600 05/26/22  0907   CO2 29 28 28   BUN 9 9 10       No results for input(s): ALBUMIN in the last 72 hours. No lab exists for component: [facility], [facility]    Thank you for requesting us to consult on your patient. We appreciate the opportunity to participate in your patient's care. Please call if you have questions.       Tod Garcia, DNP

## 2022-05-30 ENCOUNTER — APPOINTMENT (OUTPATIENT)
Dept: GENERAL RADIOLOGY | Age: 52
DRG: 201 | End: 2022-05-30
Attending: RADIOLOGY
Payer: COMMERCIAL

## 2022-05-30 ENCOUNTER — APPOINTMENT (OUTPATIENT)
Dept: GENERAL RADIOLOGY | Age: 52
DRG: 201 | End: 2022-05-30
Attending: PHYSICIAN ASSISTANT
Payer: COMMERCIAL

## 2022-05-30 PROCEDURE — 99233 SBSQ HOSP IP/OBS HIGH 50: CPT | Performed by: INTERNAL MEDICINE

## 2022-05-30 PROCEDURE — 71045 X-RAY EXAM CHEST 1 VIEW: CPT

## 2022-05-30 PROCEDURE — 94640 AIRWAY INHALATION TREATMENT: CPT

## 2022-05-30 PROCEDURE — 74011250636 HC RX REV CODE- 250/636: Performed by: INTERNAL MEDICINE

## 2022-05-30 PROCEDURE — 74011000250 HC RX REV CODE- 250: Performed by: INTERNAL MEDICINE

## 2022-05-30 PROCEDURE — 87205 SMEAR GRAM STAIN: CPT

## 2022-05-30 PROCEDURE — 99232 SBSQ HOSP IP/OBS MODERATE 35: CPT | Performed by: HOSPITALIST

## 2022-05-30 PROCEDURE — 65660000004 HC RM CVT STEPDOWN

## 2022-05-30 PROCEDURE — 74011000250 HC RX REV CODE- 250: Performed by: NURSE PRACTITIONER

## 2022-05-30 PROCEDURE — 74011250637 HC RX REV CODE- 250/637: Performed by: NURSE PRACTITIONER

## 2022-05-30 PROCEDURE — 77010033678 HC OXYGEN DAILY

## 2022-05-30 PROCEDURE — 94762 N-INVAS EAR/PLS OXIMTRY CONT: CPT

## 2022-05-30 RX ORDER — IPRATROPIUM BROMIDE AND ALBUTEROL SULFATE 2.5; .5 MG/3ML; MG/3ML
3 SOLUTION RESPIRATORY (INHALATION)
Status: DISCONTINUED | OUTPATIENT
Start: 2022-05-30 | End: 2022-05-31

## 2022-05-30 RX ORDER — FLUTICASONE FUROATE, UMECLIDINIUM BROMIDE AND VILANTEROL TRIFENATATE 200; 62.5; 25 UG/1; UG/1; UG/1
1 POWDER RESPIRATORY (INHALATION) DAILY
Qty: 3 EACH | Refills: 3 | Status: SHIPPED | OUTPATIENT
Start: 2022-05-30

## 2022-05-30 RX ADMIN — LOSARTAN POTASSIUM 50 MG: 50 TABLET, FILM COATED ORAL at 08:39

## 2022-05-30 RX ADMIN — SODIUM CHLORIDE, PRESERVATIVE FREE 10 ML: 5 INJECTION INTRAVENOUS at 21:52

## 2022-05-30 RX ADMIN — GABAPENTIN 300 MG: 300 CAPSULE ORAL at 08:39

## 2022-05-30 RX ADMIN — SODIUM CHLORIDE, PRESERVATIVE FREE 10 ML: 5 INJECTION INTRAVENOUS at 05:25

## 2022-05-30 RX ADMIN — SODIUM CHLORIDE, PRESERVATIVE FREE 10 ML: 5 INJECTION INTRAVENOUS at 13:24

## 2022-05-30 RX ADMIN — IPRATROPIUM BROMIDE AND ALBUTEROL SULFATE 3 ML: 2.5; .5 SOLUTION RESPIRATORY (INHALATION) at 20:00

## 2022-05-30 RX ADMIN — IPRATROPIUM BROMIDE AND ALBUTEROL SULFATE 3 ML: 2.5; .5 SOLUTION RESPIRATORY (INHALATION) at 14:42

## 2022-05-30 RX ADMIN — BUPROPION HYDROCHLORIDE 150 MG: 150 TABLET, FILM COATED, EXTENDED RELEASE ORAL at 08:39

## 2022-05-30 RX ADMIN — HEPARIN SODIUM 5000 UNITS: 5000 INJECTION INTRAVENOUS; SUBCUTANEOUS at 11:23

## 2022-05-30 RX ADMIN — IPRATROPIUM BROMIDE AND ALBUTEROL SULFATE 3 ML: .5; 2.5 SOLUTION RESPIRATORY (INHALATION) at 07:16

## 2022-05-30 RX ADMIN — HEPARIN SODIUM 5000 UNITS: 5000 INJECTION INTRAVENOUS; SUBCUTANEOUS at 23:29

## 2022-05-30 RX ADMIN — HEPARIN SODIUM 5000 UNITS: 5000 INJECTION INTRAVENOUS; SUBCUTANEOUS at 02:00

## 2022-05-30 NOTE — PROGRESS NOTES
Hospitalist Progress Note    Patient: Shannan Curtis MRN: [id number]  CSN: [id number]    YOB: 1970  Age: 45 y.o. Sex: male    DOA: 5/27/2022 LOS:  LOS: 3 days          Patient seen and examined at bedside, he reports he is tolerating chest tube fairly well. He denies chest pain. Appetite is okay. Right groin no drainage, but pilonidal cyst has begun draining since this morning, foul-smelling. He declines surgical intervention for this. Assessment/Plan     1. Left PTX s/p biopsy. keep chest tube on suction today. No air leak noted. Chest tube is on waterseal at this time. Pulmonology input appreciated. 2. Lung mass - bx pending  3. 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 s/p 6 cycles of ABVD chemotherapy, last treatment was on 1/2/2008. S.p Right axillary XRT. PET scan 5/13/22 noted. Follow bx. Primary oncologist Dr. Joel Diaz. NP [facility] consulted. 4. megan. Needs outpatient follow-up with Dr. Zulma Hightower. 5. Hypertension on cozaar. 6. Tobacco use d/o. Patch. Smoking cessation education. 7. Possible COPD  8. Folliculitis, no active drainage. 9. Pilonidal cyst. Patient declines surgical intervention. Wound culture sent. 10. Recommend COVID booster - only has received J [employer] J x 1 about 10-11 months ago. 11. dvt prophylaxis  12. Full code. cvt monitoring. I discussed the case with Dr. Bear Steven.       Additional Notes:      Case discussed with:  [x]Patient  []Family  [x]Nursing  []Case Management  DVT Prophylaxis:  []Lovenox  []Hep SQ  []SCDs  []Coumadin   []On Heparin gtt    Vital signs/Intake and Output:  Visit Vitals  BP (!) 143/86 (BP 1 Location: Left upper arm, BP Patient Position: At rest)   Pulse 70   Temp 98.6 °F (37 °C)   Resp 18   Ht 6' (1.829 m)   Wt 130.2 kg (287 lb)   SpO2 94%   BMI 38.92 kg/m²     Current Shift:  05/30 0701 - 05/30 1900  In: 0   Out: 10   Last three shifts:  05/28 1901 - 05/30 0700  In: 1080 [P.O.:1080]  Out: 95044 [[id number]]    Awake alert and oriented x4. Found sitting up in bed, speaking in full sentences on room air. Ncat. Perrl. Oropharynx clear  RRR  CT to left chest dsg c/d/i. cta b.l  abd soft nt nd nabs  No focal deficit  Small area of induration to right groin, no drainage; no surrounding warmth, erythema, no significant ttp. Pilonidal cyst to sacrum, no drainage      Medications Reviewed      Labs: Results:       Chemistry Recent Labs     05/28/22  0554 05/27/22  1600   * 103*    137   K 4.0 3.7    106   CO2 29 28   BUN 9 9   CREA 0.82 0.84   CA 9.6 9.5   AGAP 4 3   BUCR 11* 11*   AP  --  66   TP  --  7.5   ALB  --  3.7   GLOB  --  3.8   AGRAT  --  1.0      CBC w/Diff Recent Labs     05/28/22  0554 05/27/22  1600   WBC 9.4 11.0   RBC 4.95 4.79   HGB 14.1 14.1   HCT 42.1 40.2    369   GRANS 76* 75*   LYMPH 16* 16*   EOS 2 2      Cardiac Enzymes No results for input(s): CPK, CKND1, ANGEL in the last 72 hours. No lab exists for component: CKRMB, TROIP   Coagulation Recent Labs     05/27/22  1600   PTP 13.9   INR 1.0       Lipid Panel Lab Results   Component Value Date/Time    Cholesterol, total 363 (H) 04/23/2010 07:25 AM    HDL Cholesterol 42 04/23/2010 07:25 AM    LDL, calculated 279 (H) 04/23/2010 07:25 AM    VLDL, calculated 42 04/23/2010 07:25 AM    Triglyceride 210 (H) 04/23/2010 07:25 AM    CHOL/HDL Ratio 8.6 (H) 04/23/2010 07:25 AM      BNP No results for input(s): BNPP in the last 72 hours.    Liver Enzymes Recent Labs     05/27/22  1600   TP 7.5   ALB 3.7   AP 66      Thyroid Studies Lab Results   Component Value Date/Time    T4, Total 8.4 10/15/2013 04:15 PM    TSH 0.51 04/22/2010 08:40 PM        Procedures/imaging: see electronic medical records for all procedures/Xrays and details which were not copied into this note but were reviewed prior to creation of Plan

## 2022-05-30 NOTE — PROGRESS NOTES
Bedside, Verbal, and Written shift change report given to Sameer Beasley RN (oncoming nurse) by Nicolasa William RN (offgoing nurse). Report included the following information SBAR, Kardex, Intake/Output, MAR, Recent Results, and Cardiac Rhythm NSR.     0725: chest tube marked at 120 and still on gravity. 1140: chest tube marked at 130 and still on gravity. 1345: culture obtained of sacral cyst and sent to lab     1355: pt ambulating in hallway on room air    1800: chest tube marked at 155 and still on gravity.

## 2022-05-30 NOTE — PROGRESS NOTES
1900: Bedside shift change report given to RUDOLPH Castillo (oncoming nurse) by Tejal Ambriz (offgoing nurse). Report included the following information SBAR, Kardex, Intake/Output, MAR, Recent Results and Cardiac Rhythm NSR.     1930: Shift  Assessment done. V/S obtained. No signs of distress. No c/o Pain or SOB. Pt is A/Ox4. Currently on Room air and sitting in his recliner. Chest tube marked at 155 and still on gravity. No needs voiced. 1945: pt ambulating in hallway on room air. 2330: Shift reassessment done. V/S obtained. No changes in previous assessment noted. No c/o pain or SOB. Chest tube still marked on 155 and still on gravity. Patient in bed, watching TV. No needs voiced. Call light within reach. 0000: due meds given. Pt resting quietly.

## 2022-05-30 NOTE — PROGRESS NOTES
[address] [place] [address] [facility]  Pulmonary, Critical Care, and Sleep Medicine    Name: Kenyon Carlos MRN: [id number]   : 1970 Hospital: [address] [place] [address]   Date: 2022        Pulmonary Follow-up In-Patient Consult                                              Consult requesting physician: Dr. Dori Upton  Reason for Consult: Left sided pneumothorax s/p chest tube placement. IMPRESSION:   · Left sided PTX s/p biopsy now fully reexpanded with chest tube in place with suction. · Lung mass - biopsy pending  · History or Hodgkins Lymphoma - followed by oncology  · HTN  · VALENTINA  · Tobacco abuse  · Likely COPD      RECOMMENDATIONS:   · Continue chest tube to water seal.  · If CXR shows resolved PTX, then will consider clamping tomorrow. · Counseled patient extensively regarding frequent incentive spirometry and frequent ambulation to help resorb pneumothorax. · Supplemental oxygen while at rest to help resorb PTX. · Continue scheduled duonebs  · Will need full pulmonary work-up as outpatient to include full PFTs and sleep study/PAP titration  · F/U biopsy results per Oncology  · Recommend COVID booster - only has received J & J x 1 about 10-11 months ago. · Scheduled bronchodilators - duonebs TIDwa and PRN, start Trelegy ellipta 200mcg 1 puff once daily as outpatient and continue albuterol HFA PRN  · Nutrition: per primary team  · Replace electrolytes  · HOB >=30 degree, aggressive pulmonary toileting, incentive spirometry, PT/OT eval and treat  · GI Prophylaxis: not indicated  · DVT Prophylaxis: heparin  · Smoking cessation counseling done by me and spent >11 minutes on it. Continue on nicotine patch and wellbutrin  · Will follow     Subjective/History:   22  Patient seen and examined at bedside. No acute events overnight. Patient reports ongoing pleuritic chest pain with deep inspiration.         HPI per Dr. Levert Klinefelter is a 45 y.o. male with PMHx significant for Hodgkins lymphoma with recent findings of lung mass who underwent biopsy of left lower lung mass on 5/26/2022. Returned to the ED yesterday with acute onset of chest pain and increased dyspnea and noted to have a PTX. Chest tube placed by IR with complete re-expansion of lung and pulmonary consulted to assist with chest tube management over the weekend. Patient also has history of severe VALENTINA that was diagnosed over 10 years ago and was on BIPAP but DME stopped his therapy so he is now using an old CPAP. Has chronic dyspnea for last 4-6 months with chest tightness as well - no previous pulmonary work-up. Current smoker of at least 1 ppd for 35 + years but ready to stop now. Review of Systems:  Review of systems not obtained due to patient factors. 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 No pertinent surgical history. History reviewed. No pertinent family history. Social History     Tobacco Use    Smoking status: Current Every Day Smoker     Packs/day: 1.00    Smokeless tobacco: Never Used   Substance Use Topics    Alcohol use: Yes     Comment: social          Prior to Admission medications    Medication Sig Start Date End Date Taking? Authorizing Provider   fluticasone-umeclidin-vilanter (Trelegy Ellipta) 026-52.7-63 mcg inhaler Take 1 Puff by inhalation daily. Rinse and gargle after each use 5/30/22  Yes Mariam Cheung MD   buPROPion XL (WELLBUTRIN XL) 150 mg tablet Take 150 mg by mouth daily. Indications: stop smoking   Yes Provider, Historical   cyanocobalamin (Vitamin B-12) 500 mcg tablet Take 500 mcg by mouth daily. Yes Provider, Historical   cholecalciferol, vitamin D3, 50 mcg (2,000 unit) tab Take 125 mcg by mouth.    Yes Provider, Historical   ascorbic acid, vitamin C, (Vitamin C) 500 mg tablet Take 1,000 mg by mouth. Yes Provider, Historical   albuterol (ACCUNEB) 0.63 mg/3 mL nebulizer solution 0.63 mg by Nebulization route every six (6) hours as needed for Wheezing. Yes Provider, Historical   losartan (COZAAR) 50 mg tablet TAKE 1 TABLET BY MOUTH EVERY DAY 21  Yes Provider, Historical   gabapentin (NEURONTIN) 300 mg capsule Take 300 mg by mouth daily.  21  Yes Provider, Historical       Current Facility-Administered Medications   Medication Dose Route Frequency    albuterol-ipratropium (DUO-NEB) 2.5 MG-0.5 MG/3 ML  3 mL Nebulization Q6HWA RT    heparin (porcine) injection 5,000 Units  5,000 Units SubCUTAneous Q12H    acetaminophen (TYLENOL) tablet 650 mg  650 mg Oral Q4H PRN    sodium chloride (NS) flush 5-40 mL  5-40 mL IntraVENous Q8H    sodium chloride (NS) flush 5-40 mL  5-40 mL IntraVENous PRN    polyethylene glycol (MIRALAX) packet 17 g  17 g Oral DAILY PRN    ondansetron (ZOFRAN ODT) tablet 4 mg  4 mg Oral Q8H PRN    Or    ondansetron (ZOFRAN) injection 4 mg  4 mg IntraVENous Q6H PRN    nicotine (NICODERM CQ) 21 mg/24 hr patch 1 Patch  1 Patch TransDERmal DAILY    albuterol (PROVENTIL VENTOLIN) nebulizer solution 2.5 mg  2.5 mg Nebulization Q6H PRN    buPROPion XL (WELLBUTRIN XL) tablet 150 mg  150 mg Oral DAILY    losartan (COZAAR) tablet 50 mg  50 mg Oral DAILY    gabapentin (NEURONTIN) capsule 300 mg  300 mg Oral DAILY    lidocaine 4 % patch 1 Patch  1 Patch TransDERmal Q24H    morphine injection 2 mg  2 mg IntraVENous Q4H PRN         Objective:   Vital Signs:    Visit Vitals  /85   Pulse 87   Temp 99 °F (37.2 °C)   Resp 18   Ht 6' (1.829 m)   Wt 130.2 kg (287 lb)   SpO2 94%   BMI 38.92 kg/m²       O2 Device: Nasal cannula   O2 Flow Rate (L/min): 2 l/min   Temp (24hrs), Av.1 °F (36.7 °C), Min:97.1 °F (36.2 °C), Max:99 °F (37.2 °C)       Intake/Output:   Last shift:      No intake/output data recorded. Last 3 shifts: 05/29 0701 - 05/30 1900  In: 1080 [P.O.:1080]  Out: 5300 [Urine:5225]    Intake/Output Summary (Last 24 hours) at 5/30/2022 1950  Last data filed at 5/30/2022 1759  Gross per 24 hour   Intake 0 ml   Output 945 ml   Net -945 ml       Physical Exam:     General:  Alert, Awake, NAD, cooperative, no distress, appears stated age, ambulates without difficult  Head:   Normocephalic, without obvious abnormality, atraumatic. Eye:   Conjunctivae/corneas clear. PERRLA, no scleral icterus, no pallor, no cyanosis  Nose:   Nares normal. Septum midline. Mucosa normal without erythema/exudate. No drainage/discharge. No sinus tenderness. Throat:  MP IV with lateral airway narrowing  Neck:   Supple, symmetric, thyroid: no enlargement/tenderness/nodule, no JVD, no carotid bruit, no lymphadenopathy. Trachea midline  Back & spine: Symmetric, no curvature. Chest wall: Chest tube in place, no swelling or pain to palpation, left chest tube in place on waterseal without air leak  Lung:   Clear to auscultation, no decreased breath sounds or wheezing  Heart:   Regular rate & rhythm. S1 S2 present, no murmur, no gallop, no click, no rub  Abdomen:  Soft, NT, ND, +BS, no masses, no organomegaly  Extremities:  No pedal edema, no cyanosis, no clubbing  Pulses: 2+ and symmetric in DP  Lymphatic:  No cervical, supraclavicular and axillary palpable lymphadenopathy. Musculoskeletal: No joint swelling or tenderness. Neurologic:  Grossly non focal, strength and coordination grossly intact        Data:         Chemistry Recent Labs     05/28/22  0554   *      K 4.0      CO2 29   BUN 9   CREA 0.82   CA 9.6   AGAP 4   BUCR 11*        Lactic Acid No results found for: LAC  No results for input(s): LAC in the last 72 hours.      Liver Enzymes Protein, total   Date Value Ref Range Status   05/27/2022 7.5 6.4 - 8.2 g/dL Final     Albumin   Date Value Ref Range Status   05/27/2022 3.7 3.4 - 5.0 g/dL Final Globulin   Date Value Ref Range Status   05/27/2022 3.8 2.0 - 4.0 g/dL Final     A-G Ratio   Date Value Ref Range Status   05/27/2022 1.0 0.8 - 1.7   Final     Alk. phosphatase   Date Value Ref Range Status   05/27/2022 66 45 - 117 U/L Final     No results for input(s): TP, ALB, GLOB, AGRAT, AP, TBIL in the last 72 hours. No lab exists for component: SGOT, GPT, DBIL     CBC w/Diff Recent Labs     05/28/22  0554   WBC 9.4   RBC 4.95   HGB 14.1   HCT 42.1      GRANS 76*   LYMPH 16*   EOS 2        Cardiac Enzymes No results found for: CPK, CK, CKMMB, CKMB, RCK3, CKMBT, CKNDX, CKND1, ANGEL, TROPT, TROIQ, KATY, TROPT, TNIPOC, BNP, BNPP     BNP No results found for: BNP, BNPP, XBNPT     Coagulation No results for input(s): PTP, INR, APTT, INREXT, INREXT in the last 72 hours. Thyroid  Lab Results   Component Value Date/Time    T4, Total 8.4 10/15/2013 04:15 PM    TSH 0.51 04/22/2010 08:40 PM          Lipid Panel Lab Results   Component Value Date/Time    Cholesterol, total 363 (H) 04/23/2010 07:25 AM    HDL Cholesterol 42 04/23/2010 07:25 AM    LDL, calculated 279 (H) 04/23/2010 07:25 AM    VLDL, calculated 42 04/23/2010 07:25 AM    Triglyceride 210 (H) 04/23/2010 07:25 AM    CHOL/HDL Ratio 8.6 (H) 04/23/2010 07:25 AM        ABG No results for input(s): PHI, PHI, POC2, PCO2I, PO2, PO2I, HCO3, HCO3I, FIO2, FIO2I in the last 72 hours. Urinalysis Lab Results   Component Value Date/Time    Color YELLOW 10/15/2013 04:15 PM    Appearance CLEAR 10/15/2013 04:15 PM    Specific gravity 1.010 10/15/2013 04:15 PM    pH (UA) 7.0 10/15/2013 04:15 PM    Protein NEGATIVE 10/15/2013 04:15 PM    Glucose NEGATIVE 10/15/2013 04:15 PM    Ketone NEGATIVE 10/15/2013 04:15 PM    Bilirubin NEGATIVE 10/15/2013 04:15 PM    Urobilinogen 0.2 10/15/2013 04:15 PM    Nitrites NEGATIVE 10/15/2013 04:15 PM    Leukocyte Esterase NEGATIVE 10/15/2013 04:15 PM        Micro  No results for input(s): SDES, CULT in the last 72 hours.   No results for input(s): CULT in the last 72 hours. XR (Most Recent). CXR reviewed by me and compared with previous CXR Results from Hospital Encounter encounter on 05/27/22    XR CHEST SNGL V    Narrative  EXAM: PORTABLE CHEST 0731 hours    CLINICAL HISTORY/INDICATION: Left PTX. Chest tube follow up    COMPARISON: Chest x-ray 5/30/2022 at 0302 hours, 5/29/2022. TECHNIQUE: Single AP view    FINDINGS:    No change in the left basal chest tube. Tiny trace left pneumothorax. Persistent  band of increased opacity at the right mid and lower chest. Streaky increased  markings at the left base. Normal pulmonary vascularity. The heart is normal in  size. .    Impression  Tiny trace left pneumothorax. Smaller than on the exam earlier same day possibly simply due to patient  positioning. Stable mid to lower lung infiltrate/atelectasis       CT (Most Recent) Results from Hospital Encounter encounter on 05/27/22    CT THORACENTESIS INSRT CHEST TUBE    Narrative  PREOPERATIVE DIAGNOSIS: Large left pneumothorax. POSTOPERATIVE DIAGNOSIS: Same    INDICATION: 45years old male status post left lung biopsy with latent  development of large left asymptomatic pneumothorax. Patient is here for  CT-guided chest tube placement. ATTENDING: Dr. Soni Peña M.D.    Marlee Garcia: None. PROCEDURES: CT-guided left chest tube placement. ANESTHESIA: Local 1% lidocaine as well as moderate intravenous sedation with  fentanyl given and monitored per independently trained interventional radiology  nurse under my direct supervision for 35 minutes. Please see detailed nursing  record for medication dosing. CONTRAST: None. COMPLICATIONS: None    DRAIN: 24 Turkmen Thal-Quick left chest tube. CATHETER: None. EBL: Minimal.    SPECIMEN: None.     TECHNIQUE:    All CT scans at this facility are performed using dose optimization technique as  appropriate to a performed exam, to include automated exposure control,  adjustment of the mA and/or kV according to patient size (including appropriate  matching for site-specific examinations), or use of iterative reconstruction  technique. The risks, benefits, and alternatives were discussed. Written and verbal consent  obtained. Patient was taken to the CT room and placed supine on the CT table.  view was obtained. Left lower anterior hemithorax was prepped and draped  in the usual sterile fashion. Maximum sterile barrier technique was used. 1%  local lidocaine was infused and a scan and deep subcutaneous tissues overlying  the entry site. Under direct CT fluoroscopic guidance with an assistance of  18-gauge trocar needle a J-wire left lower chest was cannulated without  difficulty. Left anterior approach. Serial dilatations were performed. 25 Fijian  chest tube was advanced over wire and positioned in the left superior and  anterior pleural space. Tube was attached to [facility] to wall suction. External  tubing was affixed to the skin with 0 Prolene. Sterile dressing was applied. There were no immediate complications. Patient tolerated procedure fairly well. Patient was transferred to recovery in stable condition. Dr. Martinez Alonso was  present and performed the procedure. FINDINGS: Initial imaging of the thorax demonstrated complex left pleural  effusion without pneumothorax. CT fluoroscopic guidance demonstrated good  position of the access needle as well as a new 24 Fijian Thal-Quick chest tube. Impression  1. Successful uncomplicated CT-guided left chest tube placement. EKG No results found for this or any previous visit. ECHO No results found for this or any previous visit. PFT No flowsheet data found. Other ASA reactivity:   Pre-albumin:   Ionized Calcium:   NH4:   T3, FT4:  Cortisol:  Urine Osm:  Urine Lytes:   HbA1c:      No results found for this or any previous visit (from the past 24 hour(s)).       Telemetry:normal sinus rhythm    The patient is: [x] acutely ill Risk of deterioration: [x] moderate    [] critically ill  [] high     [x]See my orders for details    My assessment, plan of care, findings, medications, side effects etc were discussed with:  [x] Nurse [] PT/OT    [] Respiratory therapy [x] Dr. Lee Spencer   [] Family: answered all questions to satisfaction [x] Patient: answered all questions to satisfaction   [] Pharmacist []      [x] Total critical care time exclusive of procedures 30 minutes with complex decision making, coordination of care and counseling patient performed and > 50% time spent in face to face evaluation.           Julio Ghosh MD/MPH     Pulmonary, Critical Care Medicine  [facility]

## 2022-05-31 ENCOUNTER — APPOINTMENT (OUTPATIENT)
Dept: GENERAL RADIOLOGY | Age: 52
DRG: 201 | End: 2022-05-31
Attending: PHYSICIAN ASSISTANT
Payer: COMMERCIAL

## 2022-05-31 ENCOUNTER — DOCUMENTATION ONLY (OUTPATIENT)
Dept: PULMONOLOGY | Age: 52
End: 2022-05-31

## 2022-05-31 ENCOUNTER — TELEPHONE (OUTPATIENT)
Dept: PULMONOLOGY | Age: 52
End: 2022-05-31

## 2022-05-31 VITALS
WEIGHT: 282 LBS | HEIGHT: 72 IN | BODY MASS INDEX: 38.19 KG/M2 | SYSTOLIC BLOOD PRESSURE: 148 MMHG | DIASTOLIC BLOOD PRESSURE: 87 MMHG | RESPIRATION RATE: 18 BRPM | OXYGEN SATURATION: 95 % | TEMPERATURE: 98.2 F | HEART RATE: 92 BPM

## 2022-05-31 DIAGNOSIS — J44.9 COPD, MILD (HCC): ICD-10-CM

## 2022-05-31 DIAGNOSIS — J44.9 STAGE 1 MILD COPD BY GOLD CLASSIFICATION (HCC): Primary | ICD-10-CM

## 2022-05-31 PROCEDURE — 71045 X-RAY EXAM CHEST 1 VIEW: CPT

## 2022-05-31 PROCEDURE — 74011250636 HC RX REV CODE- 250/636: Performed by: INTERNAL MEDICINE

## 2022-05-31 PROCEDURE — 99233 SBSQ HOSP IP/OBS HIGH 50: CPT | Performed by: INTERNAL MEDICINE

## 2022-05-31 PROCEDURE — 74011000250 HC RX REV CODE- 250: Performed by: INTERNAL MEDICINE

## 2022-05-31 PROCEDURE — 94762 N-INVAS EAR/PLS OXIMTRY CONT: CPT

## 2022-05-31 PROCEDURE — 99239 HOSP IP/OBS DSCHRG MGMT >30: CPT | Performed by: HOSPITALIST

## 2022-05-31 PROCEDURE — 74011000250 HC RX REV CODE- 250: Performed by: NURSE PRACTITIONER

## 2022-05-31 PROCEDURE — 74011250637 HC RX REV CODE- 250/637: Performed by: PHYSICIAN ASSISTANT

## 2022-05-31 PROCEDURE — 77010033678 HC OXYGEN DAILY

## 2022-05-31 PROCEDURE — 74011250637 HC RX REV CODE- 250/637: Performed by: NURSE PRACTITIONER

## 2022-05-31 PROCEDURE — 94640 AIRWAY INHALATION TREATMENT: CPT

## 2022-05-31 RX ORDER — OXYCODONE AND ACETAMINOPHEN 5; 325 MG/1; MG/1
1 TABLET ORAL
Status: DISCONTINUED | OUTPATIENT
Start: 2022-05-31 | End: 2022-06-01 | Stop reason: HOSPADM

## 2022-05-31 RX ORDER — MORPHINE SULFATE 2 MG/ML
1 INJECTION, SOLUTION INTRAMUSCULAR; INTRAVENOUS
Status: DISCONTINUED | OUTPATIENT
Start: 2022-05-31 | End: 2022-06-01 | Stop reason: HOSPADM

## 2022-05-31 RX ORDER — ALBUTEROL SULFATE 90 UG/1
1 INHALANT RESPIRATORY (INHALATION)
COMMUNITY

## 2022-05-31 RX ORDER — IPRATROPIUM BROMIDE AND ALBUTEROL SULFATE 2.5; .5 MG/3ML; MG/3ML
3 SOLUTION RESPIRATORY (INHALATION)
Status: DISCONTINUED | OUTPATIENT
Start: 2022-05-31 | End: 2022-06-01 | Stop reason: HOSPADM

## 2022-05-31 RX ADMIN — SODIUM CHLORIDE, PRESERVATIVE FREE 10 ML: 5 INJECTION INTRAVENOUS at 06:46

## 2022-05-31 RX ADMIN — ACETAMINOPHEN 650 MG: 325 TABLET ORAL at 08:08

## 2022-05-31 RX ADMIN — IPRATROPIUM BROMIDE AND ALBUTEROL SULFATE 3 ML: 2.5; .5 SOLUTION RESPIRATORY (INHALATION) at 13:05

## 2022-05-31 RX ADMIN — GABAPENTIN 300 MG: 300 CAPSULE ORAL at 08:08

## 2022-05-31 RX ADMIN — LOSARTAN POTASSIUM 50 MG: 50 TABLET, FILM COATED ORAL at 08:08

## 2022-05-31 RX ADMIN — SODIUM CHLORIDE, PRESERVATIVE FREE 10 ML: 5 INJECTION INTRAVENOUS at 13:04

## 2022-05-31 RX ADMIN — BUPROPION HYDROCHLORIDE 150 MG: 150 TABLET, FILM COATED, EXTENDED RELEASE ORAL at 08:08

## 2022-05-31 NOTE — PROGRESS NOTES
Bedside, Verbal, and Written shift change report given to Arcelia Toledo RN (oncoming nurse) by Evita Gomez RN (offgoing nurse). Report included the following information SBAR, Kardex, Intake/Output, MAR, Recent Results, and Cardiac Rhythm NSR.     0730: chest tube marked at 215 and on gravity. 3738: tylenol administered for headache. 1000: pt ambulating in hallway     1130: pt is dressed and took himself off of tele monitor stating he will be leaving later today. Refused heparin shot due to already washing up and getting dressed.

## 2022-05-31 NOTE — DISCHARGE SUMMARY
Discharge Summary    Patient: Param Smith MRN: [id number]  CSN: [id number]    YOB: 1970  Age: 45 y.o. Sex: male    DOA: 5/27/2022 LOS:  LOS: 4 days   Discharge Date:      Admission Diagnoses: Pneumothorax of left lung after biopsy [J95.811]  Pneumothorax [J93.9]    Discharge Diagnoses:    Problem List as of 5/31/2022 Never Reviewed          Codes Class Noted - Resolved    Pneumothorax of left lung after biopsy ICD-10-CM: J95.811  ICD-9-CM: 512.1  5/27/2022 - Present        Pneumothorax ICD-10-CM: J93.9  ICD-9-CM: 512.89  5/27/2022 - Present        Essential hypertension ICD-10-CM: I10  ICD-9-CM: 401.9  5/27/2022 - Present        Morbid obesity ([facility] ICD-10-CM: E66.01  ICD-9-CM: 278.01  5/27/2022 - Present        Cervical radiculopathy ICD-10-CM: M54.12  ICD-9-CM: 723.4  5/27/2022 - Present        Tobacco dependence syndrome ICD-10-CM: F17.200  ICD-9-CM: 305.1  4/20/2017 - Present        Hodgkin lymphoma ([facility] ICD-10-CM: C81.90  ICD-9-CM: 201.90  4/19/2016 - Present              Reason for Admission  45 y.o.  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 Patient reported issues with shortness of breath for the last 6 weeks; however, he noticed after he was discharged from his lung biopsy that his breathing got worse afterward. Patient stated he would try to take a deep breath and thereafter developed sharp pain in his chest for which he followed up with a routinely scheduled PET scan and with his oncologist and found to have pneumothorax prompting presentation to ED. Patient had chest tube placed to his left side and reported it was much easier to take a deep breath. Denied associated chest pain. He reported mild nonproductive cough. Denied any fevers or chills.   Patient admitted to smoking approximately 1 pack/day however he recently started on Wellbutrin for smoking cessation. Discharge Condition: good    PHYSICAL EXAM   Visit Vitals  BP (!) 133/90 (BP 1 Location: Left upper arm, BP Patient Position: At rest)   Pulse 72   Temp 97.6 °F (36.4 °C)   Resp 18   Ht 6' (1.829 m)   Wt 127.9 kg (282 lb)   SpO2 97%   BMI 38.25 kg/m²     Awake alert and oriented x4. Found sitting up in chair, speaking in full sentences on room air. Ncat. Perrl. Oropharynx clear  RRR  Dressing to left chest clean dry intact. Lungs clear to auscultation bilaterally. abd soft nt nd nabs  No focal deficit  Small area of induration to right groin, no drainage; no surrounding warmth, erythema, no significant ttp. Pilonidal cyst to sacrum, no drainage      Hospital Course:   1. Left PTX s/p biopsy. keep chest tube on suction today.  No air leak noted.    Chest tube is on waterseal at this time. Pulmonology input appreciated. 2. Lung mass - bx pending  3. 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 s/p 6 cycles of ABVD chemotherapy, last treatment was on 1/2/2008. S.p Right axillary XRT. PET scan 5/13/22 noted. Follow bx. Primary oncologist Dr. Delphine Rodriguez. NP [facility] consulted. 4. megan. Needs outpatient follow-up with Dr. Shantel Lyon. 5. Hypertension on cozaar. 6. Tobacco use d/o. Patch. Smoking cessation education. 7. Possible COPD  8. Folliculitis, no active drainage. 9. Pilonidal cyst. Patient declines surgical intervention. Wound culture sent: MODERATE MIXED SKIN KRISTA ISOLATED. 10. Recommend COVID booster - only has received J & J x 1 about 10-11 months ago. 11. dvt prophylaxis was given in the form of SCDs  12. Full code. Patient is not homebound and no home health care needs were identified. Discharge to home. Patient agrees, all questions answered to the best my ability. Consults:   pccm Dr. Nick Lux    procedures  1. CT-guided left chest tube placement. Kelly Hernandez M.D. 5/27/22  2. CT removal 5/31/22      Significant Diagnostic Studies:    Lab Results   Component Value Date/Time    WBC 9.4 05/28/2022 05:54 AM    HGB 14.1 05/28/2022 05:54 AM    HCT 42.1 05/28/2022 05:54 AM    PLATELET 414 72/66/5149 05:54 AM    MCV 85.1 05/28/2022 05:54 AM     Lab Results   Component Value Date/Time    Sodium 137 05/28/2022 05:54 AM    Potassium 4.0 05/28/2022 05:54 AM    Chloride 104 05/28/2022 05:54 AM    CO2 29 05/28/2022 05:54 AM    Anion gap 4 05/28/2022 05:54 AM    Glucose 104 (H) 05/28/2022 05:54 AM    BUN 9 05/28/2022 05:54 AM    Creatinine 0.82 05/28/2022 05:54 AM    BUN/Creatinine ratio 11 (L) 05/28/2022 05:54 AM    GFR est AA >60 05/28/2022 05:54 AM    GFR est non-AA >60 05/28/2022 05:54 AM    Calcium 9.6 05/28/2022 05:54 AM     Results     Procedure Component Value Units Date/Time    CULTURE, Cleavon Topete STAIN [[id number]] Collected: 05/30/22 1400    Order Status: Completed Specimen: Wound from Pilonidal Cyst Updated: 05/31/22 1403     Special Requests: NO SPECIAL REQUESTS        GRAM STAIN RARE WBCS SEEN         NO ORGANISMS SEEN        Culture result:       CULTURE IN PROGRESS,FURTHER UPDATES TO FOLLOW              IMAGING  XR Results (most recent):  Results from Hospital Encounter encounter on 05/27/22    XR CHEST PORT    Narrative  EXAMINATION: Chest single view    INDICATION: Left chest tube removal    COMPARISON: 5/31/2022 3 hours prior    FINDINGS: Single frontal view the chest obtained. Mediastinal silhouette normal  in size. Bandlike patchy consolidation in the lower lungs. Mild perihilar  interstitial prominence. No definite pneumothorax. Impression  Patchy bandlike consolidation in the lower lungs and evidence of interstitial  infiltrate/edema similar to prior. No definite pneumothorax. CT Results (most recent):  Results from Hospital Encounter encounter on 05/27/22    CT THORACENTESIS INSRT CHEST TUBE    Narrative  PREOPERATIVE DIAGNOSIS: Large left pneumothorax.     POSTOPERATIVE DIAGNOSIS: Same    INDICATION: 45years old male status post left lung biopsy with latent  development of large left asymptomatic pneumothorax. Patient is here for  CT-guided chest tube placement. ATTENDING: Dr. Kelly Hernandez M.D.    Angelina Olvera: None. PROCEDURES: CT-guided left chest tube placement. ANESTHESIA: Local 1% lidocaine as well as moderate intravenous sedation with  fentanyl given and monitored per independently trained interventional radiology  nurse under my direct supervision for 35 minutes. Please see detailed nursing  record for medication dosing. CONTRAST: None. COMPLICATIONS: None    DRAIN: 24 Portuguese Thal-Quick left chest tube. CATHETER: None. EBL: Minimal.    SPECIMEN: None. TECHNIQUE:    All CT scans at this facility are performed using dose optimization technique as  appropriate to a performed exam, to include automated exposure control,  adjustment of the mA and/or kV according to patient size (including appropriate  matching for site-specific examinations), or use of iterative reconstruction  technique. The risks, benefits, and alternatives were discussed. Written and verbal consent  obtained. Patient was taken to the CT room and placed supine on the CT table.  view was obtained. Left lower anterior hemithorax was prepped and draped  in the usual sterile fashion. Maximum sterile barrier technique was used. 1%  local lidocaine was infused and a scan and deep subcutaneous tissues overlying  the entry site. Under direct CT fluoroscopic guidance with an assistance of  18-gauge trocar needle a J-wire left lower chest was cannulated without  difficulty. Left anterior approach. Serial dilatations were performed. 25 Portuguese  chest tube was advanced over wire and positioned in the left superior and  anterior pleural space. Tube was attached to [facility] to wall suction. External  tubing was affixed to the skin with 0 Prolene. Sterile dressing was applied.     There were no immediate complications. Patient tolerated procedure fairly well. Patient was transferred to recovery in stable condition. Dr. Tamia Avalos was  present and performed the procedure. FINDINGS: Initial imaging of the thorax demonstrated complex left pleural  effusion without pneumothorax. CT fluoroscopic guidance demonstrated good  position of the access needle as well as a new 24 French Thal-Quick chest tube. Impression  1. Successful uncomplicated CT-guided left chest tube placement. Discharge Medications:     Current Discharge Medication List      START taking these medications    Details   fluticasone-umeclidin-vilanter (Trelegy Ellipta) 200-62.5-25 mcg inhaler Take 1 Puff by inhalation daily. Rinse and gargle after each use  Qty: 3 Each, Refills: 3         CONTINUE these medications which have NOT CHANGED    Details   albuterol sulfate (Proair Digihaler) 90 mcg/actuation aebs Take 1 Puff by inhalation every four (4) hours as needed for Wheezing, Shortness of Breath or Cough. buPROPion XL (WELLBUTRIN XL) 150 mg tablet Take 150 mg by mouth daily. Indications: stop smoking      cyanocobalamin (Vitamin B-12) 500 mcg tablet Take 500 mcg by mouth daily. cholecalciferol, vitamin D3, 50 mcg (2,000 unit) tab Take 125 mcg by mouth. ascorbic acid, vitamin C, (Vitamin C) 500 mg tablet Take 1,000 mg by mouth.      losartan (COZAAR) 50 mg tablet TAKE 1 TABLET BY MOUTH EVERY DAY      gabapentin (NEURONTIN) 300 mg capsule Take 300 mg by mouth daily. STOP taking these medications       albuterol (ACCUNEB) 0.63 mg/3 mL nebulizer solution Comments:   Reason for Stopping:               Activity: as tolerated    Diet: cardiac    Wound Care: as directed    Follow-up:  Follow-up Appointments   Procedures    FOLLOW UP VISIT Appointment in: Other ([facility] 1. Sean Nguyen MD 3 to 5 days 2. Dr. Paul Iqbal this week. 3. Dr Shireen Miller. 3 - 4 weeks. Follow up megan. 4. Dr. Bear Donate to arrange clinic follow up. 1. Emily Jose MD 3 to 5 days  2. Dr. Viraj Shore this week. 3. Dr Isela Talbert. 3 - 4 weeks. Follow up megan. 4. Dr. Darling Martinez to arrange clinic follow up. Standing Status:   Standing     Number of Occurrences:   1     Order Specific Question:   Appointment in     Answer:    Other (Specify)       Minutes spent on discharge: >30

## 2022-05-31 NOTE — PROGRESS NOTES
Call made to Pcp Dr. Robyn Lanza office, office will contact patient with follow up. Patient has transitional care follow up with Dr. Jerome Flower office on 6/2/2022 at 1:30 pm, Dr. Ken Lizama office on 7/01/2022 at 9:00 am, Dr. Adriana Waite office on 6/15/2022 at 2:30 pm.  Call made to Central scheduling office to assist patient with pulmonary function test, no answer, stating \"the office is currently closed. \"

## 2022-05-31 NOTE — DISCHARGE INSTRUCTIONS
Patient Education        Chest Tube Removal: What to Expect at [facility]     A chest tube is placed through the chest wall between two ribs. You may have had a chest tube put in to help your collapsed lung expand. Or the tube may have helped drain fluid from a chest infection or surgery. The tube was removed before you came home. You may have some pain in your chest from the cut (incision) where the tube was put in. For most people, the pain goes away after about 2 weeks. You will have a bandage taped over the wound. Your doctor will remove the bandage and examine the wound in about 2 days. It will take about 3 to 4 weeks for your incision to heal completely. It may leave a small scar that will fade with time. This care sheet gives you a general idea about how long it will take for you to recover. But each person recovers at a different pace. Follow the steps below to feel better as quickly as possible. How can you care for yourself at home? Activity    · Rest when you feel tired. Getting enough sleep will help you recover.     · Try to walk each day. Start by walking a little more than you did the day before. Bit by bit, increase the amount you walk. Walking boosts blood flow and helps prevent pneumonia and constipation.     · Avoid strenuous activities, such as bicycle riding, jogging, weight lifting, or aerobic exercise, until your doctor says it is okay.     · How soon you can return to work or your normal routine depends on what health problems you have. Talk with your doctor about how long it will take you to recover.     · You may shower after your bandage is removed. Pat the cut (incision) dry. Do not take a bath for 2 weeks after your chest tube is out, or until your doctor tells you it is okay.     · Practice deep breathing exercises as directed by your doctor. Coughing exercises also can help drain fluid out of your chest.   Diet    · You can eat your normal diet.  If your stomach is upset, try bland, low-fat foods like plain rice, broiled chicken, toast, and yogurt.     · Drink plenty of fluids (unless your doctor tells you not to). Medicines    · Your doctor will tell you if and when you can restart your medicines. You will also get instructions about taking any new medicines.     · If you take aspirin or some other blood thinner, ask your doctor if and when to start taking it again. Make sure that you understand exactly what your doctor wants you to do.     · Be safe with medicines. Take pain medicines exactly as directed. ? If the doctor gave you a prescription medicine for pain, take it as prescribed. ? If you are not taking a prescription pain medicine, ask your doctor if you can take an over-the-counter medicine.     · Take your antibiotics as directed. Do not stop taking them just because you feel better. You need to take the full course of antibiotics. Incision care    · Keep the incision dry as it heals. You will have a bandage over it to help the incision heal and protect it. Your doctor will tell you how to take care of this. Other instructions    · Do not smoke. Smoking makes lung problems worse. If you need help quitting, talk to your doctor about stop-smoking programs and medicines. These can increase your chances of quitting for good. Follow-up care is a key part of your treatment and safety. Be sure to make and go to all appointments, and call your doctor if you are having problems. It's also a good idea to know your test results and keep a list of the medicines you take. When should you call for help? Call 911  anytime you think you may need emergency care. For example, call if:    · You passed out (lost consciousness).     · You have severe trouble breathing.     · You have sudden chest pain and shortness of breath, or you cough up blood.    Call your doctor now or seek immediate medical care if:    · You have trouble breathing.     · Your shortness of breath is getting worse.     · Bright red blood soaks through the bandage over your incision.     · You have a fever. Watch closely for any changes in your health, and be sure to contact your doctor if:    · You do not get better as expected. Where can you learn more? Go to http://www.gray.com/  Enter U748 in the search box to learn more about \"Chest Tube Removal: What to Expect at Home. \"  Current as of: July 6, 2021               Content Version: 13.2  © 2006-2022 [employer]. Care instructions adapted under license by [employer] (which disclaims liability or warranty for this information). If you have questions about a medical condition or this instruction, always ask your healthcare professional. [address] any warranty or liability for your use of this information. Stopping Smoking: Care Instructions  Your Care Instructions     Cigarette smokers crave the nicotine in cigarettes. Giving it up is much harder than simply changing a habit. Your body has to stop craving the nicotine. It is hard to quit, but you can do it. There are many tools that people use to quit smoking. You may find that combining tools works best for you. There are several steps to quitting. First you get ready to quit. Then you get support to help you. After that, you learn new skills and behaviors to become a nonsmoker. For many people, a necessary step is getting and using medicine. Your doctor will help you set up the plan that best meets your needs. You may want to attend a smoking cessation program to help you quit smoking. When you choose a program, look for one that has proven success. Ask your doctor for ideas. You will greatly increase your chances of success if you take medicine as well as get counseling or join a cessation program.  Some of the changes you feel when you first quit tobacco are uncomfortable.  Your body will miss the nicotine at first, and you may feel short-tempered and grumpy. You may have trouble sleeping or concentrating. Medicine can help you deal with these symptoms. You may struggle with changing your smoking habits and rituals. The last step is the tricky one: Be prepared for the smoking urge to continue for a time. This is a lot to deal with, but keep at it. You will feel better. Follow-up care is a key part of your treatment and safety. Be sure to make and go to all appointments, and call your doctor if you are having problems. It's also a good idea to know your test results and keep a list of the medicines you take. How can you care for yourself at home? · Ask your family, friends, and coworkers for support. You have a better chance of quitting if you have help and support. · Join a support group, such as Nicotine Anonymous, for people who are trying to quit smoking. · Consider signing up for a smoking cessation program, such as the American Lung Association's Freedom from Smoking program.  · Get text messaging support. Go to the website at www.smokefree. gov to sign up for the [facility] program.  · Set a quit date. Pick your date carefully so that it is not right in the middle of a big deadline or stressful time. Once you quit, do not even take a puff. Get rid of all ashtrays and lighters after your last cigarette. Clean your house and your clothes so that they do not smell of smoke. · Learn how to be a nonsmoker. Think about ways you can avoid those things that make you reach for a cigarette. ? Avoid situations that put you at greatest risk for smoking. For some people, it is hard to have a drink with friends without smoking. For others, they might skip a coffee break with coworkers who smoke. ? Change your daily routine. Take a different route to work or eat a meal in a different place. · Cut down on stress.  Calm yourself or release tension by doing an activity you enjoy, such as reading a book, taking a hot bath, or gardening. · Talk to your doctor or pharmacist about nicotine replacement therapy, which replaces the nicotine in your body. You still get nicotine but you do not use tobacco. Nicotine replacement products help you slowly reduce the amount of nicotine you need. These products come in several forms, many of them available over-the-counter:  ? Nicotine patches  ? Nicotine gum and lozenges  ? Nicotine inhaler  · Ask your doctor about bupropion (Wellbutrin) or varenicline (Chantix), which are prescription medicines. They do not contain nicotine. They help you by reducing withdrawal symptoms, such as stress and anxiety. · Some people find hypnosis, acupuncture, and massage helpful for ending the smoking habit. · Eat a healthy diet and get regular exercise. Having healthy habits will help your body move past its craving for nicotine. · Be prepared to keep trying. Most people are not successful the first few times they try to quit. Do not get mad at yourself if you smoke again. Make a list of things you learned and think about when you want to try again, such as next week, next month, or next year. Where can you learn more? Go to http://www.gray.com/  Enter [id number] in the search box to learn more about \"Stopping Smoking: Care Instructions. \"  Current as of: October 28, 2021               Content Version: 13.2  © 0305-5951 [employer]. Care instructions adapted under license by [employer] (which disclaims liability or warranty for this information). If you have questions about a medical condition or this instruction, always ask your healthcare professional. [place] [address] any warranty or liability for your use of this information.

## 2022-05-31 NOTE — TELEPHONE ENCOUNTER
----- Message from [address] [place] [address] sent at 5/31/2022  9:05 AM EDT -----  Can you put in a hospital pft order? Thank you  ----- Message -----  From: Ariadna Awad MD  Sent: 5/30/2022   1:53 PM EDT  To: [address] [place] [address]    Please give this patient a followup in my clinic in 2-4 weeks with PFTs. If he can't get PFTs at the office, then send him to the hospital for them. He can be a 15min F/U and fit him where you can.       Nando Rodriguez MD/MPH     Pulmonary, Critical Care Medicine  [facility]

## 2022-05-31 NOTE — PROGRESS NOTES
Hospitalist Progress Note      Patient: Arnulfo Amos MRN: [id number]  CSN: [id number]    YOB: 1970  Age: 45 y.o. Sex: male    DOA: 5/27/2022 LOS:  LOS: 4 days          CT clamped at 9:20 am.    Patient seen and examined at bedside, he reports he is tolerating chest tube. Denies nausea vomiting diarrhea constipation, urinary symptoms. No family at bedside. bx pathology pending. Chest tube removed 1 PM, post chest x-ray ordered for 5 PM.    Patient states his pharmacy is [facility] [address] Assessment/Plan     1. Left PTX s/p biopsy. keep chest tube on suction today. No air leak noted. Chest tube is on waterseal at this time. Pulmonology input appreciated. 2. Lung mass - bx pending  3. 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 s/p 6 cycles of ABVD chemotherapy, last treatment was on 1/2/2008. S.p Right axillary XRT. PET scan 5/13/22 noted. Follow bx. Primary oncologist Dr. Dawson Colby. NP [facility] consulted. 4. megan. Needs outpatient follow-up with Dr. Bettina Reid. 5. Hypertension on cozaar. 6. Tobacco use d/o. Patch. Smoking cessation education. 7. Possible COPD  8. Folliculitis, no active drainage. 9. Pilonidal cyst. Patient declines surgical intervention. Wound culture sent. 10. Recommend COVID booster - only has received J & J x 1 about 10-11 months ago. 11. dvt prophylaxis  12. Full code. cvt monitoring. I discussed the case with Dr. Nando Rodriguez.   Possible discharge this evening pending chest x-ray scheduled for 5 PM.       Additional Notes:      Case discussed with:  [x]Patient  []Family  [x]Nursing  [x]Case Management  DVT Prophylaxis:  []Lovenox  []Hep SQ  []SCDs  []Coumadin   []On Heparin gtt    Vital signs/Intake and Output:  Visit Vitals  BP (!) 146/84 (BP 1 Location: Left upper arm, BP Patient Position: At rest)   Pulse 64   Temp 98.3 °F (36.8 °C)   Resp 18   Ht 6' (1.829 m)   Wt 127.9 kg (282 lb)   SpO2 98%   BMI 38.25 kg/m²     Current Shift:  05/31 0701 - 05/31 1900  In: 0   Out: 60   Last three shifts:  05/29 1901 - 05/31 0700  In: 0   Out: 945 [Urine:900]    Awake alert and oriented x4. Found sitting up in chair, speaking in full sentences on room air. Ncat. Perrl. Oropharynx clear  RRR  CT to left chest dsg c/d/i. cta b.l  abd soft nt nd nabs  No focal deficit  Small area of induration to right groin, no drainage; no surrounding warmth, erythema, no significant ttp. Pilonidal cyst to sacrum, no drainage      Medications Reviewed      Labs: Results:       Chemistry No results for input(s): GLU, NA, K, CL, CO2, BUN, CREA, CA, AGAP, BUCR, TBIL, AP, TP, ALB, GLOB, AGRAT in the last 72 hours. No lab exists for component: GPT   CBC w/Diff No results for input(s): WBC, RBC, HGB, HCT, PLT, GRANS, LYMPH, EOS, HGBEXT, HCTEXT, PLTEXT, HGBEXT, HCTEXT, PLTEXT in the last 72 hours. Cardiac Enzymes No results for input(s): CPK, CKND1, ANGEL in the last 72 hours. No lab exists for component: CKRMB, TROIP   Coagulation No results for input(s): PTP, INR, APTT, INREXT, INREXT in the last 72 hours. Lipid Panel Lab Results   Component Value Date/Time    Cholesterol, total 363 (H) 04/23/2010 07:25 AM    HDL Cholesterol 42 04/23/2010 07:25 AM    LDL, calculated 279 (H) 04/23/2010 07:25 AM    VLDL, calculated 42 04/23/2010 07:25 AM    Triglyceride 210 (H) 04/23/2010 07:25 AM    CHOL/HDL Ratio 8.6 (H) 04/23/2010 07:25 AM      BNP No results for input(s): BNPP in the last 72 hours. Liver Enzymes No results for input(s): TP, ALB, TBIL, AP in the last 72 hours.     No lab exists for component: SGOT, GPT, DBIL   Thyroid Studies Lab Results   Component Value Date/Time    T4, Total 8.4 10/15/2013 04:15 PM    TSH 0.51 04/22/2010 08:40 PM        Procedures/imaging: see electronic medical records for all procedures/Xrays and details which were not copied into this note but were reviewed prior to creation of Plan

## 2022-05-31 NOTE — PROGRESS NOTES
Bedside shift change report given to RUDOLPH Castillo (oncoming nurse) by Luis F Kuhn (offgoing nurse). Report included the following information SBAR, Kardex, Intake/Output, MAR, Recent Results and Cardiac Rhythm NSR.     1900: Pt ready for discharge. 1945: I have reviewed discharge instructions with the patient. The patient verbalized understanding. IV lines removed, pt belongings packed. Waiting for ride. 2016: wheeled pt down to the heart center entrance.

## 2022-05-31 NOTE — PROGRESS NOTES
Interventional Radiology Progress Note    Patient: Benita Mcfarland               Sex: male          DOA: 5/27/2022       YOB: 1970      Age:  45 y.o.        LOS:  LOS: 4 days       Assessment/Plan     Patient is POD#4 s/p left chest tube placement by Dr. Salina Ayala. Chest tube has been to water seal - clamped this morning at 0920. CXR at 1300. If no PTX, will consider chest tube removal this afternoon if Pulm agrees. Appreciate your assistance! Subjective: The patient endorses mild discomfort at the procedure site with movement    The patient denies N/V, HA, dizziness, fever, chills, abdominal pain, dyspnea    Objective:      Visit Vitals  BP (!) 146/84 (BP 1 Location: Left upper arm, BP Patient Position: At rest)   Pulse 64   Temp 98.3 °F (36.8 °C)   Resp 18   Ht 6' (1.829 m)   Wt 127.9 kg (282 lb)   SpO2 98%   BMI 38.25 kg/m²     Lab/Data Reviewed:  No leukocytosis  H&H stable    Recent images:  Procedure images are available  AM CXR read pending  CXR 5/30/22 showed trace left PTX    Interval history:  Patient did well with chest tube to waterseal overnight. He reports improved dyspnea and chest discomfort since chest tube placement    Physical Exam:  Constitutional: NAD. A&Ox4. Respiratory: Normal respiratory effort. Symmetrical rise and fall of chest.    Cardiovascular: Regular rate. Gastrointestinal: Soft, NT, ND.   Procedure site: aTTP, no air leak visualized, light yellow serous output in chest tube    Thank you,  Bambi Jon, [address] simvastatin (ZOCOR) 20 MG tablet     Last Written Prescription Date: 5/25/16  Last Fill Quantity: 90, # refills: 3  Last Office Visit with G, P or [facility] prescribing provider: 5/25/16       Lab Results   Component Value Date    CHOL 171 05/25/2016     Lab Results   Component Value Date    HDL 44 05/25/2016     Lab Results   Component Value Date    LDL 86 05/25/2016     Lab Results   Component Value Date    TRIG 204 05/25/2016     Lab Results   Component Value Date    CHOLHDLRATIO 4.0 11/13/2015

## 2022-05-31 NOTE — PROGRESS NOTES
Per Dr. Jessica Roblero needs hosp fu 2-4 weeks from discharge with full pfts prior. Dr. Marie Gallegos to put in order for pfts to be done at . Try to schedule hosp fu on 6/15 at 230.

## 2022-05-31 NOTE — PROGRESS NOTES
Reason for Admission:  Pneumothorax of left lung after biopsy [J95.811]  Pneumothorax [J93.9]                 RUR Score:    8           Plan for utilizing home health: Yes                     Likelihood of Readmission:   LOW                         Transition of Care Plan:              Initial assessment completed with patient. Cognitive status of patient: oriented to time, place, person and situation at time of assessment. Face sheet information confirmed:  yes. The patient designates wife, Carlos Mann. to participate in his discharge plan and to receive any needed information. This patient lives in a single family home with son and wife. The patient lives in a 2 story home. The patient's bedroom is on the 2nd floor. There are 5 steps to enter from front and 5 steps to enter from the back. Patient is able to navigate steps as needed. Prior to hospitalization, patient was considered to be independent with ADLs/IADLS : yes . Patient has a current ACP document on file: no      Healthcare Decision Maker:     Click here to complete [address] including selection of the Healthcare Decision Maker Relationship (ie \"Primary\")    The wife will be available to transport patient home upon discharge. The patient does not have any durable medical equipment available in the home. Patient is not currently active with home health. The patient previously had home health but does not recall the name of the home health company. The patient would like to [facility] if therapy recommends home health. Patient has not stayed in a skilled nursing facility or rehab. This patient is on dialysis :no    Currently, the discharge plan is Home. The patient states that he can obtain his medications from the pharmacy, and take his medications as directed. Patient's current insurance is [place]. Care Management Interventions  PCP Verified by CM:  Yes  Mode of Transport at Discharge:  Other (see comment) (patients wife)  Transition of Care Consult (CM Consult): Discharge Planning  Support Systems: Spouse/Significant Other,Child(marcos)  Confirm Follow Up Transport: Family  Discharge Location  Patient Expects to be Discharged to[de-identified] Home with family assistance        RODRIGO Slater

## 2022-05-31 NOTE — PROGRESS NOTES
Interventional Radiology    Left chest tube bandage removed, clean skin entry site, sutures removed. At end expiration, chest tube was removed in its entirety with vaseline gauze covering entry site. Dry 4x4 gauze and tegaderm applied. Post chest x-ray ordered for 5 pm.     If no PTX, patient okay for discharge with instructions to return to ER with any shortness of breath, chest pain or difficulty breathing. Follow up arranged with pulmonary offices. Patient instructed to minimize talking and ambulation over the next couple hours and to press the nursing call light if he develops chest pain or dyspnea.     AURELIANO Cifuentes

## 2022-06-01 LAB
BACTERIA SPEC CULT: NORMAL
GRAM STN SPEC: NORMAL
GRAM STN SPEC: NORMAL
SERVICE CMNT-IMP: NORMAL

## 2022-06-01 NOTE — PROGRESS NOTES
[facility]  Pulmonary, Critical Care, and Sleep Medicine    Name: Ramiro Landau MRN: [id number]   : 1970 Hospital: [address] [place] [address] Dr   Date: 2022        Pulmonary Follow-up Consult                                              Consult requesting physician: Dr. Talita Soliz  Reason for Consult: Left sided pneumothorax s/p chest tube placement. IMPRESSION:   · Left sided PTX s/p biopsy now fully reexpanded with chest tube in place with suction. · Lung mass - biopsy pending  · History or Hodgkins Lymphoma - followed by oncology  · HTN  · VALENTINA  · Tobacco abuse  · Likely COPD      RECOMMENDATIONS:   · Discussed with IR, attempt clamping trial  · If CXR shows resolved PTX, then will consider pulling this afternoon  · Counseled patient extensively regarding frequent incentive spirometry and frequent ambulation to help resorb pneumothorax. · Supplemental oxygen while at rest to help resorb PTX. · Continue scheduled duonebs  · Will need full pulmonary work-up as outpatient to include full PFTs and sleep study/PAP titration  · F/U biopsy results per Oncology  · Recommend COVID booster - only has received J & J x 1 about 10-11 months ago. · Scheduled bronchodilators - duonebs TIDwa and PRN, start Trelegy ellipta 200mcg 1 puff once daily as outpatient and continue albuterol HFA PRN  · Nutrition: per primary team  · Replace electrolytes  · HOB >=30 degree, aggressive pulmonary toileting, incentive spirometry, PT/OT eval and treat  · GI Prophylaxis: not indicated  · DVT Prophylaxis: heparin  · Smoking cessation counseling done by me and spent >11 minutes on it. Continue on nicotine patch and wellbutrin  · F/U in clinic in 2-4 weeks. Message sent to      Subjective/History:   22  Patient seen and examined at bedside. No acute events overnight. Patient reports ongoing pleuritic chest pain with deep inspiration.   Ambulates without difficulty      HPI per Dr. Mireya Yang Darryl Walsh is a 45 y.o. male with PMHx significant for Hodgkins lymphoma with recent findings of lung mass who underwent biopsy of left lower lung mass on 5/26/2022. Returned to the ED yesterday with acute onset of chest pain and increased dyspnea and noted to have a PTX. Chest tube placed by IR with complete re-expansion of lung and pulmonary consulted to assist with chest tube management over the weekend. Patient also has history of severe VALENTINA that was diagnosed over 10 years ago and was on BIPAP but DME stopped his therapy so he is now using an old CPAP. Has chronic dyspnea for last 4-6 months with chest tightness as well - no previous pulmonary work-up. Current smoker of at least 1 ppd for 35 + years but ready to stop now. Review of Systems:  Review of systems not obtained due to patient factors. 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 No pertinent surgical history. History reviewed. No pertinent family history. Social History     Tobacco Use    Smoking status: Current Every Day Smoker     Packs/day: 1.00    Smokeless tobacco: Never Used   Substance Use Topics    Alcohol use: Yes     Comment: social          Prior to Admission medications    Medication Sig Start Date End Date Taking? Authorizing Provider   albuterol sulfate (Proair Digihaler) 90 mcg/actuation aebs Take 1 Puff by inhalation every four (4) hours as needed for Wheezing, Shortness of Breath or Cough. Yes Provider, Historical   fluticasone-umeclidin-vilanter (Trelegy Ellipta) 200-62.5-25 mcg inhaler Take 1 Puff by inhalation daily. Rinse and gargle after each use 5/30/22  Yes Russell Salcido MD   buPROPion XL (WELLBUTRIN XL) 150 mg tablet Take 150 mg by mouth daily. Indications: stop smoking   Yes Provider, Historical   cyanocobalamin (Vitamin B-12) 500 mcg tablet Take 500 mcg by mouth daily. Yes Provider, Historical   cholecalciferol, vitamin D3, 50 mcg (2,000 unit) tab Take 125 mcg by mouth. Yes Provider, Historical   ascorbic acid, vitamin C, (Vitamin C) 500 mg tablet Take 1,000 mg by mouth. Yes Provider, Historical   losartan (COZAAR) 50 mg tablet TAKE 1 TABLET BY MOUTH EVERY DAY 21  Yes Provider, Historical   gabapentin (NEURONTIN) 300 mg capsule Take 300 mg by mouth daily. 21  Yes Provider, Historical       No current facility-administered medications for this encounter. Current Outpatient Medications   Medication Sig    albuterol sulfate (Proair Digihaler) 90 mcg/actuation aebs Take 1 Puff by inhalation every four (4) hours as needed for Wheezing, Shortness of Breath or Cough.  fluticasone-umeclidin-vilanter (Trelegy Ellipta) 200-62.5-25 mcg inhaler Take 1 Puff by inhalation daily. Rinse and gargle after each use    buPROPion XL (WELLBUTRIN XL) 150 mg tablet Take 150 mg by mouth daily. Indications: stop smoking    cyanocobalamin (Vitamin B-12) 500 mcg tablet Take 500 mcg by mouth daily.  cholecalciferol, vitamin D3, 50 mcg (2,000 unit) tab Take 125 mcg by mouth.  ascorbic acid, vitamin C, (Vitamin C) 500 mg tablet Take 1,000 mg by mouth.  losartan (COZAAR) 50 mg tablet TAKE 1 TABLET BY MOUTH EVERY DAY    gabapentin (NEURONTIN) 300 mg capsule Take 300 mg by mouth daily.          Objective:   Vital Signs:    Visit Vitals  BP (!) 148/87 (BP 1 Location: Left upper arm, BP Patient Position: At rest;Sitting)   Pulse 92   Temp 98.2 °F (36.8 °C)   Resp 18   Ht 6' (1.829 m)   Wt 127.9 kg (282 lb)   SpO2 95%   BMI 38.25 kg/m²       O2 Device: None (Room air)   O2 Flow Rate (L/min): 2 l/min   Temp (24hrs), Av.9 °F (36.6 °C), Min:97.6 °F (36.4 °C), Max:98.3 °F (36.8 °C)       Intake/Output:   Last shift:      No intake/output data recorded. Last 3 shifts: 05/30 0701 - 05/31 1900  In: 0   Out: 95     Intake/Output Summary (Last 24 hours) at 6/1/2022 0131  Last data filed at 5/31/2022 1127  Gross per 24 hour   Intake 0 ml   Output 60 ml   Net -60 ml       Physical Exam:     General:  Alert, Awake, NAD, cooperative, no distress, appears stated age, ambulates without difficult  Head:   Normocephalic, without obvious abnormality, atraumatic. Eye:   Conjunctivae/corneas clear. PERRLA, no scleral icterus, no pallor, no cyanosis  Nose:   Nares normal. Septum midline. Mucosa normal without erythema/exudate. No drainage/discharge. No sinus tenderness. Throat:  MP IV with lateral airway narrowing  Neck:   Supple, symmetric, thyroid: no enlargement/tenderness/nodule, no JVD, no carotid bruit, no lymphadenopathy. Trachea midline  Back & spine: Symmetric, no curvature. Chest wall: Chest tube in place, no swelling or pain to palpation, left chest tube in place on waterseal without air leak  Lung:   Clear to auscultation, no decreased breath sounds or wheezing  Heart:   Regular rate & rhythm. S1 S2 present, no murmur, no gallop, no click, no rub  Abdomen:  Soft, NT, ND, +BS, no masses, no organomegaly  Extremities:  No pedal edema, no cyanosis, no clubbing  Pulses: 2+ and symmetric in DP  Lymphatic:  No cervical, supraclavicular and axillary palpable lymphadenopathy. Musculoskeletal: No joint swelling or tenderness. Neurologic:  Grossly non focal, strength and coordination grossly intact        Data:         Chemistry No results for input(s): GLU, NA, K, CL, CO2, BUN, CREA, CA, MG, PHOS, AGAP, BUCR, TBIL, AP, TP, ALB, GLOB, AGRAT in the last 72 hours. No lab exists for component: GPT     Lactic Acid No results found for: LAC  No results for input(s): LAC in the last 72 hours.      Liver Enzymes Protein, total   Date Value Ref Range Status   05/27/2022 7.5 6.4 - 8.2 g/dL Final     Albumin   Date Value Ref Range Status   05/27/2022 3.7 3.4 - 5.0 g/dL Final     Globulin   Date Value Ref Range Status   05/27/2022 3.8 2.0 - 4.0 g/dL Final     A-G Ratio   Date Value Ref Range Status   05/27/2022 1.0 0.8 - 1.7   Final     Alk. phosphatase   Date Value Ref Range Status   05/27/2022 66 45 - 117 U/L Final     No results for input(s): TP, ALB, GLOB, AGRAT, AP, TBIL in the last 72 hours. No lab exists for component: SGOT, GPT, DBIL     CBC w/Diff No results for input(s): WBC, RBC, HGB, HCT, PLT, GRANS, LYMPH, EOS, HGBEXT, HCTEXT, PLTEXT, HGBEXT, HCTEXT, PLTEXT in the last 72 hours. Cardiac Enzymes No results found for: CPK, CK, CKMMB, CKMB, RCK3, CKMBT, CKNDX, CKND1, ANGEL, TROPT, TROIQ, KATY, TROPT, TNIPOC, BNP, BNPP     BNP No results found for: BNP, BNPP, XBNPT     Coagulation No results for input(s): PTP, INR, APTT, INREXT, INREXT in the last 72 hours. Thyroid  Lab Results   Component Value Date/Time    T4, Total 8.4 10/15/2013 04:15 PM    TSH 0.51 04/22/2010 08:40 PM          Lipid Panel Lab Results   Component Value Date/Time    Cholesterol, total 363 (H) 04/23/2010 07:25 AM    HDL Cholesterol 42 04/23/2010 07:25 AM    LDL, calculated 279 (H) 04/23/2010 07:25 AM    VLDL, calculated 42 04/23/2010 07:25 AM    Triglyceride 210 (H) 04/23/2010 07:25 AM    CHOL/HDL Ratio 8.6 (H) 04/23/2010 07:25 AM        ABG No results for input(s): PHI, PHI, POC2, PCO2I, PO2, PO2I, HCO3, HCO3I, FIO2, FIO2I in the last 72 hours.      Urinalysis Lab Results   Component Value Date/Time    Color YELLOW 10/15/2013 04:15 PM    Appearance CLEAR 10/15/2013 04:15 PM    Specific gravity 1.010 10/15/2013 04:15 PM    pH (UA) 7.0 10/15/2013 04:15 PM    Protein NEGATIVE 10/15/2013 04:15 PM    Glucose NEGATIVE 10/15/2013 04:15 PM    Ketone NEGATIVE 10/15/2013 04:15 PM    Bilirubin NEGATIVE 10/15/2013 04:15 PM    Urobilinogen 0.2 10/15/2013 04:15 PM    Nitrites NEGATIVE 10/15/2013 04:15 PM    Leukocyte Esterase NEGATIVE 10/15/2013 04:15 PM        Micro  Recent Labs     05/30/22  1400   CULT CULTURE IN PROGRESS,FURTHER UPDATES TO FOLLOW     Recent Labs     05/30/22  1400   CULT CULTURE IN PROGRESS,FURTHER UPDATES TO FOLLOW        XR (Most Recent). CXR reviewed by me and compared with previous CXR Results from Hospital Encounter encounter on 05/27/22    XR CHEST PORT    Narrative  EXAMINATION: Chest single view    INDICATION: Left chest tube removal    COMPARISON: 5/31/2022 3 hours prior    FINDINGS: Single frontal view the chest obtained. Mediastinal silhouette normal  in size. Bandlike patchy consolidation in the lower lungs. Mild perihilar  interstitial prominence. No definite pneumothorax. Impression  Patchy bandlike consolidation in the lower lungs and evidence of interstitial  infiltrate/edema similar to prior. No definite pneumothorax. CT (Most Recent) Results from Hospital Encounter encounter on 05/27/22    CT THORACENTESIS INSRT CHEST TUBE    Narrative  PREOPERATIVE DIAGNOSIS: Large left pneumothorax. POSTOPERATIVE DIAGNOSIS: Same    INDICATION: 45years old male status post left lung biopsy with latent  development of large left asymptomatic pneumothorax. Patient is here for  CT-guided chest tube placement. ATTENDING: Dr. Aaron Bonilla M.D.    Aditya Timi: None. PROCEDURES: CT-guided left chest tube placement. ANESTHESIA: Local 1% lidocaine as well as moderate intravenous sedation with  fentanyl given and monitored per independently trained interventional radiology  nurse under my direct supervision for 35 minutes. Please see detailed nursing  record for medication dosing. CONTRAST: None. COMPLICATIONS: None    DRAIN: 24 [place] Thal-Quick left chest tube. CATHETER: None. EBL: Minimal.    SPECIMEN: None.     TECHNIQUE:    All CT scans at this facility are performed using dose optimization technique as  appropriate to a performed exam, to include automated exposure control,  adjustment of the mA and/or kV according to patient size (including appropriate  matching for site-specific examinations), or use of iterative reconstruction  technique. The risks, benefits, and alternatives were discussed. Written and verbal consent  obtained. Patient was taken to the CT room and placed supine on the CT table.  view was obtained. Left lower anterior hemithorax was prepped and draped  in the usual sterile fashion. Maximum sterile barrier technique was used. 1%  local lidocaine was infused and a scan and deep subcutaneous tissues overlying  the entry site. Under direct CT fluoroscopic guidance with an assistance of  18-gauge trocar needle a J-wire left lower chest was cannulated without  difficulty. Left anterior approach. Serial dilatations were performed. 25 [place]  chest tube was advanced over wire and positioned in the left superior and  anterior pleural space. Tube was attached to [facility] to wall suction. External  tubing was affixed to the skin with 0 Prolene. Sterile dressing was applied. There were no immediate complications. Patient tolerated procedure fairly well. Patient was transferred to recovery in stable condition. Dr. Maico James was  present and performed the procedure. FINDINGS: Initial imaging of the thorax demonstrated complex left pleural  effusion without pneumothorax. CT fluoroscopic guidance demonstrated good  position of the access needle as well as a new 24 [place] Thal-Quick chest tube. Impression  1. Successful uncomplicated CT-guided left chest tube placement. EKG No results found for this or any previous visit. ECHO No results found for this or any previous visit. PFT No flowsheet data found. Other ASA reactivity:   Pre-albumin:   Ionized Calcium:   NH4:   T3, FT4:  Cortisol:  Urine Osm:  Urine Lytes:   HbA1c:      No results found for this or any previous visit (from the past 24 hour(s)).       Telemetry:normal sinus rhythm    The patient is: [x] acutely ill Risk of deterioration: [x] moderate    [] critically ill  [] high [x]See my orders for details    My assessment, plan of care, findings, medications, side effects etc were discussed with:  [x] Nurse [] PT/OT    [] Respiratory therapy [x] AURELIANO Israel   [] Family: answered all questions to satisfaction [x] Patient: answered all questions to satisfaction   [] Pharmacist []      [x] Total critical care time exclusive of procedures 40 minutes with complex decision making, coordination of care and counseling patient performed and > 50% time spent in face to face evaluation. Marylen Blacksmith MD/MPH     Pulmonary, Critical Care Medicine  [facility]        Addendum:  Reviewed post chest tube removal CXR showing resolution of PTX.   I discussed with pt, ok to be discharged from a pulmonary perspective    Marylen Blacksmith MD/MPH     Pulmonary, [facility] [address] [facility]

## 2022-06-02 ENCOUNTER — OFFICE VISIT (OUTPATIENT)
Dept: ONCOLOGY | Age: 52
End: 2022-06-02
Payer: COMMERCIAL

## 2022-06-02 VITALS
HEART RATE: 77 BPM | DIASTOLIC BLOOD PRESSURE: 77 MMHG | WEIGHT: 278 LBS | OXYGEN SATURATION: 95 % | SYSTOLIC BLOOD PRESSURE: 128 MMHG | HEIGHT: 72 IN | BODY MASS INDEX: 37.65 KG/M2 | TEMPERATURE: 98.4 F

## 2022-06-02 DIAGNOSIS — C81.14 NODULAR SCLEROSIS HODGKIN LYMPHOMA OF LYMPH NODES OF AXILLA (HCC): ICD-10-CM

## 2022-06-02 DIAGNOSIS — F17.200 TOBACCO DEPENDENCE: ICD-10-CM

## 2022-06-02 DIAGNOSIS — R59.0 CERVICAL ADENOPATHY: ICD-10-CM

## 2022-06-02 DIAGNOSIS — R91.8 LUNG MASS: Primary | ICD-10-CM

## 2022-06-02 PROCEDURE — 99214 OFFICE O/P EST MOD 30 MIN: CPT | Performed by: INTERNAL MEDICINE

## 2022-06-02 NOTE — PROGRESS NOTES
Hematology/Oncology Note      Date: 2022    Name: Florinda Hernandez  : 1970        Dakota Wing MD         Subjective:     Chief complaint: Lymphadenopathy    History of Present Illness:   Mr. Eula Guardado is 45y.o. year old male who was seen for management of Lymphadenopathy. 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 He was status post 6 cycles of ABVD chemotherapy, last treatment was on 2008. S.p Right axillary XRT. Last PET scan was in . He reported he was last seen by Oncology  about 3 years ago and has now change to a new practice . Today the patient is in the office for follow up of care. He was accompanied by his wife. He reported feeling better since discharged. He has occasional cough from chronic smoking. Denied worsening chest pain or SOB. He denied fever, chills, night sweat, unintentional weight loss, skin lumps or bumps, acute bleeding or bruising issues. Denied headache, acute vision change, dizziness, chest pain, worsen shortness of breath, palpitation, productive cough, nausea, vomiting, abdominal pain, altered bowel habits, dysuria, worsen bone pain or back pain, new focal numbness or weakness. 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   Social History     Socioeconomic History    Marital status:      Spouse name: Not on file    Number of children: Not on file    Years of education: Not on file    Highest education level: Not on file   Occupational History    Not on file   Tobacco Use    Smoking status: Current Every Day Smoker     Packs/day: 1.00    Smokeless tobacco: Never Used   Substance and Sexual Activity    Alcohol use: Yes     Comment: social    Drug use: Yes     Types: Marijuana     Comment: occasional    Sexual activity: Not on file   Other Topics Concern    Not on file   Social History Narrative    Not on file     Social Determinants of Health     Financial Resource Strain:     Difficulty of Paying Living Expenses: Not on file   Food Insecurity:     Worried About Running Out of Food in the Last Year: Not on file    Iza of Food in the Last Year: Not on file   Transportation Needs:     Lack of Transportation (Medical): Not on file    Lack of Transportation (Non-Medical): Not on file   Physical Activity:     Days of Exercise per Week: Not on file    Minutes of Exercise per Session: Not on file   Stress:     Feeling of Stress : Not on file   Social Connections:     Frequency of Communication with Friends and Family: Not on file    Frequency of Social Gatherings with Friends and Family: Not on file    Attends Islam Services: Not on file    Active Member of [address] [place] [address] or Organizations: Not on file    Attends Club or Organization Meetings: Not on file    Marital Status: Not on file   Intimate Partner Violence:     Fear of Current or Ex-Partner: Not on file    Emotionally Abused: Not on file    Physically Abused: Not on file    Sexually Abused: Not on file   Housing Stability:     Unable to Pay for Housing in the Last Year: Not on file    Number of [place] in the Last Year: Not on file    Unstable Housing in the Last Year: Not on file     No family history on file. Review of Systems   Constitutional: Negative for chills, diaphoresis, fever, malaise/fatigue and weight loss. Respiratory: Positive for cough. Negative for hemoptysis, shortness of breath and wheezing. Cardiovascular: Negative for chest pain, palpitations and leg swelling. Gastrointestinal: Negative for abdominal pain, diarrhea, heartburn, nausea and vomiting. Genitourinary: Negative for dysuria, frequency, hematuria and urgency. Musculoskeletal: Negative for joint pain and myalgias. Skin: Negative for itching and rash. Neurological: Negative for dizziness, seizures, weakness and headaches. Psychiatric/Behavioral: Negative for depression. The patient does not have insomnia. Objective:     Visit Vitals  /77   Pulse 77   Temp 98.4 °F (36.9 °C)   Ht 6' (1.829 m)   Wt 126.1 kg (278 lb)   SpO2 95%   BMI 37.70 kg/m²       ECOG Performance Status (grade): 0  0 - able to carry on all pre-disease activity w/out restriction  1 - restricted but able to carry out light work  2 - ambulatory and can self- care but unable to carry out work  3 - bed or chair >50% of waking hours  4 - completely disable, total care, confined to bed or chair    Physical Exam  Constitutional:       General: He is not in acute distress. Appearance: Normal appearance. Eyes:      Pupils: Pupils are equal, round, and reactive to light. Cardiovascular:      Rate and Rhythm: Normal rate. Pulses: Normal pulses. Heart sounds: Normal heart sounds. No murmur heard. Pulmonary:      Effort: Pulmonary effort is normal. No respiratory distress. Breath sounds: Normal breath sounds. Abdominal:      General: There is no distension. Palpations: Abdomen is soft. There is no mass. Tenderness: There is no abdominal tenderness. There is no guarding. Musculoskeletal:         General: No swelling or tenderness. Cervical back: Neck supple. No rigidity. Lymphadenopathy:      Cervical: No cervical adenopathy. Skin:     Findings: No rash. Neurological:      Mental Status: He is alert and oriented to person, place, and time. Mental status is at baseline. Cranial Nerves: No cranial nerve deficit.    Psychiatric:         Mood and Affect: Mood normal.          Diagnostics:      Recent Results (from the past 96 hour(s))   CULTURE, WOUND W GRAM STAIN Collection Time: 05/30/22  2:00 PM    Specimen: Pilonidal Cyst; Wound   Result Value Ref Range    Special Requests: NO SPECIAL REQUESTS      GRAM STAIN RARE WBCS SEEN      GRAM STAIN NO ORGANISMS SEEN      Culture result: MODERATE MIXED SKIN KRISTA ISOLATED         Imaging:  No results found for this or any previous visit. Results for orders placed during the hospital encounter of 05/27/22    XR CHEST PORT    Narrative  EXAMINATION: Chest single view    INDICATION: Left chest tube removal    COMPARISON: 5/31/2022 3 hours prior    FINDINGS: Single frontal view the chest obtained. Mediastinal silhouette normal  in size. Bandlike patchy consolidation in the lower lungs. Mild perihilar  interstitial prominence. No definite pneumothorax. Impression  Patchy bandlike consolidation in the lower lungs and evidence of interstitial  infiltrate/edema similar to prior. No definite pneumothorax. Results for orders placed during the hospital encounter of 05/27/22    CT THORACENTESIS INSRT CHEST TUBE    Narrative  PREOPERATIVE DIAGNOSIS: Large left pneumothorax. POSTOPERATIVE DIAGNOSIS: Same    INDICATION: 45years old male status post left lung biopsy with latent  development of large left asymptomatic pneumothorax. Patient is here for  CT-guided chest tube placement. ATTENDING: Dr. Josie Harris M.D.    Srinivasa Trotter: None. PROCEDURES: CT-guided left chest tube placement. ANESTHESIA: Local 1% lidocaine as well as moderate intravenous sedation with  fentanyl given and monitored per independently trained interventional radiology  nurse under my direct supervision for 35 minutes. Please see detailed nursing  record for medication dosing. CONTRAST: None. COMPLICATIONS: None    DRAIN: 24 Malawian Thal-Quick left chest tube. CATHETER: None. EBL: Minimal.    SPECIMEN: None.     TECHNIQUE:    All CT scans at this facility are performed using dose optimization technique as  appropriate to a performed exam, to include automated exposure control,  adjustment of the mA and/or kV according to patient size (including appropriate  matching for site-specific examinations), or use of iterative reconstruction  technique. The risks, benefits, and alternatives were discussed. Written and verbal consent  obtained. Patient was taken to the CT room and placed supine on the CT table.  view was obtained. Left lower anterior hemithorax was prepped and draped  in the usual sterile fashion. Maximum sterile barrier technique was used. 1%  local lidocaine was infused and a scan and deep subcutaneous tissues overlying  the entry site. Under direct CT fluoroscopic guidance with an assistance of  18-gauge trocar needle a J-wire left lower chest was cannulated without  difficulty. Left anterior approach. Serial dilatations were performed. 25 Bulgarian  chest tube was advanced over wire and positioned in the left superior and  anterior pleural space. Tube was attached to [facility] to wall suction. External  tubing was affixed to the skin with 0 Prolene. Sterile dressing was applied. There were no immediate complications. Patient tolerated procedure fairly well. Patient was transferred to recovery in stable condition. Dr. Kavin Oro was  present and performed the procedure. FINDINGS: Initial imaging of the thorax demonstrated complex left pleural  effusion without pneumothorax. CT fluoroscopic guidance demonstrated good  position of the access needle as well as a new 24 Bulgarian Thal-Quick chest tube. Impression  1. Successful uncomplicated CT-guided left chest tube placement. Assessment:                                        1. Lung mass    2. 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 Cervical adenopathy    4. 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    # Tobacco dependence  -- Status post 6 cycles of ABVD chemotherapy, last treatment was on 1/2/2008. S.p XRT right axilary  Last PET scan was in 2014.  -- 5/3/2021 Cervical MRI reported that the cervical adenopathy identified. There appears to be prominence of the left cervical lymph node chain suggesting adenopathy in this 4 and 5 would defer however to patient's planned clinical follow-up for Hodgkin's lymphoma such as PET/CT. --  7/23/2021 PET scan reported no finding for suspicious lymph nodes for recurrent lymphoma in the neck or chest. Mildly enlarged right common iliac chain lymph nodes with moderate metabolic activity [place] 3. These lymph nodes are noted to be new from more remote PET of 2013. Continue close imaging surveillance recommended. -- 7/15/2021 Flow cytometry: No evidence of non-Hodgkin lymphoma or acute leukemia   Unremarkable LDH and CRP. -- 11/2/2021 CT reported subcentimeter right common iliac nodes without significant change in size since recent PET/CT.  + Mildly enlarged bilateral external iliac chain nodes and enlarged portacaval  node also similar to PET/CT on 7/15/2021. Recommend attention on follow-up imaging.  -- Clinical stable, asymptomatic. No B symptoms reported. -- 4/29/2022 CT CAP reported an iInterval development of multiple pulmonary masses, findings are concerning for neoplastic process.   + No significant interval change in size of previously described portacaval lymph nodes and bilateral external iliac chain lymph nodes. + Mild splenomegaly. -- He has heavy smoking history, more than 1PPD for about 35 years. -- 5/27/2022 PET scan reported multiple lung lesions showing abnormal increased activity and worrisome for malignancy, possibly lymphoma, not typical time course for bronchogenic carcinoma: right middle lobe, right lower lobe, and biopsied lesion left lower lobe. + No malignant activity at multiple scattered new subcentimeter pulmonary nodules.   + Mildly enlarged lymph nodes at left neck, portacaval, right groin without malignant activity. -- 5/26/2022 The patient underwent biopsy of left lower lung mass. He returned to the ED 5/27/2022 with acute onset of chest pain and increased dyspnea and noted to have a PTX. He reported his breathing is improving after discharged. 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 Nilsa Diza who is seeing the patient on 6/15/2022. The patient may need re-biopsy/ EBUS. -- The patient will f/u Pulmonology as directed. -- I will see the patient back in clinic in about 4 weeks . Always sooner if required. No orders of the defined types were placed in this encounter. Mr. Brad Stevens has a reminder for a \"due or due soon\" health maintenance. I have asked that he contact his primary care provider for follow-up on this health maintenance. All of patient's questions answered to their apparent satisfaction. They verbally show understanding and agreement with aforementioned plan. Fredrick Allison MD  6/2/2022          Parts of this document has been produced using Dragon dictation system. Unrecognized errors in transcription may be present. Please do not hesitate to reach out for any questions or clarifications.       CC: Blanca Johnson MD; Lissa Joshi MD

## 2022-06-09 ENCOUNTER — HOSPITAL ENCOUNTER (OUTPATIENT)
Dept: RESPIRATORY THERAPY | Age: 52
Discharge: HOME | End: 2022-06-09
Attending: INTERNAL MEDICINE
Payer: COMMERCIAL

## 2022-06-09 DIAGNOSIS — J44.9 COPD, MILD (HCC): ICD-10-CM

## 2022-06-09 PROCEDURE — 94010 BREATHING CAPACITY TEST: CPT

## 2022-06-09 PROCEDURE — 94726 PLETHYSMOGRAPHY LUNG VOLUMES: CPT

## 2022-06-09 PROCEDURE — 94729 DIFFUSING CAPACITY: CPT

## 2022-06-15 ENCOUNTER — OFFICE VISIT (OUTPATIENT)
Dept: PULMONOLOGY | Age: 52
End: 2022-06-15
Payer: COMMERCIAL

## 2022-06-15 VITALS
HEIGHT: 72 IN | TEMPERATURE: 97.8 F | WEIGHT: 280.8 LBS | DIASTOLIC BLOOD PRESSURE: 86 MMHG | RESPIRATION RATE: 16 BRPM | BODY MASS INDEX: 38.03 KG/M2 | HEART RATE: 68 BPM | SYSTOLIC BLOOD PRESSURE: 167 MMHG | OXYGEN SATURATION: 99 %

## 2022-06-15 DIAGNOSIS — R91.8 LUNG MASS: Primary | ICD-10-CM

## 2022-06-15 DIAGNOSIS — J44.9 COPD, MILD (HCC): ICD-10-CM

## 2022-06-15 DIAGNOSIS — Z87.891 PERSONAL HISTORY OF TOBACCO USE, PRESENTING HAZARDS TO HEALTH: ICD-10-CM

## 2022-06-15 PROCEDURE — 99215 OFFICE O/P EST HI 40 MIN: CPT | Performed by: INTERNAL MEDICINE

## 2022-06-15 NOTE — LETTER
6/28/2022    Patient: Param Smith   YOB: 1970   Date of Visit: 6/15/2022     Rajani Jonas MD  [address] [facility]  [address]  [address] [facility]  Via In MD [place]  [address] [place] [id number]  Via Fax: [address] [facility], [place]  [address] [place] [id number]  Via Fax: [phone]      Dear MD Nora Hannon MD Dava Solomon, MD,    Thank you for referring Mr. Oswald Chen to [address] [place] [address] for evaluation. My notes for this consultation are attached. If you have questions, please do not hesitate to call me. I look forward to following your patient along with you.       Sincerely,    Melissa Ramos MD/MPH     Pulmonary, Critical Care Medicine  [facility]

## 2022-06-15 NOTE — PROGRESS NOTES
[address] [place] [address] [facility]  Pulmonary, Critical Care, and Sleep Medicine    Pulmonary Office F/U  Name: Zane Louise 45 y.o. male  MRN: [id number]  : 1970  Service Date: 06/15/22  Chief Complaint:   Chief Complaint   Patient presents with   [facility] Follow Up     from [facility] on -2022 or pneumothorax of left lung after biopsy    Results     CT guided biopsy (L) lung 2022, PET/CT 2022, thoracentesis 2022, PFT 2022       History of Present Illness:  Zane Louise is a 45 y.o. male, who presents to Pulmonary clinic referred for hospital discharge followup for PTX. Pt was admitted to [facility] from  through 2022 for delayed pneumothorax post CT-guided lung biopsy on 2022. In the interval, patient reports he is doing better. He reports that his cough decreased some since starting Trelegy inhaler. They also report that patient quit smoking since hospitalization. Patient still has significant cravings. They report that the patient has not gone back to work, but has good exercise tolerance now. With regards to his PAP therapy, patient is using his old CPAP - saw Dr. Brain Marcos previously. Patient reports he was on BiPAP until the beginning of the year when his insurance changed and then it was remotely shut off by his [employer] company. He then went back to using his CPAP - he wasn't able to followup with sleep medicine due to his schedule given his wife's health at the time. 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 No pertinent surgical history.     Family History   Problem Relation Age of Onset    Cancer Paternal Uncle     Diabetes Maternal Grandmother     Cancer Paternal Grandfather      Social History     Socioeconomic History    Marital status:      Spouse name: Not on file    Number of children: Not on file    Years of education: Not on file    Highest education level: Not on file   Occupational History    Not on file   Tobacco Use    Smoking status: Former Smoker     Packs/day: 1.00     Years: 35.00     Pack years: 35.00    Smokeless tobacco: Never Used   Vaping Use    Vaping Use: Never used   Substance and Sexual Activity    Alcohol use: Yes     Comment: social    Drug use: Yes     Types: Marijuana     Comment: occasional    Sexual activity: Not on file   Other Topics Concern    Not on file   Social History Narrative    Not on file     Social Determinants of Health     Financial Resource Strain:     Difficulty of Paying Living Expenses: Not on file   Food Insecurity:     Worried About Running Out of Food in the Last Year: Not on file    Iza of Food in the Last Year: Not on file   Transportation Needs:     Lack of Transportation (Medical): Not on file    Lack of Transportation (Non-Medical):  Not on file   Physical Activity:     Days of Exercise per Week: Not on file    Minutes of Exercise per Session: Not on file   Stress:     Feeling of Stress : Not on file   Social Connections:     Frequency of Communication with Friends and Family: Not on file    Frequency of Social Gatherings with Friends and Family: Not on file    Attends Anabaptist Services: Not on file    Active Member of Clubs or Organizations: Not on file    Attends Club or Organization Meetings: Not on file    Marital Status: Not on file   Intimate Partner Violence:     Fear of Current or Ex-Partner: Not on file    Emotionally Abused: Not on file    Physically Abused: Not on file    Sexually Abused: Not on file   Housing Stability:     Unable to Pay for Housing in the Last Year: Not on file    Number of [place] in the Last Year: Not on file    Unstable Housing in the Last Year: Not on file     No Known Allergies    Prior to Admission medications    Medication Sig Start Date End Date Taking? Authorizing Provider   albuterol sulfate (Proair Digihaler) 90 mcg/actuation aebs Take 1 Puff by inhalation every four (4) hours as needed for Wheezing, Shortness of Breath or Cough. Yes Provider, Historical   fluticasone-umeclidin-vilanter (Trelegy Ellipta) 200-62.5-25 mcg inhaler Take 1 Puff by inhalation daily. Rinse and gargle after each use 5/30/22  Yes Ncik Weber MD   buPROPion XL (WELLBUTRIN XL) 150 mg tablet Take 150 mg by mouth daily. Indications: stop smoking   Yes Provider, Historical   cyanocobalamin (Vitamin B-12) 500 mcg tablet Take 500 mcg by mouth daily. Yes Provider, Historical   cholecalciferol, vitamin D3, 50 mcg (2,000 unit) tab Take 1 Tablet by mouth daily. Yes Provider, Historical   ascorbic acid, vitamin C, (Vitamin C) 500 mg tablet Take 1,000 mg by mouth daily. Yes Provider, Historical   losartan (COZAAR) 50 mg tablet Take 50 mg by mouth daily. 4/12/21  Yes Provider, Historical   gabapentin (NEURONTIN) 300 mg capsule Take 300 mg by mouth daily. 6/7/21  Yes Provider, Historical       Immunizations:  I have reviewed the patient's immunizations  Immunization History   Administered Date(s) Administered    COVID-19, J&J, PF, 0.5 mL Dose 06/07/2021       Review of Systems:  A complete review of systems was performed as stated in the HPI, all others are negative.       Objective:    Physical Exam:  BP (!) 167/86 (BP 1 Location: Right upper arm, BP Patient Position: Sitting, BP Cuff Size: Adult)   Pulse 68   Temp 97.8 °F (36.6 °C) (Temporal)   Resp 16   Ht 6' (1.829 m)   Wt 127.4 kg (280 lb 12.8 oz)   SpO2 99%   BMI 38.08 kg/m²   Vitals were personally reviewed  Gen: no acute distress, pleasant and cooperative, sitting up in chair, ambulates without difficulty  HEENT: normocephalic/atraumatic, no ocular drainage, EOMI, no scleral icterus, nasal bridge midline, unable to assess nasal and oral cavities due to patient wearing mask in the setting of COVID-19 pandemic  Neck: supple, trachea midline, no JVD  Lungs: good air entry B/L, CTABL, no wheezes/rales/rhonchi  Psych: normal memory, thought content, and processing    Labs: I have reviewed the patient's available labs  Lab Results   Component Value Date/Time    WBC 9.4 05/28/2022 05:54 AM    HGB 14.1 05/28/2022 05:54 AM    HCT 42.1 05/28/2022 05:54 AM    PLATELET 604 95/40/7167 05:54 AM    MCV 85.1 05/28/2022 05:54 AM     Lab Results   Component Value Date/Time    Sodium 137 05/28/2022 05:54 AM    Potassium 4.0 05/28/2022 05:54 AM    Chloride 104 05/28/2022 05:54 AM    CO2 29 05/28/2022 05:54 AM    Anion gap 4 05/28/2022 05:54 AM    Glucose 104 (H) 05/28/2022 05:54 AM    BUN 9 05/28/2022 05:54 AM    Creatinine 0.82 05/28/2022 05:54 AM    BUN/Creatinine ratio 11 (L) 05/28/2022 05:54 AM    GFR est AA >60 05/28/2022 05:54 AM    GFR est non-AA >60 05/28/2022 05:54 AM    Calcium 9.6 05/28/2022 05:54 AM    Bilirubin, total 0.6 05/27/2022 04:00 PM    Alk. phosphatase 66 05/27/2022 04:00 PM    Protein, total 7.5 05/27/2022 04:00 PM    Albumin 3.7 05/27/2022 04:00 PM    Globulin 3.8 05/27/2022 04:00 PM    A-G Ratio 1.0 05/27/2022 04:00 PM    ALT (SGPT) 31 05/27/2022 04:00 PM    AST (SGOT) 16 05/27/2022 04:00 PM     Pathology report reviewed in today's clinic: CT-guided lung biopsy from 5/26/2022 shows noncaseating granulomatous inflammation. Imaging:  I have personally reviewed patient's imaging as follows: PET/CT scan from 5/27/2022 shows bilateral lung masses and right middle lobe, left lower lobe, right lower lobe all with hypermetabolic activity.   PET Results (most recent):  Results from Orders Only encounter on 05/13/22    PET/CT TUMOR IMAGE SKULL THIGH (SUB)    Narrative  POSITRON EMISSION TOMOGRAPHY (WITH NONCONTRAST CT)      HISTORY: Nodular sclerosis Hodgkin's lymphoma of lymph nodes at the axilla. Chemotherapy last received in 2008. Cervical adenopathy. Also current smoker  with lung mass status post left lower lobe biopsy on 5/26/2022. INDICATION: Subsequent treatment (restaging or monitoring)    REFERENCES: 7/23/2021 PET/CT. Prior diagnostic chest CT on 4/29/2022, chest  x-rays yesterday. TECHNIQUE: Following i.v. administration of 8.0 mCi 18F-fluorodeoxyglucose  (FDG), PET was performed spanning from the skull base to the mid thighs. CT was  performed just prior to PET, after administration of oral contrast only, to  provide attenuation correction and anatomic localization for the PET. CT images  acquired with PET are not standard diagnostic CT, limited by lack of i.v.  contrast, reduced mAs (x-rays), and slower imaging during respiration (to  improve fusion with PET images). All CT scans at this facility are performed  using dose optimization techniques as appropriate to a performed exam, to  include automated exposure control, adjustment of the mA and/or kV according to  patient's size (including appropriate matching for site specific examinations),  or use of iterative reconstruction technique. Serum glucose =  82 mg/dL    FINDINGS:    PET images:    Mediastinal blood pool reference value =  2.6    SUV Max. Mediastinal blood pool reference value =  1.6    SUV Mean. Liver parenchyma reference value =  3.3   SUV Max. Liver parenchyma reference value =  2.9    SUV Mean. NECK: No suspicious hypermetabolic activity at the neck. Small lymph nodes in  the left, largest a left level 3 lymph node measuring 9 mm (44) and is without  hypermetabolic activity. CHEST: Nodular density superiorly right middle lobe adjacent to the minor  fissure has lobulated and fairly well-defined borders as before. It demonstrates  increased metabolic activity over background with Max SUV 4.8.     Nodular density left lower lobe posteriorly biopsied yesterday not as well seen,  partially obscured by surrounding airspace disease, abutting the pleura  posteriorly. Max SUV 5.1. New since 11/2/2021. Nodular density in the right lung base adjacent to pleura shows Max SUV 5.4  (128). New since 11/2/2021. There are also several new 3 to 4 mm nodules right lower lobe medially and  centrally, right middle lobe inferiorly, lingula posteriorly, left lower lobe  anteriorly without malignant activity. No recurrent axillary adenopathy. ABDOMEN: No suspicious hypermetabolic activity. 2 cm portacaval lymph node (151)  without hypermetabolic activity. Max SUV 2.3. Stable since 11/2/2021. Activity at the spleen is less than that of the liver and without focal  abnormalities. PELVIS: Largest groin lymph node  is on the right and measures 13 mm with Max  SUV 1.7. Stable since 11/2/2021. No lymph nodes demonstrate malignant activity. Focal skin thickening at the medial right thigh just below the perineum shows  increased activity with Max SUV 4.1 (284). Additional CT findings: Cervical fusion. There is new 50% pneumothorax on the  left. No inversion of the diaphragm, shift of the mediastinum. Liver overall low  attenuation. Lumbar fusion. Hypertrophic changes both hips. Impression  Multiple lung lesions showing abnormal increased activity and worrisome for  malignancy, possibly lymphoma, not typical time course for bronchogenic  carcinoma:  -right middle lobe,  -right lower lobe, and  -biopsied lesion left lower lobe. No malignant activity at multiple scattered new subcentimeter pulmonary nodules. Potential for false negative result exists due to their small size and attention  should be paid to these regions on subsequent examinations. Mildly enlarged lymph nodes at left neck, portacaval, right groin without  malignant activity.     Focal activity medial right thigh probably skin lesion like folliculitis or  focal cellulitis or sebaceous cyst. Correlate with physical examination. Moderately large new left-sided pneumothorax, one day status post lung biopsy. D/w Dr. Rachna Walton at [facility] Interventional Radiology suite. PFTs:  I have reviewed the patient's PFTs as follows from 6/9/2022: Spirometry, lung volumes, diffusion capacity are all normal    TTE:  I have reviewed the patient's TTE results  No results found for this or any previous visit. Assessment and Plan:  45 y.o. male with:    Impression:  1. Bilateral lung masses: Seen on CT chest abdomen pelvis from 4/29/2021. Patient has multiple lung masses which are multilobulated, right middle lobe 6.1 x 5.7 x 1.4 cm, as well as left lower lobe 4.3 x 2.7 x 2.8 cm mass as well as right lower lobe costophrenic sulcus mass 4.5 x 1.2 x 1.9 cm in size. PET/CT from 5/27/22 shows hypermetabolic activity with SUV of 4.8 in RML, RLL mass has SUV of 5.4, and LLL has SUV of 5.1. Pt underwent CT guided lung biopsy on 5/26/22 of LLL lung mass which showed non-caseating granulomatous inflammation  2. Left sided PTX s/p biopsy:  Required chest tube thoracostomy, now resolved  3. Mild COPD:  PFTs show no airflow obstruction, but pt has chronic cough which resolved with bronchodilators  4. Hx of tobacco use:  1PPD smoker for 35 years, quit during hospitalization  5. Hx of sclerosing Hodgkin's Lymphoma:  Treated with chemo in 2008, follows with Oncology - Dr. Kathleen Koehler currently    Plan:  -I spent extensive time discussing case with the patient and his wife at bedside and also I spoke with the pt's current Oncologist - Dr. Kathleen Koehler, given the above. Patient has multiple lesions on his PET/CT scan, and given relatively rapid appearance on imaging compared to previous years, patient's CT-guided lung biopsy may represent false negative result.   Dr. Kathleen Koehler and I are in agreement that the patient needs additional tissue, and unfortunately recurrence of lymphoma remains on the differential.  Although noncaseating granulomatous inflammation can be benign, this also is present with malignancy as well. I offered repeat CT-guided biopsy versus surgical lung biopsy. Given higher false-negative rate with core needle biopsy in the setting of lymphoma, patient was eventually agreeable to referral to CT surgery for surgical lung biopsy of right lung hypermetabolic lesion.  -I will refer the patient to CT surgery - Dr. Lewis Lan at [facility] for surgical lung biopsy and possible flow cytometry of lesion  -Continue Trelegy Ellipta 200mcg 1 puff once daily. Counseled pt to rinse mouth thoroughly after each use  -Continue Albuterol HFA 1-2puffs q4-6h PRN. Counseled patient that this is their rescue inhaler and to carry with them at all times.  -Counseled patient on proper inhaler technique  -Advised to remain active  -Immunizations reviewed, COVID vaccination x1  -Counseled patient regarding lifestyle precautions in COVID-19 pandemic including wearing mask in public and confined spaces, social/physical distancing, frequent hand hygiene, etc.  Stephanie Paci pt to receive COVID-19 booster when possible    Follow-up and Dispositions    · Return in about 6 weeks (around 7/27/2022). Orders Placed This Encounter    REFERRAL TO [address] [place] [address]       This patient has a high complexity chronic care condition   This Visit needed High complexity medically necessary decision making and management plans. Time spent in preparing for the visit-review of history, tests done prior to arrival, additional time reviewing clinical data, imaging, outside records and test results as well as time spent in ordering tests, treatments and referring patient for further care was a total of 38 minutes. Additional time-counseling with patient and family members regarding care plan 28 minutes.   Total aggregate time was 64 minutes      Dre Sinclair MD/MPH     Pulmonary, Critical Care Medicine  [facility]

## 2022-06-15 NOTE — PROGRESS NOTES
Alicejoseph Ren presents today for   Chief Complaint   Patient presents with   [facility] Follow Up     from [address] on 5/27-5/31/2022 or pneumothorax of left lung after biopsy    Results     CT guided biopsy (L) lung 5/26/2022, PET/CT 5/27/2022, thoracentesis 5/27/2022, PFT 6/9/2022       Is someone accompanying this pt? Yes. Family member    Is the patient using any DME equipment during [address]? Yes. CPAP machine   -DME Company N/A    Depression Screening:  3 most recent PHQ Screens 6/15/2022   Little interest or pleasure in doing things Not at all   Feeling down, depressed, irritable, or hopeless Not at all   Total Score PHQ 2 0       Learning Assessment:  Learning Assessment 6/15/2022   PRIMARY LEARNER Patient   PRIMARY LANGUAGE ENGLISH   LEARNER PREFERENCE PRIMARY DEMONSTRATION     LISTENING     PICTURES     VIDEOS     READING   ANSWERED BY Patient   RELATIONSHIP SELF       Abuse Screening:  No flowsheet data found. Fall Risk  No flowsheet data found. Coordination of Care:  1. Have you been to the ER, urgent care clinic since your last visit? Hospitalized since your last visit? Yes; Where: [address], When: 5/26/2022-CT guided biopsy (L) lung, 5/27-5/31/2022-pneumothorax of left lung after biopsy    2. Have you seen or consulted any other health care providers outside of the [address] [place] [address] since your last visit? Include any pap smears or colon screening. Yes. Dr. Chantell Maxwell, PCP     Per patient, did not receive influenza vaccine last season.

## 2022-07-19 PROBLEM — R91.8 LUNG MASS: Status: ACTIVE | Noted: 2022-07-19

## 2022-07-19 PROBLEM — C81.14 NODULAR SCLEROSIS HODGKIN LYMPHOMA OF LYMPH NODES OF AXILLA (HCC): Status: ACTIVE | Noted: 2022-07-19

## 2023-01-11 NOTE — PERIOP NOTES
PRE-SURGICAL INSTRUCTIONS        Patient's Name:  Lalitha GRIGSBY'S Date:  1/11/2023            Covid Testing Date and Time:    Surgery Date:  1/16/2023                Do NOT eat or drink anything, including candy, gum, or ice chips after midnight on 01/15, unless you have specific instructions from your surgeon or anesthesia provider to do so. You may brush your teeth before coming to the hospital.  No smoking 24 hours prior to the day of surgery. No alcohol 24 hours prior to the day of surgery. No recreational drugs for one week prior to the day of surgery. Leave all valuables, including money/purse, at home. Remove all jewelry, nail polish, acrylic nails, and makeup (including mascara); no lotions powders, deodorant, or perfume/cologne/after shave on the skin. Follow instruction for Hibiclens washes and CHG wipes from surgeon's office. Glasses/contact lenses and dentures may be worn to the hospital.  They will be removed prior to surgery. Call your doctor if symptoms of a cold or illness develop within 24-48 hours prior to your surgery. 11.  If you are having an outpatient procedure, please make arrangements for a responsible ADULT TO [address] [place] [address] and stay with you for 24 hours after your surgery. 12. ONE VISITOR in the hospital at this time for outpatient procedures. Exceptions may be made for surgical admissions, per nursing unit guidelines      Special Instructions:      Bring list of CURRENT medications. Bring inhaler. Bring CPAP machine. Bring any pertinent legal medical records. Take these medications the morning of surgery with a sip of water:  Cozaar and use Trelegy . Follow physician instructions about stopping anticoagulants. Complete bowel prep per MD instructions. On the day of surgery, come in the main entrance of [facility]. Let the  at the desk know you are there for surgery.   A staff member will come escort you to the surgical area on the second floor. If you have any questions or concerns, please do not hesitate to call:     (Prior to the day of surgery) PAT department:  [phone]   (Day of surgery) Pre-Op department:  [phone]    These surgical instructions were reviewed with Altame Degree during the [facility] phone call.

## 2023-01-13 ENCOUNTER — ANESTHESIA EVENT (OUTPATIENT)
Dept: ENDOSCOPY | Age: 53
End: 2023-01-13
Payer: COMMERCIAL

## 2023-01-16 ENCOUNTER — ANESTHESIA (OUTPATIENT)
Dept: ENDOSCOPY | Age: 53
End: 2023-01-16
Payer: COMMERCIAL

## 2023-01-16 ENCOUNTER — HOSPITAL ENCOUNTER (OUTPATIENT)
Age: 53
Setting detail: OUTPATIENT SURGERY
Discharge: HOME | End: 2023-01-16
Attending: INTERNAL MEDICINE | Admitting: INTERNAL MEDICINE
Payer: COMMERCIAL

## 2023-01-16 VITALS
HEART RATE: 58 BPM | BODY MASS INDEX: 37.93 KG/M2 | HEIGHT: 72 IN | TEMPERATURE: 98.4 F | DIASTOLIC BLOOD PRESSURE: 76 MMHG | WEIGHT: 280 LBS | OXYGEN SATURATION: 97 % | SYSTOLIC BLOOD PRESSURE: 123 MMHG | RESPIRATION RATE: 11 BRPM

## 2023-01-16 PROCEDURE — 77030013992 HC SNR POLYP ENDOSC BSC -B: Performed by: INTERNAL MEDICINE

## 2023-01-16 PROCEDURE — 88305 TISSUE EXAM BY PATHOLOGIST: CPT

## 2023-01-16 PROCEDURE — 76060000031 HC ANESTHESIA FIRST 0.5 HR: Performed by: INTERNAL MEDICINE

## 2023-01-16 PROCEDURE — 77030008565 HC TBNG SUC IRR ERBE -B: Performed by: INTERNAL MEDICINE

## 2023-01-16 PROCEDURE — 74011250636 HC RX REV CODE- 250/636: Performed by: STUDENT IN AN ORGANIZED HEALTH CARE EDUCATION/TRAINING PROGRAM

## 2023-01-16 PROCEDURE — 76040000019: Performed by: INTERNAL MEDICINE

## 2023-01-16 PROCEDURE — 77030021593 HC FCPS BIOP ENDOSC BSC -A: Performed by: INTERNAL MEDICINE

## 2023-01-16 PROCEDURE — 74011250636 HC RX REV CODE- 250/636: Performed by: NURSE ANESTHETIST, CERTIFIED REGISTERED

## 2023-01-16 PROCEDURE — 74011250637 HC RX REV CODE- 250/637: Performed by: NURSE ANESTHETIST, CERTIFIED REGISTERED

## 2023-01-16 PROCEDURE — 2709999900 HC NON-CHARGEABLE SUPPLY: Performed by: INTERNAL MEDICINE

## 2023-01-16 PROCEDURE — 00811 ANES LWR INTST NDSC NOS: CPT | Performed by: STUDENT IN AN ORGANIZED HEALTH CARE EDUCATION/TRAINING PROGRAM

## 2023-01-16 PROCEDURE — 74011000250 HC RX REV CODE- 250: Performed by: STUDENT IN AN ORGANIZED HEALTH CARE EDUCATION/TRAINING PROGRAM

## 2023-01-16 PROCEDURE — 77030009426 HC FCPS BIOP ENDOSC BSC -B: Performed by: INTERNAL MEDICINE

## 2023-01-16 RX ORDER — SODIUM CHLORIDE 0.9 % (FLUSH) 0.9 %
5-40 SYRINGE (ML) INJECTION EVERY 8 HOURS
Status: DISCONTINUED | OUTPATIENT
Start: 2023-01-16 | End: 2023-01-16 | Stop reason: HOSPADM

## 2023-01-16 RX ORDER — SODIUM CHLORIDE, SODIUM LACTATE, POTASSIUM CHLORIDE, CALCIUM CHLORIDE 600; 310; 30; 20 MG/100ML; MG/100ML; MG/100ML; MG/100ML
50 INJECTION, SOLUTION INTRAVENOUS CONTINUOUS
Status: DISCONTINUED | OUTPATIENT
Start: 2023-01-16 | End: 2023-01-16 | Stop reason: HOSPADM

## 2023-01-16 RX ORDER — FAMOTIDINE 20 MG/1
20 TABLET, FILM COATED ORAL ONCE
Status: COMPLETED | OUTPATIENT
Start: 2023-01-16 | End: 2023-01-16

## 2023-01-16 RX ORDER — LIDOCAINE HYDROCHLORIDE 10 MG/ML
0.1 INJECTION, SOLUTION EPIDURAL; INFILTRATION; INTRACAUDAL; PERINEURAL AS NEEDED
Status: DISCONTINUED | OUTPATIENT
Start: 2023-01-16 | End: 2023-01-16 | Stop reason: HOSPADM

## 2023-01-16 RX ORDER — SODIUM CHLORIDE 0.9 % (FLUSH) 0.9 %
5-40 SYRINGE (ML) INJECTION AS NEEDED
Status: DISCONTINUED | OUTPATIENT
Start: 2023-01-16 | End: 2023-01-16 | Stop reason: HOSPADM

## 2023-01-16 RX ORDER — PROPOFOL 10 MG/ML
INJECTION, EMULSION INTRAVENOUS AS NEEDED
Status: DISCONTINUED | OUTPATIENT
Start: 2023-01-16 | End: 2023-01-16 | Stop reason: HOSPADM

## 2023-01-16 RX ORDER — LIDOCAINE HYDROCHLORIDE 20 MG/ML
INJECTION, SOLUTION EPIDURAL; INFILTRATION; INTRACAUDAL; PERINEURAL AS NEEDED
Status: DISCONTINUED | OUTPATIENT
Start: 2023-01-16 | End: 2023-01-16 | Stop reason: HOSPADM

## 2023-01-16 RX ORDER — SODIUM CHLORIDE, SODIUM LACTATE, POTASSIUM CHLORIDE, CALCIUM CHLORIDE 600; 310; 30; 20 MG/100ML; MG/100ML; MG/100ML; MG/100ML
INJECTION, SOLUTION INTRAVENOUS
Status: DISCONTINUED | OUTPATIENT
Start: 2023-01-16 | End: 2023-01-16 | Stop reason: HOSPADM

## 2023-01-16 RX ADMIN — PROPOFOL 50 MG: 10 INJECTION, EMULSION INTRAVENOUS at 09:01

## 2023-01-16 RX ADMIN — PROPOFOL 100 MG: 10 INJECTION, EMULSION INTRAVENOUS at 09:00

## 2023-01-16 RX ADMIN — PROPOFOL 50 MG: 10 INJECTION, EMULSION INTRAVENOUS at 08:59

## 2023-01-16 RX ADMIN — SODIUM CHLORIDE, SODIUM LACTATE, POTASSIUM CHLORIDE, AND CALCIUM CHLORIDE: 600; 310; 30; 20 INJECTION, SOLUTION INTRAVENOUS at 08:50

## 2023-01-16 RX ADMIN — LIDOCAINE HYDROCHLORIDE 80 MG: 20 INJECTION, SOLUTION EPIDURAL; INFILTRATION; INTRACAUDAL; PERINEURAL at 08:50

## 2023-01-16 RX ADMIN — SODIUM CHLORIDE, POTASSIUM CHLORIDE, SODIUM LACTATE AND CALCIUM CHLORIDE 50 ML/HR: 600; 310; 30; 20 INJECTION, SOLUTION INTRAVENOUS at 08:02

## 2023-01-16 RX ADMIN — FAMOTIDINE 20 MG: 20 TABLET, FILM COATED ORAL at 07:57

## 2023-01-16 NOTE — DISCHARGE INSTRUCTIONS
Colonoscopy: What to Expect at [address] [place] [address]  After a colonoscopy, you'll stay at the clinic until you wake up. Then you can go home. But you'll need to arrange for a ride. Your doctor will tell you when you can eat and do your other usual activities. Your doctor will talk to you about when you'll need your next colonoscopy. Your doctor can help you decide how often you need to be checked. This will depend on the results of your test and your risk for colorectal cancer. After the test, you may be bloated or have gas pains. You may need to pass gas. If a biopsy was done or a polyp was removed, you may have streaks of blood in your stool (feces) for a few days. Problems such as heavy rectal bleeding may not occur until several weeks after the test. This isn't common. But it can happen after polyps are removed. This care sheet gives you a general idea about how long it will take for you to recover. But each person recovers at a different pace. Follow the steps below to get better as quickly as possible. How can you care for yourself at home? Activity    Rest when you feel tired. You can do your normal activities when it feels okay to do so. Diet    Follow your doctor's directions for eating. Unless your doctor has told you not to, drink plenty of fluids. This helps to replace the fluids that were lost during the colon prep. Do not drink alcohol. Medicines    Your doctor will tell you if and when you can restart your medicines. You will also be given instructions about taking any new medicines. If you take aspirin or some other blood thinner, ask your doctor if and when to start taking it again. Make sure that you understand exactly what your doctor wants you to do. If polyps were removed or a biopsy was done during the test, your doctor may tell you not to take aspirin or other anti-inflammatory medicines for a few days. These include ibuprofen (Advil, Motrin) and naproxen (Aleve). Other instructions    For your safety, do not drive or operate machinery until the medicine wears off and you can think clearly. Your doctor may tell you not to drive or operate machinery until the day after your test.     Do not sign legal documents or make major decisions until the medicine wears off and you can think clearly. The anesthesia can make it hard for you to fully understand what you are agreeing to. Follow-up care is a key part of your treatment and safety. Be sure to make and go to all appointments, and call your doctor if you are having problems. It's also a good idea to know your test results and keep a list of the medicines you take. When should you call for help? Call 911 anytime you think you may need emergency care. For example, call if:    You passed out (lost consciousness). You pass maroon or bloody stools. You have trouble breathing. Call your doctor now or seek immediate medical care if:    You have pain that does not get better after you take pain medicine. You are sick to your stomach or cannot drink fluids. You have new or worse belly pain. You have blood in your stools. You have a fever. You cannot pass stools or gas. Watch closely for changes in your health, and be sure to contact your doctor if you have any problems. Where can you learn more? Go to http://www.gray.com/  Enter E264 in the search box to learn more about \"Colonoscopy: What to Expect at Home. \"  Current as of: September 8, 2021               Content Version: 13.2  © 2006-2022 Healthwise, Incorporated. Care instructions adapted under license by [employer] (which disclaims liability or warranty for this information). If you have questions about a medical condition or this instruction, always ask your healthcare professional. [place] [address] any warranty or liability for your use of this information.     DISCHARGE SUMMARY from Nurse     POST-PROCEDURE INSTRUCTIONS:    Call your Physician if you:  Observe any excess bleeding. Develop a temperature over 100.5o F. Experience abdominal, shoulder or chest pain. Notice any signs of decreased circulation or nerve impairment to an extremity such as a change in color, persistent numbness, tingling, coldness or increase in pain. Vomit blood or you have nausea and vomiting lasting longer than 4 hours. Are unable to take medications. Are unable to urinate within 8 hours after discharge following general anesthesia or intravenous sedation. For the next 24 hours after receiving general anesthesia or intravenous sedation, or while taking prescription Narcotics, limit your activities:  Do NOT drive a motor vehicle, operate hazard machinery or power tools, or perform tasks that require coordination. The medication you received during your procedure may have some effect on your mental awareness. Do NOT make important personal or business decisions. The medication you received during your procedure may have some effect on your mental awareness. Do NOT drink alcoholic beverages. These drinks do not mix well with the medications that have been given to you. Upon discharge from the hospital, you must be accompanied by a responsible adult. Resume your diet as directed by your physician. Resume medications as your physician has prescribed. Please give a list of your current medications to your Primary Care Provider. Please update this list whenever your medications are discontinued, doses are changed, or new medications (including over-the-counter products) are added. Please carry medication information at all times in case of emergency situations. These are general instructions for a healthy lifestyle:    No smoking/ No tobacco products/ Avoid exposure to second hand smoke.   Surgeon General's Warning:  Quitting smoking now greatly reduces serious risk to your health. Obesity, smoking, and a sedentary lifestyle greatly increase your risk for illness. A healthy diet, regular physical exercise & weight monitoring are important for maintaining a healthy lifestyle  You may be retaining fluid if you have a history of heart failure or if you experience any of the following symptoms:  Weight gain of 3 pounds or more overnight or 5 pounds in a week, increased swelling in our hands or feet or shortness of breath while lying flat in bed. Please call your doctor as soon as you notice any of these symptoms; do not wait until your next office visit. Recognize signs and symptoms of STROKE:  F  -  Face looks uneven  A  -  Arms unable to move or move unevenly  S  -  Speech slurred or non-existent  T  -  Time to call 911 - as soon as signs and symptoms begin - DO NOT go back to bed or wait to see If you get better - TIME IS BRAIN. Colorectal Screening  Colorectal cancer almost always develops from precancerous polyps (abnormal growths) in the colon or rectum. Screening tests can find precancerous polyps, so that they can be removed before they turn into cancer. Screening tests can also find colorectal cancer early, when treatment works best.  Speak with your physician about when you should begin screening and how often you should be tested. [facility] Activation    Thank you for requesting access to [facility]. Please follow the instructions below to securely access and download your online medical record. [facility] allows you to send messages to your doctor, view your test results, renew your prescriptions, schedule appointments, and more. How Do I Sign Up? In your internet browser, go to [email or web address]. Click on the First Time User? Click Here link in the Sign In box. You will see the New Member Sign Up page. Enter your [facility] Access Code exactly as it appears below.  You will not need to use this code after youve completed the sign-up process. If you do not sign up before the expiration date, you must request a new code. [employer]eebel Access Code: Activation code not generated  Current Xeebel Status: Active (This is the date your [employer] access code will )    Enter the last four digits of your Social Security Number (xxxx) and Date of Birth (mm/dd/yyyy) as indicated and click Submit. You will be taken to the next sign-up page. Create a [employer] ID. This will be your Xeebel login ID and cannot be changed, so think of one that is secure and easy to remember. Create a Xeebel password. You can change your password at any time. Enter your Password Reset Question and Answer. This can be used at a later time if you forget your password. Enter your e-mail address. You will receive e-mail notification when new information is available in [address] Click Sign Up. You can now view and download portions of your medical record. Click the Klinq link to download a portable copy of your medical information. Additional Information    If you have questions, please call [phone]. Remember, Xeebel is NOT to be used for urgent needs. For medical emergencies, dial 911. Educational references and/or instructions provided during this visit included:    See Attached      APPOINTMENTS:    Per MD Instruction    Discharge information has been reviewed with the patient. The patient verbalized understanding.

## 2023-01-16 NOTE — ANESTHESIA POSTPROCEDURE EVALUATION
Procedure(s):  COLONOSCOPY w/ bxs.     MAC    Anesthesia Post Evaluation      Multimodal analgesia: multimodal analgesia used between 6 hours prior to anesthesia start to PACU discharge  Patient location during evaluation: PACU  Patient participation: complete - patient participated  Level of consciousness: sleepy but conscious  Pain management: adequate  Airway patency: patent  Anesthetic complications: no  Cardiovascular status: acceptable  Respiratory status: acceptable  Hydration status: acceptable  Post anesthesia nausea and vomiting:  controlled  Final Post Anesthesia Temperature Assessment:  Normothermia (36.0-37.5 degrees C)      INITIAL Post-op Vital signs:   Vitals Value Taken Time   /52 01/16/23 0922   Temp 36.6 °C (97.9 °F) 01/16/23 0922   Pulse 58 01/16/23 0922   Resp 12 01/16/23 0922   SpO2 97 % 01/16/23 0922

## 2023-01-16 NOTE — H&P
[facility] WWW. BeDo  [phone]    GASTROENTEROLOGY Pre-Procedure H and P      Impression/Plan:   1.  This patient is consented for a colonoscopy for diarrhea    Addendum: All lab tests orders pertaining to the procedure have been ordered by the anesthesia personnel and results will be addressed by the anesthesia team    Chief Complaint: diarrhea      HPI:  Jeremías Garrett is a 47 y.o. 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 ([facility] [address] 2007    Remission since 2008    Sleep apnea     Uses CPAP       PSH:   Past Surgical History:   Procedure Laterality Date    HX CERVICAL FUSION  2014    C5-6    HX LUMBAR FUSION  2009    L5-S1    HX LYMPH NODE DISSECTION Right 2007    Axillary mass removed , positive  [facility]    HX TONSILLECTOMY  1971    HX VASCULAR ACCESS  2007    Mediport inserted -removed 2009    IR BRONCH W LUNG BIOPSY SINGLE LOBE  2022    Developed Pneumothorax ,Inpt.6 days       Social HX:   Social History     Socioeconomic History    Marital status:      Spouse name: Not on file    Number of children: Not on file    Years of education: Not on file    Highest education level: Not on file   Occupational History    Not on file   Tobacco Use    Smoking status: Every Day     Packs/day: 0.50     Years: 35.00     Pack years: 17.50     Types: Cigarettes    Smokeless tobacco: Never   Vaping Use    Vaping Use: Never used   Substance and Sexual Activity    Alcohol use: Not Currently     Comment: Quit 2021    Drug use: Not Currently     Types: Marijuana    Sexual activity: Not on file   Other Topics Concern    Not on file   Social History Narrative    Not on file     Social Determinants of Health     Financial Resource Strain: Not on file   Food Insecurity: Not on file Transportation Needs: Not on file   Physical Activity: Not on file   Stress: Not on file   Social Connections: Not on file   Intimate Partner Violence: Not on file   Housing Stability: Not on file       FHX:   Family History   Problem Relation Age of Onset    Cancer Paternal Uncle     Diabetes Maternal Grandmother     Cancer Paternal Grandfather        Allergy:   No Known Allergies    Home Medications:     Medications Prior to Admission   Medication Sig    albuterol sulfate (Proair Digihaler) 90 mcg/actuation aebs Take 1 Puff by inhalation every four (4) hours as needed for Wheezing, Shortness of Breath or Cough. fluticasone-umeclidin-vilanter (Trelegy Ellipta) 200-62.5-25 mcg inhaler Take 1 Puff by inhalation daily. Rinse and gargle after each use    buPROPion XL (WELLBUTRIN XL) 150 mg tablet Take 150 mg by mouth daily. Indications: stop smoking    cyanocobalamin (VITAMIN B12) 500 mcg tablet Take 500 mcg by mouth daily. cholecalciferol, vitamin D3, 50 mcg (2,000 unit) tab Take 1 Tablet by mouth daily. ascorbic acid, vitamin C, (VITAMIN C) 500 mg tablet Take 1,000 mg by mouth daily. losartan (COZAAR) 50 mg tablet Take 50 mg by mouth daily. gabapentin (NEURONTIN) 300 mg capsule Take 300 mg by mouth two (2) times a day. Review of Systems:     Constitutional: No fevers, chills, weight loss, fatigue. Skin: No rashes, pruritis, jaundice, ulcerations, erythema. HENT: No headaches, nosebleeds, sinus pressure, rhinorrhea, sore throat. Eyes: No visual changes, blurred vision, eye pain, photophobia, jaundice. Cardiovascular: No chest pain, heart palpitations. Respiratory: No cough, SOB, wheezing, chest discomfort, orthopnea. Gastrointestinal: Neg unless noted otherwise in H&P   Genitourinary: No dysuria, bleeding, discharge, pyuria. Musculoskeletal: No weakness, arthralgias, wasting. Endo: No sweats. Heme: No bruising, easy bleeding. Allergies: As noted.    Neurological: Cranial nerves intact. Alert and oriented. Gait not assessed. Psychiatric:  No anxiety, depression, hallucinations. Visit Vitals  Ht 6' (1.829 m)   Wt 127 kg (280 lb)   BMI 37.97 kg/m²       Physical Assessment:     constitutional: appearance: well developed, well nourished, normal habitus, no deformities, in no acute distress. skin: inspection: no rashes, ulcers, icterus or other lesions; no clubbing or telangiectasias. palpation: no induration or subcutaneos nodules. eyes: inspection: normal conjunctivae and lids; no jaundice pupils: normal  ENMT: mouth: normal oral mucosa,lips and gums; good dentition. oropharynx: normal tongue, hard and soft palate; posterior pharynx without erithema, exudate or lesions. neck: thyroid: normal size, consistency and position; no masses or tenderness. respiratory: effort: normal chest excursion; no intercostal retraction or accessory muscle use. cardiovascular: abdominal aorta: normal size and position; no bruits. palpation: PMI of normal size and position; normal rhythm; no thrill or murmurs. abdominal: abdomen: normal consistency; no tenderness or masses. hernias: no hernias appreciated. liver: normal size and consistency. spleen: not palpable. rectal: hemoccult/guaiac: not performed. musculoskeletal: digits and nails: no clubbing, cyanosis, petechiae or other inflammatory conditions. gait: normal gait and station head and neck: normal range of motion; no pain, crepitation or contracture. spine/ribs/pelvis: normal range of motion; no pain, deformity or contracture. neurologic: cranial nerves: II-XII normal.   psychiatric: judgement/insight: within normal limits. memory: within normal limits for recent and remote events. mood and affect: no evidence of depression, anxiety or agitation. orientation: oriented to time, space and person. Basic Metabolic Profile   No results for input(s): NA, K, CL, CO2, BUN, GLU, CA, MG, PHOS in the last 72 hours.     No lab exists for component: CREAT      CBC w/Diff    No results for input(s): WBC, RBC, HGB, HCT, MCV, MCH, MCHC, RDW, PLT, HGBEXT, HCTEXT, PLTEXT in the last 72 hours. No lab exists for component: MPV No results for input(s): GRANS, LYMPH, EOS, PRO, MYELO, METAS, BLAST in the last 72 hours. No lab exists for component: MONO, BASO     Hepatic Function   No results for input(s): ALB, TP, TBILI, AP, AML, LPSE in the last 72 hours. No lab exists for component: DBILI, GPT, SGOT     Coags   No results for input(s): PTP, INR, APTT, INREXT in the last 72 hours. Sandrita Eldridge MD  [facility]  [facility], [address] [place] [address]  [email or web address]

## 2023-01-16 NOTE — ANESTHESIA PREPROCEDURE EVALUATION
Relevant Problems   No relevant active problems       Anesthetic History   No history of anesthetic complications            Review of Systems / Medical History  Patient summary reviewed, nursing notes reviewed and pertinent labs reviewed    Pulmonary    COPD: moderate    Sleep apnea: CPAP    Asthma : well controlled       Neuro/Psych   Within defined limits           Cardiovascular    Hypertension: well controlled                   GI/Hepatic/Renal  Within defined limits              Endo/Other        Morbid obesity     Other Findings              Physical Exam    Airway  Mallampati: III  TM Distance: 4 - 6 cm  Neck ROM: normal range of motion   Mouth opening: Normal     Cardiovascular  Regular rate and rhythm,  S1 and S2 normal,  no murmur, click, rub, or gallop  Rhythm: regular  Rate: normal         Dental  No notable dental hx       Pulmonary  Breath sounds clear to auscultation               Abdominal  GI exam deferred       Other Findings            Anesthetic Plan    ASA: 3  Anesthesia type: MAC          Induction: Intravenous  Anesthetic plan and risks discussed with: Patient

## 2023-04-22 ENCOUNTER — APPOINTMENT (OUTPATIENT)
Facility: HOSPITAL | Age: 53
End: 2023-04-22
Payer: COMMERCIAL

## 2023-04-22 ENCOUNTER — HOSPITAL ENCOUNTER (EMERGENCY)
Facility: HOSPITAL | Age: 53
Discharge: HOME | End: 2023-04-22
Attending: EMERGENCY MEDICINE
Payer: COMMERCIAL

## 2023-04-22 VITALS
WEIGHT: 265 LBS | RESPIRATION RATE: 20 BRPM | BODY MASS INDEX: 35.89 KG/M2 | HEART RATE: 84 BPM | TEMPERATURE: 98.2 F | SYSTOLIC BLOOD PRESSURE: 174 MMHG | OXYGEN SATURATION: 98 % | HEIGHT: 72 IN | DIASTOLIC BLOOD PRESSURE: 99 MMHG

## 2023-04-22 DIAGNOSIS — S46.309A INJURY OF TENDON OF TRICEPS: Primary | ICD-10-CM

## 2023-04-22 PROCEDURE — 73070 X-RAY EXAM OF ELBOW: CPT

## 2023-04-22 PROCEDURE — 6370000000 HC RX 637 (ALT 250 FOR IP): Performed by: EMERGENCY MEDICINE

## 2023-04-22 PROCEDURE — 99283 EMERGENCY DEPT VISIT LOW MDM: CPT

## 2023-04-22 RX ORDER — OXYCODONE HYDROCHLORIDE AND ACETAMINOPHEN 5; 325 MG/1; MG/1
2 TABLET ORAL
Status: COMPLETED | OUTPATIENT
Start: 2023-04-22 | End: 2023-04-22

## 2023-04-22 RX ORDER — OXYCODONE HYDROCHLORIDE AND ACETAMINOPHEN 5; 325 MG/1; MG/1
1 TABLET ORAL EVERY 6 HOURS PRN
Qty: 12 TABLET | Refills: 0 | Status: SHIPPED | OUTPATIENT
Start: 2023-04-22 | End: 2023-04-25

## 2023-04-22 RX ADMIN — OXYCODONE HYDROCHLORIDE AND ACETAMINOPHEN 2 TABLET: 5; 325 TABLET ORAL at 16:40

## 2023-04-22 ASSESSMENT — PAIN DESCRIPTION - PAIN TYPE: TYPE: ACUTE PAIN

## 2023-04-22 ASSESSMENT — PAIN - FUNCTIONAL ASSESSMENT
PAIN_FUNCTIONAL_ASSESSMENT: PREVENTS OR INTERFERES SOME ACTIVE ACTIVITIES AND ADLS
PAIN_FUNCTIONAL_ASSESSMENT: 0-10

## 2023-04-22 ASSESSMENT — PAIN DESCRIPTION - DIRECTION: RADIATING_TOWARDS: FINGERS

## 2023-04-22 ASSESSMENT — PAIN DESCRIPTION - ORIENTATION
ORIENTATION: LEFT
ORIENTATION: LEFT

## 2023-04-22 ASSESSMENT — PAIN SCALES - GENERAL
PAINLEVEL_OUTOF10: 8
PAINLEVEL_OUTOF10: 10

## 2023-04-22 ASSESSMENT — PAIN DESCRIPTION - DESCRIPTORS: DESCRIPTORS: SHOOTING

## 2023-04-22 ASSESSMENT — PAIN DESCRIPTION - FREQUENCY: FREQUENCY: CONTINUOUS

## 2023-04-22 ASSESSMENT — PAIN DESCRIPTION - ONSET: ONSET: SUDDEN

## 2023-04-22 ASSESSMENT — PAIN DESCRIPTION - LOCATION
LOCATION: ELBOW
LOCATION: ELBOW

## 2023-04-22 NOTE — ED PROVIDER NOTES
12 hrs:   Temp Pulse Resp BP SpO2   04/22/23 1507 98.2 °F (36.8 °C) 84 20 (!) 174/99 98 %       Vital Signs-Reviewed the patient's vital signs. Pulse Oximetry Analysis, Cardiac Monitor, 12 lead ekg:      Interpreted by the EP. Records Reviewed: Nursing notes reviewed (Time of Review: 5:46 PM)    Procedures:   Critical Care Time: 0  If critical care time is note it is exclusive of any separately billable procedures. Aspirin: (was aspirin given for stroke?)    Diagnosis     Clinical Impression:   1. Injury of tendon of triceps        Disposition: DISPOSITION Decision To Discharge 04/22/2023 05:43:27 PM       New for 2023:  DISCHARGE MEDICATIONS:  Current Discharge Medication List             Details   oxyCODONE-acetaminophen (PERCOCET) 5-325 MG per tablet Take 1 tablet by mouth every 6 hours as needed for Pain for up to 3 days. Intended supply: 3 days. Take lowest dose possible to manage pain Max Daily Amount: 4 tablets  Qty: 12 tablet, Refills: 0    Associated Diagnoses: Injury of tendon of triceps                Details   Albuterol Sulfate, sensor, (PROAIR DIGIHALER) 108 (90 Base) MCG/ACT AEPB Inhale 1 puff into the lungs every 4 hours as needed      ascorbic acid (VITAMIN C) 500 MG tablet Take 1,000 mg by mouth daily      buPROPion (WELLBUTRIN XL) 150 MG extended release tablet Take 150 mg by mouth daily      Cholecalciferol 50 MCG (2000 UT) TABS Take 1 tablet by mouth daily      cyanocobalamin 500 MCG tablet Take 500 mcg by mouth daily      fluticasone-umeclidin-vilant (TRELEGY ELLIPTA) 200-62.5-25 MCG/ACT AEPB inhaler Inhale 1 puff into the lungs daily      gabapentin (NEURONTIN) 300 MG capsule Take 300 mg by mouth daily.       losartan (COZAAR) 50 MG tablet Take 50 mg by mouth daily             DISCONTINUED MEDICATIONS:  Current Discharge Medication List          PATIENT REFERRED TO:  Follow Up with:  Isra Jameson MD  [address] [place] [address] [phone]    In 2

## 2023-04-22 NOTE — ED TRIAGE NOTES
Pt arrived via EMS for reported elbow pain. Per EMS the patient was walking down the steps when he heard a \"Pop\" of his left elbow. Pt states he has been having pain since.

## 2023-04-27 ENCOUNTER — HOSPITAL ENCOUNTER (OUTPATIENT)
Facility: HOSPITAL | Age: 53
Discharge: HOME | End: 2023-04-27
Payer: COMMERCIAL

## 2023-04-27 ENCOUNTER — HOSPITAL ENCOUNTER (OUTPATIENT)
Facility: HOSPITAL | Age: 53
End: 2023-04-27
Payer: COMMERCIAL

## 2023-04-27 ENCOUNTER — HOSPITAL ENCOUNTER (OUTPATIENT)
Facility: HOSPITAL | Age: 53
Discharge: HOME | End: 2023-04-30

## 2023-04-27 DIAGNOSIS — I10 HYPERTENSION, UNSPECIFIED TYPE: ICD-10-CM

## 2023-04-27 DIAGNOSIS — Z72.0 TOBACCO ABUSE: ICD-10-CM

## 2023-04-27 LAB — LABCORP SPECIMEN COLLECTION: NORMAL

## 2023-04-27 PROCEDURE — 71046 X-RAY EXAM CHEST 2 VIEWS: CPT

## 2023-04-27 PROCEDURE — 93005 ELECTROCARDIOGRAM TRACING: CPT | Performed by: ORTHOPAEDIC SURGERY

## 2023-04-27 PROCEDURE — 99001 SPECIMEN HANDLING PT-LAB: CPT

## 2023-04-28 LAB
EKG ATRIAL RATE: 55 BPM
EKG DIAGNOSIS: NORMAL
EKG P AXIS: 37 DEGREES
EKG P-R INTERVAL: 170 MS
EKG Q-T INTERVAL: 388 MS
EKG QRS DURATION: 86 MS
EKG QTC CALCULATION (BAZETT): 371 MS
EKG R AXIS: 47 DEGREES
EKG T AXIS: 53 DEGREES
EKG VENTRICULAR RATE: 55 BPM

## 2023-04-28 PROCEDURE — 93010 ELECTROCARDIOGRAM REPORT: CPT | Performed by: INTERNAL MEDICINE

## 2023-09-21 PROBLEM — M25.50 ARTHRALGIA OF MULTIPLE SITES: Status: ACTIVE | Noted: 2023-09-21

## 2023-09-21 PROBLEM — F17.200 CURRENT SMOKER: Status: ACTIVE | Noted: 2023-09-21

## 2023-09-21 PROBLEM — R91.8 MULTIPLE PULMONARY NODULES: Status: ACTIVE | Noted: 2023-09-21

## 2023-09-21 PROBLEM — E53.8 VITAMIN B 12 DEFICIENCY: Status: ACTIVE | Noted: 2023-09-21

## 2023-09-21 PROBLEM — E55.9 HYPOVITAMINOSIS D: Status: ACTIVE | Noted: 2023-09-21

## 2023-09-21 RX ORDER — HYDROCODONE BITARTRATE AND ACETAMINOPHEN 5; 300 MG/1; MG/1
.5-1 TABLET ORAL EVERY 4 HOURS PRN
COMMUNITY
Start: 2015-03-27 | End: 2023-09-25

## 2023-09-21 RX ORDER — NAPROXEN 250 MG/1
250 TABLET ORAL 2 TIMES DAILY
COMMUNITY
Start: 2015-03-27 | End: 2023-09-25

## 2023-09-21 RX ORDER — HYDROXYCHLOROQUINE SULFATE 200 MG/1
200 TABLET, FILM COATED ORAL 2 TIMES DAILY
COMMUNITY
Start: 2023-04-27

## 2023-09-25 ENCOUNTER — OFFICE VISIT (OUTPATIENT)
Age: 53
End: 2023-09-25
Payer: COMMERCIAL

## 2023-09-25 VITALS
TEMPERATURE: 98.4 F | HEIGHT: 72 IN | RESPIRATION RATE: 16 BRPM | OXYGEN SATURATION: 95 % | DIASTOLIC BLOOD PRESSURE: 75 MMHG | SYSTOLIC BLOOD PRESSURE: 148 MMHG | HEART RATE: 60 BPM | WEIGHT: 292.2 LBS | BODY MASS INDEX: 39.58 KG/M2

## 2023-09-25 DIAGNOSIS — Z85.72 HISTORY OF LYMPHOMA: ICD-10-CM

## 2023-09-25 DIAGNOSIS — R06.02 SHORTNESS OF BREATH: ICD-10-CM

## 2023-09-25 DIAGNOSIS — R91.8 LUNG NODULES: ICD-10-CM

## 2023-09-25 DIAGNOSIS — Z87.891 PERSONAL HISTORY OF TOBACCO USE, PRESENTING HAZARDS TO HEALTH: ICD-10-CM

## 2023-09-25 DIAGNOSIS — J44.9 ASTHMA-COPD OVERLAP SYNDROME: Primary | ICD-10-CM

## 2023-09-25 PROCEDURE — 3078F DIAST BP <80 MM HG: CPT | Performed by: INTERNAL MEDICINE

## 2023-09-25 PROCEDURE — 99214 OFFICE O/P EST MOD 30 MIN: CPT | Performed by: INTERNAL MEDICINE

## 2023-09-25 PROCEDURE — 3074F SYST BP LT 130 MM HG: CPT | Performed by: INTERNAL MEDICINE

## 2023-09-25 RX ORDER — ALBUTEROL SULFATE 90 UG/1
1-2 AEROSOL, METERED RESPIRATORY (INHALATION) EVERY 4 HOURS PRN
Qty: 1 EACH | Refills: 5 | Status: SHIPPED | OUTPATIENT
Start: 2023-09-25

## 2023-09-25 RX ORDER — FLUTICASONE FUROATE, UMECLIDINIUM BROMIDE AND VILANTEROL TRIFENATATE 200; 62.5; 25 UG/1; UG/1; UG/1
1 POWDER RESPIRATORY (INHALATION) DAILY
Qty: 3 EACH | Refills: 3 | Status: SHIPPED | OUTPATIENT
Start: 2023-09-25

## 2023-09-25 RX ORDER — IBUPROFEN 600 MG/1
600 TABLET ORAL EVERY 6 HOURS PRN
COMMUNITY
Start: 2023-09-21

## 2023-09-25 RX ORDER — PREDNISONE 20 MG/1
40 TABLET ORAL DAILY
Qty: 10 TABLET | Refills: 0 | Status: SHIPPED | OUTPATIENT
Start: 2023-09-25 | End: 2023-09-30

## 2023-09-25 ASSESSMENT — PATIENT HEALTH QUESTIONNAIRE - PHQ9
SUM OF ALL RESPONSES TO PHQ QUESTIONS 1-9: 0
SUM OF ALL RESPONSES TO PHQ9 QUESTIONS 1 & 2: 0
1. LITTLE INTEREST OR PLEASURE IN DOING THINGS: 0
SUM OF ALL RESPONSES TO PHQ QUESTIONS 1-9: 0
2. FEELING DOWN, DEPRESSED OR HOPELESS: 0

## 2023-09-25 ASSESSMENT — SLEEP AND FATIGUE QUESTIONNAIRES
HOW LIKELY ARE YOU TO NOD OFF OR FALL ASLEEP WHILE LYING DOWN TO REST IN THE AFTERNOON WHEN CIRCUMSTANCES PERMIT: 3
HOW LIKELY ARE YOU TO NOD OFF OR FALL ASLEEP IN A CAR, WHILE STOPPED FOR A FEW MINUTES IN TRAFFIC: 0
HOW LIKELY ARE YOU TO NOD OFF OR FALL ASLEEP WHILE SITTING QUIETLY AFTER LUNCH WITHOUT ALCOHOL: 3
HOW LIKELY ARE YOU TO NOD OFF OR FALL ASLEEP WHEN YOU ARE A PASSENGER IN A CAR FOR AN HOUR WITHOUT A BREAK: 0
ESS TOTAL SCORE: 14
HOW LIKELY ARE YOU TO NOD OFF OR FALL ASLEEP WHILE SITTING AND READING: 3
HOW LIKELY ARE YOU TO NOD OFF OR FALL ASLEEP WHILE WATCHING TV: 2
HOW LIKELY ARE YOU TO NOD OFF OR FALL ASLEEP WHILE SITTING INACTIVE IN A PUBLIC PLACE: 2
HOW LIKELY ARE YOU TO NOD OFF OR FALL ASLEEP WHILE SITTING AND TALKING TO SOMEONE: 1

## 2023-09-25 NOTE — PROGRESS NOTES
Sergio Pulido presents today for   Chief Complaint   Patient presents with    Abnormal CT     Follow up from 6/15/2022    COPD    Nicotine Dependence     History of     Results     CT chest  & 2022 & 3/7/2022 (Julius), CXR 2023    CPAP/BiPAP       Is someone accompanying this pt? Yes. Spouse    Is the patient using any DME equipment during OV? Yes. CPAP machine (follows Dr. Bethanie Ybarra for sleep)    -[employer]    Depression Screenin/25/2023     2:34 PM   PHQ-9 Questionaire   Little interest or pleasure in doing things 0   Feeling down, depressed, or hopeless 0   PHQ-9 Total Score 0       Learning Needs Questionnaire:     Who is the primary learner? Patient    What is the preferred language for health care of the primary learner? ENGLISH    How does the primary learner prefer to learn new concepts? DEMONSTRATION    How does the primary learner prefer to learn new concepts? VIDEOS    How does the primary learner prefer to learn new concepts? PICTURES    How does the primary learner prefer to learn new concepts? READING    How does the primary learner prefer to learn new concepts? LISTENING    Answered By Patient    Relationship to Learner SELF          Fall Risk:          No data to display                 Abuse Screening:          No data to display                  Coordination of Care:    1. Have you been to the ER, urgent care clinic since your last visit? Hospitalized since your last visit? Yes. [facility]-2023-colonoscopy with biopsy & [facility] ED-2023-injury of tendon of tricep    2. Have you seen or consulted any other health care providers outside of the [address] [place] [address] Avenue since your last visit? Include any pap smears or colon screening. Yes. Dr. Renetta Ackerman, PCP, Dr. King Villanueva, oncologist, Dr. Barbara Meza, rheumatologist    Medication list has been update per patient. Per patient, did not receive influenza vaccine last season.

## 2023-09-25 NOTE — PROGRESS NOTES
[address] [place] [address], [address] [place] [address] [facility]   Pulmonary, Critical Care, and Sleep Medicine      Pulmonary Office F/U  Name: Dolly Downing 46 y.o. male   MRN: [id number]  : 1970  Service Date: 23    Chief Complaint:    Chief Complaint   Patient presents with    Abnormal CT     Follow up from 6/15/2022    COPD    Nicotine Dependence     History of     Results     CT chest  & 2022 & 3/7/2022 Linda Pires), CXR 2023    CPAP/BiPAP        History of Present Illness: (Patient accompanied by wife)  Dolly Downing is a 46 y.o. male, who presents to Pulmonary clinic for follow-up of COPD. Pt last seen in our clinic on 06/15/22  Pt reports that he tore his triceps and meniscus in April, had Surgery in May for arm. Pt reports hes in PT for his knee.   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 ([address]) 2007    Remission since     Pilonidal cyst with abscess     Pneumothorax, unspecified     S/P LUNG BIOPSY    Sleep apnea     Uses CPAP     Past Surgical History:   Procedure Laterality Date    CERVICAL FUSION      C5-6    COLONOSCOPY N/A 2023    COLONOSCOPY w/ bxs performed by Mag Rodriguez MD at [facility]  2022    CT INSERT CATH PLEURA W IMAGE 2022 [facility] RAD CT    CT NEEDLE BIOPSY LUNG PERCUTANEOUS  2022    CT NEEDLE BIOPSY LUNG PERCUTANEOUS 2022 [facility] RAD CT    IR BRONCHOSCOPY W/BX W/ OR W/O FLUOR      Developed Pneumothorax ,Inpt.6 days    LUMBAR FUSION      L5-S1    LYMPH NODE DISSECTION

## 2023-11-17 ENCOUNTER — HOSPITAL ENCOUNTER (OUTPATIENT)
Facility: HOSPITAL | Age: 53
Setting detail: RECURRING SERIES
Discharge: HOME | End: 2023-11-20
Payer: COMMERCIAL

## 2023-11-17 PROCEDURE — 99214 OFFICE O/P EST MOD 30 MIN: CPT

## 2023-11-17 PROCEDURE — 99205 OFFICE O/P NEW HI 60 MIN: CPT | Performed by: RADIOLOGY

## 2023-11-21 ENCOUNTER — HOSPITAL ENCOUNTER (OUTPATIENT)
Facility: HOSPITAL | Age: 53
Discharge: HOME | End: 2023-11-24
Attending: STUDENT IN AN ORGANIZED HEALTH CARE EDUCATION/TRAINING PROGRAM
Payer: COMMERCIAL

## 2023-11-21 DIAGNOSIS — C61 PROSTATE CANCER (HCC): ICD-10-CM

## 2023-11-21 PROCEDURE — 6360000004 HC RX CONTRAST MEDICATION: Performed by: STUDENT IN AN ORGANIZED HEALTH CARE EDUCATION/TRAINING PROGRAM

## 2023-11-21 PROCEDURE — 3430000000 HC RX DIAGNOSTIC RADIOPHARMACEUTICAL: Performed by: STUDENT IN AN ORGANIZED HEALTH CARE EDUCATION/TRAINING PROGRAM

## 2023-11-21 PROCEDURE — A9503 TC99M MEDRONATE: HCPCS | Performed by: STUDENT IN AN ORGANIZED HEALTH CARE EDUCATION/TRAINING PROGRAM

## 2023-11-21 PROCEDURE — 78306 BONE IMAGING WHOLE BODY: CPT | Performed by: STUDENT IN AN ORGANIZED HEALTH CARE EDUCATION/TRAINING PROGRAM

## 2023-11-21 PROCEDURE — 74177 CT ABD & PELVIS W/CONTRAST: CPT

## 2023-11-21 RX ORDER — TC 99M MEDRONATE 20 MG/10ML
25 INJECTION, POWDER, LYOPHILIZED, FOR SOLUTION INTRAVENOUS
Status: COMPLETED | OUTPATIENT
Start: 2023-11-21 | End: 2023-11-21

## 2023-11-21 RX ADMIN — TC 99M MEDRONATE 25 MILLICURIE: 20 INJECTION, POWDER, LYOPHILIZED, FOR SOLUTION INTRAVENOUS at 10:20

## 2023-11-21 RX ADMIN — IOPAMIDOL 100 ML: 612 INJECTION, SOLUTION INTRAVENOUS at 10:07

## 2023-11-30 ENCOUNTER — HOSPITAL ENCOUNTER (OUTPATIENT)
Facility: HOSPITAL | Age: 53
Discharge: HOME | End: 2023-11-30
Attending: RADIOLOGY
Payer: COMMERCIAL

## 2023-11-30 DIAGNOSIS — C61 MALIGNANT NEOPLASM OF PROSTATE (HCC): ICD-10-CM

## 2023-11-30 DIAGNOSIS — C81.94 HODGKIN LYMPHOMA, UNSPECIFIED, LYMPH NODES OF AXILLA AND UPPER LIMB (HCC): ICD-10-CM

## 2023-11-30 PROCEDURE — A9552 F18 FDG: HCPCS | Performed by: RADIOLOGY

## 2023-11-30 PROCEDURE — 2500000003 HC RX 250 WO HCPCS: Performed by: RADIOLOGY

## 2023-11-30 PROCEDURE — 78815 PET IMAGE W/CT SKULL-THIGH: CPT

## 2023-11-30 PROCEDURE — 3430000000 HC RX DIAGNOSTIC RADIOPHARMACEUTICAL: Performed by: RADIOLOGY

## 2023-11-30 RX ORDER — FLUDEOXYGLUCOSE F-18 500 MCI/ML
12.9 INJECTION INTRAVENOUS
Status: COMPLETED | OUTPATIENT
Start: 2023-11-30 | End: 2023-11-30

## 2023-11-30 RX ADMIN — FLUDEOXYGLUCOSE F-18 12.9 MILLICURIE: 500 INJECTION INTRAVENOUS at 15:28

## 2023-11-30 RX ADMIN — BARIUM SULFATE 450 ML: 21 SUSPENSION ORAL at 15:28

## 2023-12-04 ENCOUNTER — OFFICE VISIT (OUTPATIENT)
Age: 53
End: 2023-12-04
Payer: COMMERCIAL

## 2023-12-04 VITALS
DIASTOLIC BLOOD PRESSURE: 86 MMHG | BODY MASS INDEX: 40.5 KG/M2 | WEIGHT: 299 LBS | RESPIRATION RATE: 18 BRPM | SYSTOLIC BLOOD PRESSURE: 138 MMHG | OXYGEN SATURATION: 95 % | HEART RATE: 72 BPM | HEIGHT: 72 IN | TEMPERATURE: 98.6 F

## 2023-12-04 DIAGNOSIS — J44.9 ASTHMA-COPD OVERLAP SYNDROME: Primary | ICD-10-CM

## 2023-12-04 DIAGNOSIS — E66.01 MORBID OBESITY (HCC): ICD-10-CM

## 2023-12-04 DIAGNOSIS — Z87.891 PERSONAL HISTORY OF TOBACCO USE, PRESENTING HAZARDS TO HEALTH: ICD-10-CM

## 2023-12-04 PROBLEM — F17.200 TOBACCO DEPENDENCE SYNDROME: Status: RESOLVED | Noted: 2017-04-20 | Resolved: 2023-12-04

## 2023-12-04 PROBLEM — G47.33 OBSTRUCTIVE SLEEP APNEA SYNDROME: Status: ACTIVE | Noted: 2023-12-04

## 2023-12-04 PROBLEM — E66.9 CLASS 2 OBESITY: Status: ACTIVE | Noted: 2023-12-04

## 2023-12-04 PROBLEM — M47.812 CERVICAL SPONDYLOSIS WITHOUT MYELOPATHY: Status: ACTIVE | Noted: 2023-12-04

## 2023-12-04 PROBLEM — R40.0 DAYTIME SOMNOLENCE: Status: ACTIVE | Noted: 2023-12-04

## 2023-12-04 PROBLEM — F17.200 CURRENT SMOKER: Status: RESOLVED | Noted: 2023-09-21 | Resolved: 2023-12-04

## 2023-12-04 PROBLEM — E66.812 CLASS 2 OBESITY: Status: ACTIVE | Noted: 2023-12-04

## 2023-12-04 PROBLEM — R51.9 MORNING HEADACHE: Status: ACTIVE | Noted: 2023-12-04

## 2023-12-04 PROBLEM — R06.83 SNORING: Status: ACTIVE | Noted: 2023-12-04

## 2023-12-04 PROCEDURE — 3079F DIAST BP 80-89 MM HG: CPT | Performed by: INTERNAL MEDICINE

## 2023-12-04 PROCEDURE — 3075F SYST BP GE 130 - 139MM HG: CPT | Performed by: INTERNAL MEDICINE

## 2023-12-04 PROCEDURE — 99214 OFFICE O/P EST MOD 30 MIN: CPT | Performed by: INTERNAL MEDICINE

## 2023-12-04 RX ORDER — FLUTICASONE FUROATE, UMECLIDINIUM BROMIDE AND VILANTEROL TRIFENATATE 200; 62.5; 25 UG/1; UG/1; UG/1
1 POWDER RESPIRATORY (INHALATION) DAILY
Qty: 3 EACH | Refills: 3 | Status: SHIPPED | OUTPATIENT
Start: 2023-12-04

## 2023-12-04 RX ORDER — DULOXETIN HYDROCHLORIDE 30 MG/1
30 CAPSULE, DELAYED RELEASE ORAL DAILY
COMMUNITY

## 2023-12-04 NOTE — PROGRESS NOTES
Ruben Reason presents today for   Chief Complaint   Patient presents with    Shortness of Breath     Up form 2023    Asthma     -COPD overlap syndrome    Pulmonary Nodule    Other     History of tobacco use, history of lymphoma    Results     CT chest, abdomen & pelvis 2023, PET/CT 2023       Is someone accompanying this pt? Yes. Family member    Is the patient using any DME equipment during [address]? Yes. CPAP machine (followed by Dr. Marbin Murillo for sleep)    -[employer]    Depression Screenin/25/2023     2:34 PM   PHQ-9 Questionaire   Little interest or pleasure in doing things 0   Feeling down, depressed, or hopeless 0   PHQ-9 Total Score 0       Learning Needs Questionnaire:     Who is the primary learner? Patient    What is the preferred language for health care of the primary learner? ENGLISH    How does the primary learner prefer to learn new concepts? DEMONSTRATION    How does the primary learner prefer to learn new concepts? VIDEOS    How does the primary learner prefer to learn new concepts? PICTURES    How does the primary learner prefer to learn new concepts? LISTENING    How does the primary learner prefer to learn new concepts? READING    Answered By Patient    Relationship to Learner SELF          Fall Risk:          No data to display                 Abuse Screening:          No data to display                  Coordination of Care:    1. Have you been to the ER, urgent care clinic since your last visit? Hospitalized since your last visit? No    2. Have you seen or consulted any other health care providers outside of the [address] [place] [address] since your last visit? Include any pap smears or colon screening. Yes. Dr. Levonne Severe, PCP, Dr. Fredi Rich, rheumatologist, dR. Vinita hedrick, oncologist, Dr. Radha Lau, ortho    Medication list has been update per patient.

## 2023-12-04 NOTE — PROGRESS NOTES
[facility], [address] [place] [address] Pulmonary Specialists   Pulmonary, Critical Care, and Sleep Medicine      Pulmonary Office F/U  Name: Brendon Bah 46 y.o. male   MRN: [id number]  : 1970  Service Date: 23    Chief Complaint:    Chief Complaint   Patient presents with    Shortness of Breath     Up form 2023    Asthma     -COPD overlap syndrome    Pulmonary Nodule    Other     History of tobacco use, history of lymphoma    Results     CT chest, abdomen & pelvis 2023, PET/CT 2023        History of Present Illness: (Patient accompanied by wife)  Brendon Bah is a 46 y.o. male, who presents to Pulmonary clinic for follow-up of COPD. Pt last seen in our clinic on 23. In the interval, pt was diagnosed with prostate cancer recently -- found by elevated PSA about 6 months ago. Pt saw Dr. Cassidy Albarado for radiation oncology. Pt had a repeat PET for lymphoma surveillance which did not show any recurrence. With regards to dyspnea, they report no new issues. Pt doing much better.   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

## 2024-03-18 PROBLEM — C61 CARCINOMA OF PROSTATE (HCC): Status: ACTIVE | Noted: 2024-03-18

## 2024-05-28 ENCOUNTER — OFFICE VISIT (OUTPATIENT)
Age: 54
End: 2024-05-28
Payer: COMMERCIAL

## 2024-05-28 VITALS
DIASTOLIC BLOOD PRESSURE: 84 MMHG | BODY MASS INDEX: 34.35 KG/M2 | SYSTOLIC BLOOD PRESSURE: 151 MMHG | OXYGEN SATURATION: 97 % | HEART RATE: 61 BPM | WEIGHT: 253.6 LBS | TEMPERATURE: 97.4 F | HEIGHT: 72 IN | RESPIRATION RATE: 18 BRPM

## 2024-05-28 DIAGNOSIS — J44.89 ASTHMA-COPD OVERLAP SYNDROME (HCC): Primary | ICD-10-CM

## 2024-05-28 DIAGNOSIS — E66.01 MORBID OBESITY (HCC): ICD-10-CM

## 2024-05-28 DIAGNOSIS — Z85.72 HISTORY OF LYMPHOMA: ICD-10-CM

## 2024-05-28 DIAGNOSIS — Z87.891 PERSONAL HISTORY OF TOBACCO USE, PRESENTING HAZARDS TO HEALTH: ICD-10-CM

## 2024-05-28 DIAGNOSIS — R91.8 LUNG NODULES: ICD-10-CM

## 2024-05-28 DIAGNOSIS — R06.02 SHORTNESS OF BREATH: ICD-10-CM

## 2024-05-28 PROCEDURE — 3077F SYST BP >= 140 MM HG: CPT | Performed by: INTERNAL MEDICINE

## 2024-05-28 PROCEDURE — 3079F DIAST BP 80-89 MM HG: CPT | Performed by: INTERNAL MEDICINE

## 2024-05-28 PROCEDURE — 99214 OFFICE O/P EST MOD 30 MIN: CPT | Performed by: INTERNAL MEDICINE

## 2024-05-28 NOTE — PROGRESS NOTES
Jr Ivan presents today for   Chief Complaint   Patient presents with    COPD    Asthma       Is someone accompanying this pt? wIFE    Is the patient using any DME equipment during OV? Cpap Dr Gallegos for sleep     -DME [employer] NA    Depression Screenin/25/2023     2:34 PM   PHQ-9 Questionaire   Little interest or pleasure in doing things 0   Feeling down, depressed, or hopeless 0   PHQ-9 Total Score 0       Learning Assessment:    Failed to redirect to the Timeline version of the [employer] SmartLink.    Abuse Screening:         No data to display                Fall Risk    Failed to redirect to the Timeline version of the [employer] SmartLink.    Coordination of Care:    1. Have you been to the ER, urgent care clinic since your last visit? Hospitalized since your last visit? No    2. Have you seen or consulted any other health care providers outside of the [facility] since your last visit? Include any pap smears or colon screening. No    Medication list has been update per patient.    
03/08/2024    BILITOT Negative 03/08/2024    ALKPHOS 66 05/27/2022    AST 16 05/27/2022    ALT 31 05/27/2022    LABGLOM >60.0 03/08/2024    GFRAA >60 05/28/2022    AGRATIO 1.0 05/27/2022    GLOB 3.8 05/27/2022     Pathology report: CT-guided lung biopsy from 5/26/2022 shows noncaseating granulomatous inflammation.    Imaging:  I have personally reviewed patient's imaging as follows: PET/CT scan from 11/30/2023 shows complete resolution of lung masses and nodules, no hypermetabolic activity in lungs or mediastinal/hilar lymph nodes.  This was compared to PET/CT from 5/27/2022 which showed residual lung masses.  Official report per radiology:  PET Results (most recent):  PET CT TUMOR IMAGE SKULL THIGH PSMA 12/13/2023    Narrative  ORDERING PHYSICIAN: Dr. MICHAEL HIDALGO    INDICATION: 83-year-old male with prostate cancer. The patient also has a  history of non-Hodgkin's, status post chemoradiation. The patient was referred  for PET-CT study for initial staging of the tumor.    TECHNIQUE:  5.5 mCi of intravenous Illuccix was administered. Subsequently,  emission scans of the body from the thighs to the eyes were obtaining beginning  approximately 60 minutes after injection of the radiotracer. A low dose spiral  CT scan was integrated with the PET data for attenuation correction and anatomic  localization. The data was reconstructed into 3D cine(MIP), transverse, coronal  and sagittal images. The standardized uptake values (SUV) were calculated using  the patient's body weight.    Comparison is made to  an 11/30/2023 FDG PET/CT and an 8/30/2013 PET/CT.    FINDINGS:    PROSTATE BED:  There is focal uptake within the inferior aspect of the prostate gland  bilaterally compatible with the patient's known prostate neoplasm.    LYMPH NODES:  There is mild increased uptake corresponding to bilateral external iliac  chain/pelvic sidewall lymph nodes measuring up to 1.4 cm in short axis on the  right and 1.3 cm in short axis on

## 2024-11-21 ENCOUNTER — APPOINTMENT (OUTPATIENT)
Facility: HOSPITAL | Age: 54
End: 2024-11-21
Attending: UROLOGY
Payer: COMMERCIAL

## 2024-11-21 ENCOUNTER — HOSPITAL ENCOUNTER (OUTPATIENT)
Facility: HOSPITAL | Age: 54
End: 2024-11-21
Attending: UROLOGY
Payer: COMMERCIAL

## 2024-11-21 ENCOUNTER — HOSPITAL ENCOUNTER (OUTPATIENT)
Facility: HOSPITAL | Age: 54
Discharge: HOME | End: 2024-11-21
Attending: INTERNAL MEDICINE
Payer: COMMERCIAL

## 2024-11-21 DIAGNOSIS — Z87.891 PERSONAL HISTORY OF TOBACCO USE, PRESENTING HAZARDS TO HEALTH: ICD-10-CM

## 2024-11-21 DIAGNOSIS — J44.89 ASTHMA-COPD OVERLAP SYNDROME (HCC): ICD-10-CM

## 2024-11-21 PROCEDURE — 71271 CT THORAX LUNG CANCER SCR C-: CPT

## 2025-02-24 DIAGNOSIS — J44.89 ASTHMA-COPD OVERLAP SYNDROME (HCC): ICD-10-CM

## 2025-02-24 NOTE — TELEPHONE ENCOUNTER
Refill request from the pharmacy former ela delgado patient last seen 5/24 message sent to the front to contact patient and schd f/u

## 2025-02-27 RX ORDER — FLUTICASONE FUROATE, UMECLIDINIUM BROMIDE AND VILANTEROL TRIFENATATE 200; 62.5; 25 UG/1; UG/1; UG/1
1 POWDER RESPIRATORY (INHALATION) DAILY
Qty: 3 EACH | Refills: 0 | Status: SHIPPED | OUTPATIENT
Start: 2025-02-27

## 2025-06-18 ENCOUNTER — SCHEDULED TELEPHONE ENCOUNTER (OUTPATIENT)
Age: 55
End: 2025-06-18

## 2025-06-18 DIAGNOSIS — J44.89 ASTHMA-COPD OVERLAP SYNDROME (HCC): ICD-10-CM

## 2025-06-18 RX ORDER — ALBUTEROL SULFATE 90 UG/1
1-2 INHALANT RESPIRATORY (INHALATION) EVERY 4 HOURS PRN
Qty: 1 EACH | Refills: 5 | Status: SHIPPED | OUTPATIENT
Start: 2025-06-18

## 2025-06-18 RX ORDER — FLUTICASONE FUROATE, UMECLIDINIUM BROMIDE AND VILANTEROL TRIFENATATE 200; 62.5; 25 UG/1; UG/1; UG/1
1 POWDER RESPIRATORY (INHALATION) DAILY
Qty: 3 EACH | Refills: 0 | Status: SHIPPED | OUTPATIENT
Start: 2025-06-18

## (undated) DEVICE — SINGLE-USE BIOPSY FORCEPS: Brand: RADIAL JAW 4

## (undated) DEVICE — SOLUTION IRRIG 1000ML H2O STRL BLT

## (undated) DEVICE — GOWN PLASTIC FILM THMBHKS UNIV BLUE: Brand: CARDINAL HEALTH

## (undated) DEVICE — GAUZE,SPONGE,4"X4",16PLY,STRL,LF,10/TRAY: Brand: MEDLINE

## (undated) DEVICE — CANNULA ORIG TL CLR W FOAM CUSHIONS AND 14FT SUPL TB 3 CHN

## (undated) DEVICE — CANNULA NSL AD TBNG L14FT STD PVC O2 CRV CONN NONFLARED NSL

## (undated) DEVICE — SYRINGE MED 25GA 3ML L5/8IN SUBQ PLAS W/ DETACH NDL SFTY

## (undated) DEVICE — SYRINGE 20ML LL S/C 50

## (undated) DEVICE — CATHETER SUCT TR FL TIP 14FR W/ O CTRL

## (undated) DEVICE — LINER SUCT CANSTR 3000CC PLAS SFT PRE ASSEMB W/OUT TBNG W/

## (undated) DEVICE — SYRINGE MED 50ML LUERSLIP TIP

## (undated) DEVICE — SNARE POLYP M W27MMXL240CM OVL STIFF DISP CAPTIVATOR

## (undated) DEVICE — YANKAUER,SMOOTH HANDLE,HIGH CAPACITY: Brand: MEDLINE INDUSTRIES, INC.

## (undated) DEVICE — FCPS RAD JAW 4LC 240CM W/NDL -- BX/40

## (undated) DEVICE — SYRINGE MED 10ML LUERLOCK TIP W/O SFTY DISP

## (undated) DEVICE — MEDI-VAC NON-CONDUCTIVE SUCTION TUBING: Brand: CARDINAL HEALTH

## (undated) DEVICE — ENDOSCOPY PUMP TUBING/ CAP SET: Brand: ERBE

## (undated) DEVICE — FLUFF AND POLYMER UNDERPAD,EXTRA HEAVY: Brand: WINGS